# Patient Record
Sex: MALE | Race: WHITE | NOT HISPANIC OR LATINO | Employment: OTHER | URBAN - METROPOLITAN AREA
[De-identification: names, ages, dates, MRNs, and addresses within clinical notes are randomized per-mention and may not be internally consistent; named-entity substitution may affect disease eponyms.]

---

## 2017-03-10 ENCOUNTER — TELEPHONE (OUTPATIENT)
Dept: CARDIOLOGY | Facility: MEDICAL CENTER | Age: 82
End: 2017-03-10

## 2017-03-10 DIAGNOSIS — I10 ESSENTIAL HYPERTENSION: ICD-10-CM

## 2017-03-10 DIAGNOSIS — I48.91 ATRIAL FIBRILLATION, UNSPECIFIED TYPE (HCC): ICD-10-CM

## 2017-03-10 RX ORDER — METOPROLOL SUCCINATE 25 MG/1
25 TABLET, EXTENDED RELEASE ORAL DAILY
Qty: 30 TAB | Refills: 2 | Status: SHIPPED | OUTPATIENT
Start: 2017-03-10 | End: 2017-06-19 | Stop reason: SDUPTHER

## 2017-03-10 RX ORDER — METOPROLOL SUCCINATE 25 MG/1
25 TABLET, EXTENDED RELEASE ORAL DAILY
Qty: 30 TAB | Refills: 6 | OUTPATIENT
Start: 2017-03-10

## 2017-04-28 ENCOUNTER — NON-PROVIDER VISIT (OUTPATIENT)
Dept: CARDIOLOGY | Facility: MEDICAL CENTER | Age: 82
End: 2017-04-28
Payer: COMMERCIAL

## 2017-04-28 ENCOUNTER — OFFICE VISIT (OUTPATIENT)
Dept: CARDIOLOGY | Facility: MEDICAL CENTER | Age: 82
End: 2017-04-28
Payer: COMMERCIAL

## 2017-04-28 VITALS
HEIGHT: 72 IN | OXYGEN SATURATION: 94 % | WEIGHT: 164 LBS | HEART RATE: 63 BPM | SYSTOLIC BLOOD PRESSURE: 122 MMHG | BODY MASS INDEX: 22.21 KG/M2 | DIASTOLIC BLOOD PRESSURE: 62 MMHG

## 2017-04-28 DIAGNOSIS — Z95.0 CARDIAC PACEMAKER IN SITU: ICD-10-CM

## 2017-04-28 DIAGNOSIS — I49.5 SICK SINUS SYNDROME (HCC): ICD-10-CM

## 2017-04-28 DIAGNOSIS — I48.91 ATRIAL FIBRILLATION, UNSPECIFIED TYPE (HCC): ICD-10-CM

## 2017-04-28 LAB — EKG IMPRESSION: NORMAL

## 2017-04-28 PROCEDURE — 93279 PRGRMG DEV EVAL PM/LDLS PM: CPT | Performed by: INTERNAL MEDICINE

## 2017-04-28 PROCEDURE — G8420 CALC BMI NORM PARAMETERS: HCPCS | Performed by: INTERNAL MEDICINE

## 2017-04-28 PROCEDURE — 93000 ELECTROCARDIOGRAM COMPLETE: CPT | Mod: 59 | Performed by: INTERNAL MEDICINE

## 2017-04-28 PROCEDURE — G8432 DEP SCR NOT DOC, RNG: HCPCS | Performed by: INTERNAL MEDICINE

## 2017-04-28 PROCEDURE — 1101F PT FALLS ASSESS-DOCD LE1/YR: CPT | Mod: 8P | Performed by: INTERNAL MEDICINE

## 2017-04-28 PROCEDURE — 4004F PT TOBACCO SCREEN RCVD TLK: CPT | Performed by: INTERNAL MEDICINE

## 2017-04-28 PROCEDURE — 99212 OFFICE O/P EST SF 10 MIN: CPT | Performed by: INTERNAL MEDICINE

## 2017-04-28 PROCEDURE — 4040F PNEUMOC VAC/ADMIN/RCVD: CPT | Mod: 8P | Performed by: INTERNAL MEDICINE

## 2017-04-28 RX ORDER — BETAMETHASONE DIPROPIONATE 0.5 MG/G
1 LOTION TOPICAL
Refills: 0 | COMMUNITY
Start: 2017-03-30 | End: 2019-08-16

## 2017-04-28 ASSESSMENT — ENCOUNTER SYMPTOMS: PALPITATIONS: 0

## 2017-04-28 NOTE — MR AVS SNAPSHOT
"        Monster Miramontes   2017 4:00 PM   Office Visit   MRN: [id number]    Department:  [facility]   Dept Phone:  [phone]    Description:  Male : 1928   Provider:  Junior Shelley M.D.           Reason for Visit     Follow-Up           Allergies as of 2017     Allergen Noted Reactions    Other Drug 2012   Rash    Unknown medication (patch) used to lower blood pressure      You were diagnosed with     Atrial fibrillation, unspecified type (CMS-HCC)   [[id number]]         Vital Signs     Blood Pressure Pulse Height Weight Body Mass Index Oxygen Saturation    122/62 mmHg 63 1.829 m (6' 0.01\") 74.39 kg (164 lb) 22.24 kg/m2 94%    Smoking Status                   Former Smoker           Basic Information     Date Of Birth Sex Race Ethnicity Preferred Language    1928 Male White Non- English      Problem List              ICD-10-CM Priority Class Noted - Resolved    Aortic stenosis I35.0   2012 - Present    CHF (congestive heart failure) (CMS-HCC) I50.9   2012 - Present    A-fib (CMS-HCC) I48.91   2012 - Present    DIABETES MELLITUS    2012 - Present    HTN (hypertension) I10   2012 - Present    TIA (transient ischemic attack) G45.9   2012 - Present    Atrial fibrillation (CMS-HCC) I48.91   2012 - Present    Cardiac pacemaker in situ Z95.0   10/30/2012 - Present    S/P AVR (aortic valve replacement) Z95.2   10/30/2012 - Present      Health Maintenance        Date Due Completion Dates    DIABETES MONOFILAMENT / LE EXAM 1928 ---    RETINAL SCREENING 1946 ---    FASTING LIPID PROFILE 1946 ---    URINE ACR / MICROALBUMIN 1946 ---    IMM DTaP/Tdap/Td Vaccine (1 - Tdap) 1947 ---    COLONOSCOPY 1978 ---    IMM ZOSTER VACCINE 1988 ---    IMM PNEUMOCOCCAL 65+ (ADULT) LOW/MEDIUM RISK SERIES (1 of 2 - PCV13) 1993 ---    A1C SCREENING 3/24/2013 2012    SERUM CREATININE 2013, 2012, " 9/27/2012, 9/25/2012, 9/24/2012, 7/3/2008, 6/27/2008, 3/10/2008, 3/9/2008            Results       Current Immunizations     Influenza TIV (IM) 9/28/2012  5:38 AM      Below and/or attached are the medications your provider expects you to take. Review all of your home medications and newly ordered medications with your provider and/or pharmacist. Follow medication instructions as directed by your provider and/or pharmacist. Please keep your medication list with you and share with your provider. Update the information when medications are discontinued, doses are changed, or new medications (including over-the-counter products) are added; and carry medication information at all times in the event of emergency situations     Allergies:  OTHER DRUG - Rash               Medications  Valid as of: April 28, 2017 -  5:06 PM    Generic Name Brand Name Tablet Size Instructions for use    Betamethasone Dipropionate (Lotion) betamethasone dipropionate 0.05 %         Docusate Sodium (Cap) COLACE 100 MG Take 1 Cap by mouth 2 times a day.        Finasteride (Tab) PROSCAR 5 MG Take 5 mg by mouth every day.        Furosemide (Tab) LASIX 40 MG Take 1 Tab by mouth as needed (daily as needed for leg swelling).        Glimepiride (Tab) AMARYL 2 MG Take 2 mg by mouth every morning.        Levothyroxine Sodium (Tab) SYNTHROID 100 MCG Take 100 mcg by mouth every day.        Lisinopril (Tab) PRINIVIL 5 MG Take 1 Tab by mouth every day.        Metoprolol Succinate (TABLET SR 24 HR) TOPROL XL 25 MG Take 1 Tab by mouth every day.        Spironolactone (Tab) ALDACTONE 25 MG Take 25 mg by mouth every day.          Tamsulosin HCl (Cap) FLOMAX 0.4 MG Take 0.4 mg by mouth ONE-HALF HOUR AFTER BREAKFAST.        Warfarin Sodium (Tab) COUMADIN 5 MG Take 5 mg by mouth every day. Pt states he takes warfarin 5 mg every Tuesday, Thursday, Saturday and Sunday.         .                 Medicines prescribed today were sent to:     [facility] [place] [address]  [address] [address]    Phone: [phone] Fax: [phone]    Open 24 Hours?: No      Medication refill instructions:       If your prescription bottle indicates you have medication refills left, it is not necessary to call your provider’s office. Please contact your pharmacy and they will refill your medication.    If your prescription bottle indicates you do not have any refills left, you may request refills at any time through one of the following ways: The online opentabs system (except Urgent Care), by calling your provider’s office, or by asking your pharmacy to contact your provider’s office with a refill request. Medication refills are processed only during regular business hours and may not be available until the next business day. Your provider may request additional information or to have a follow-up visit with you prior to refilling your medication.   *Please Note: Medication refills are assigned a new Rx number when refilled electronically. Your pharmacy may indicate that no refills were authorized even though a new prescription for the same medication is available at the pharmacy. Please request the medicine by name with the pharmacy before contacting your provider for a refill.           opentabs Access Code: [email or web address]  Expires: 5/28/2017  3:08 PM    opentabs  A secure, online tool to manage your health information     [employer] opentabs® is a secure, online tool that connects you to your personalized health information from the privacy of your home -- day or night - making it very easy for you to manage your healthcare. Once the activation process is completed, you can even access your medical information using the opentabs judith, which is available for free in the Apple Judith store or Google Play store.     opentabs provides the following levels of access (as shown below):   My Chart Features   [facility] Primary Care Doctor  [facility]  Specialists [facility]  Urgent  Care Non-[facility]  Primary Care  Doctor   Email your healthcare team securely and privately 24/7 X X X    Manage appointments: schedule your next appointment; view details of past/upcoming appointments X      Request prescription refills. X      View recent personal medical records, including lab and immunizations X X X X   View health record, including health history, allergies, medications X X X X   Read reports about your outpatient visits, procedures, consult and ER notes X X X X   See your discharge summary, which is a recap of your hospital and/or ER visit that includes your diagnosis, lab results, and care plan. X X       How to register for [employer]:  1. Go to  [email or web address].  2. Click on the Sign Up Now box, which takes you to the New Member Sign Up page. You will need to provide the following information:  a. Enter your [employer] Access Code exactly as it appears at the top of this page. (You will not need to use this code after you’ve completed the sign-up process. If you do not sign up before the expiration date, you must request a new code.)   b. Enter your date of birth.   c. Enter your home email address.   d. Click Submit, and follow the next screen’s instructions.  3. Create a [employer] ID. This will be your [employer] login ID and cannot be changed, so think of one that is secure and easy to remember.  4. Create a [employer] password. You can change your password at any time.  5. Enter your Password Reset Question and Answer. This can be used at a later time if you forget your password.   6. Enter your e-mail address. This allows you to receive e-mail notifications when new information is available in [employer].  7. Click Sign Up. You can now view your health information.    For assistance activating your [employer] account, call [phone]        Quit Tobacco Information     Do you want to quit using tobacco?    Quitting tobacco decreases risks of cancer, heart  and lung disease, increases life expectancy, improves sense of taste and smell, and increases spending money, among other benefits.    If you are thinking about quitting, we can help.  • [facility] Quit Tobacco Program: [phone]  o Program occurs weekly for four weeks and includes pharmacist consultation on products to support quitting smoking or chewing tobacco. A provider referral is needed for pharmacist consultation.  • Tobacco Users Help Hotline: 7-800QUIT-NOW (729-3532) or https://[place].quitlogix.org/  o Free, confidential telephone and online coaching for [place] residents. Sessions are designed on a schedule that is convenient for you. Eligible clients receive free nicotine replacement therapy.  • Nationally: www.smokefree.gov  o Information and professional assistance to support both immediate and long-term needs as you become, and remain, a non-smoker. Smokefree.gov allows you to choose the help that best fits your needs.

## 2017-04-29 NOTE — PROGRESS NOTES
Subjective:   Monster Miramontes is a 89 y.o. male who presents today for follow up of ppm    He has last 10 lb over the last year.    Small memory issues per son.  Did not remember the place he went to lunch today and has been there many times.    Otherwise doing pretty well    Past Medical History   Diagnosis Date   • Unspecified hemorrhagic conditions (CMS-[facility])      takes warfarin   • Cancer (CMS-[facility]) 2007     bladder   • Diabetes      oral medication   • Hypertension    • Congestive heart failure (CMS-HCC)    • Arrhythmia    • Pacemaker    • Atrial fibrillation (CMS-[facility])    • Breath shortness    • Snoring    • Stroke (CMS-[facility]) 2010   • CATARACT      removed   • Heart murmur    • Unspecified disorder of thyroid      hypothyroid     Past Surgical History   Procedure Laterality Date   • Bladder biopsy with cystoscopy  7/3/08     Performed by ANDRESSA HANSON at SURGERY [facility] ORS   • Trans urethral resection bladder  7/3/08     Performed by ANDRESSA HANSON at SURGERY [facility] ORS   • Pacemaker insertion  2008   • Recovery  9/21/2012     Performed by Cath-Recovery Surgery at SURGERY SAME DAY [facility] ORS   • Aortic valve replacement  9/25/2012     Performed by Cong Marinelli M.D. at SURGERY [facility] ORS     History reviewed. No pertinent family history.  History   Smoking status   • Former Smoker -- 2.00 packs/day for 28 years   • Types: Cigarettes   • Quit date: 09/07/1972   Smokeless tobacco   • Current User   • Types: Chew     Allergies   Allergen Reactions   • Other Drug Rash     Unknown medication (patch) used to lower blood pressure     Outpatient Encounter Prescriptions as of 4/28/2017   Medication Sig Dispense Refill   • betamethasone dipropionate 0.05 % lotion   0   • metoprolol SR (TOPROL XL) 25 MG TABLET SR 24 HR Take 1 Tab by mouth every day. 30 Tab 2   • lisinopril (PRINIVIL) 5 MG Tab Take 1 Tab by mouth every day. 30 Tab 5   • finasteride (PROSCAR) 5 MG TABS Take 5 mg by mouth  "every day.     • docusate sodium (COLACE) 100 MG CAPS Take 1 Cap by mouth 2 times a day. 60 Cap 1   • spironolactone (ALDACTONE) 25 MG TABS Take 25 mg by mouth every day.       • glimepiride (AMARYL) 2 MG TABS Take 2 mg by mouth every morning.     • levothyroxine (SYNTHROID) 100 MCG TABS Take 100 mcg by mouth every day.     • tamsulosin (FLOMAX) 0.4 MG capsule Take 0.4 mg by mouth ONE-HALF HOUR AFTER BREAKFAST.     • warfarin (COUMADIN) 5 MG TABS Take 5 mg by mouth every day. Pt states he takes warfarin 5 mg every Tuesday, Thursday, Saturday and Sunday.      • furosemide (LASIX) 40 MG TABS Take 1 Tab by mouth as needed (daily as needed for leg swelling). (Patient taking differently: Take 80 mg by mouth.) 30 Tab 11     No facility-administered encounter medications on file as of 4/28/2017.     Review of Systems   Cardiovascular: Negative for palpitations.        Objective:   /62 mmHg  Pulse 63  Ht 1.829 m (6' 0.01\")  Wt 74.39 kg (164 lb)  BMI 22.24 kg/m2  SpO2 94%    Physical Exam   Cardiovascular:   Well healed ppm pocket       Assessment:     1. Atrial fibrillation, unspecified type (CMS-HCC)  EKG       Medical Decision Making:  Today's Assessment / Status / Plan:     Consider tsh if has not been done with weight loss and memory issues per son  A fib- permanent- on oac  Ppm stable  "

## 2017-06-19 DIAGNOSIS — I48.91 ATRIAL FIBRILLATION, UNSPECIFIED TYPE (HCC): ICD-10-CM

## 2017-06-20 RX ORDER — METOPROLOL SUCCINATE 25 MG/1
25 TABLET, EXTENDED RELEASE ORAL DAILY
Qty: 90 TAB | Refills: 3 | Status: ON HOLD | OUTPATIENT
Start: 2017-06-20 | End: 2017-08-06

## 2017-07-27 ENCOUNTER — APPOINTMENT (OUTPATIENT)
Dept: RADIOLOGY | Facility: MEDICAL CENTER | Age: 82
DRG: 245 | End: 2017-07-27
Attending: EMERGENCY MEDICINE
Payer: COMMERCIAL

## 2017-07-27 ENCOUNTER — HOSPITAL ENCOUNTER (OUTPATIENT)
Dept: RADIOLOGY | Facility: MEDICAL CENTER | Age: 82
End: 2017-07-27

## 2017-07-27 ENCOUNTER — APPOINTMENT (OUTPATIENT)
Dept: RADIOLOGY | Facility: MEDICAL CENTER | Age: 82
DRG: 245 | End: 2017-07-27
Attending: HOSPITALIST
Payer: COMMERCIAL

## 2017-07-27 ENCOUNTER — RESOLUTE PROFESSIONAL BILLING HOSPITAL PROF FEE (OUTPATIENT)
Dept: HOSPITALIST | Facility: MEDICAL CENTER | Age: 82
End: 2017-07-27
Payer: COMMERCIAL

## 2017-07-27 ENCOUNTER — HOSPITAL ENCOUNTER (INPATIENT)
Facility: MEDICAL CENTER | Age: 82
LOS: 1 days | DRG: 245 | End: 2017-07-29
Attending: EMERGENCY MEDICINE | Admitting: HOSPITALIST
Payer: COMMERCIAL

## 2017-07-27 DIAGNOSIS — I48.0 PAROXYSMAL ATRIAL FIBRILLATION (HCC): ICD-10-CM

## 2017-07-27 DIAGNOSIS — R55 NEAR SYNCOPE: ICD-10-CM

## 2017-07-27 DIAGNOSIS — I50.22 CHRONIC SYSTOLIC CONGESTIVE HEART FAILURE (HCC): ICD-10-CM

## 2017-07-27 DIAGNOSIS — R00.1 BRADYCARDIA: ICD-10-CM

## 2017-07-27 DIAGNOSIS — I95.9 HYPOTENSION, UNSPECIFIED HYPOTENSION TYPE: ICD-10-CM

## 2017-07-27 PROBLEM — E86.0 DEHYDRATION: Status: ACTIVE | Noted: 2017-07-27

## 2017-07-27 PROBLEM — D68.318 ACQUIRED CIRCULATING ANTICOAGULANTS (HCC): Status: ACTIVE | Noted: 2017-07-27

## 2017-07-27 LAB
ALBUMIN SERPL BCP-MCNC: 3.7 G/DL (ref 3.2–4.9)
ALBUMIN/GLOB SERPL: 1.2 G/DL
ALP SERPL-CCNC: 98 U/L (ref 30–99)
ALT SERPL-CCNC: 10 U/L (ref 2–50)
ANION GAP SERPL CALC-SCNC: 12 MMOL/L (ref 0–11.9)
APTT PPP: 37.3 SEC (ref 24.7–36)
AST SERPL-CCNC: 20 U/L (ref 12–45)
BASOPHILS # BLD AUTO: 0.4 % (ref 0–1.8)
BASOPHILS # BLD: 0.03 K/UL (ref 0–0.12)
BILIRUB SERPL-MCNC: 1.2 MG/DL (ref 0.1–1.5)
BNP SERPL-MCNC: 334 PG/ML (ref 0–100)
BUN SERPL-MCNC: 34 MG/DL (ref 8–22)
CALCIUM SERPL-MCNC: 9.2 MG/DL (ref 8.5–10.5)
CHLORIDE SERPL-SCNC: 94 MMOL/L (ref 96–112)
CO2 SERPL-SCNC: 23 MMOL/L (ref 20–33)
CREAT SERPL-MCNC: 1.24 MG/DL (ref 0.5–1.4)
EKG IMPRESSION: NORMAL
EOSINOPHIL # BLD AUTO: 0.03 K/UL (ref 0–0.51)
EOSINOPHIL NFR BLD: 0.4 % (ref 0–6.9)
ERYTHROCYTE [DISTWIDTH] IN BLOOD BY AUTOMATED COUNT: 43.7 FL (ref 35.9–50)
GFR SERPL CREATININE-BSD FRML MDRD: 55 ML/MIN/1.73 M 2
GLOBULIN SER CALC-MCNC: 3.2 G/DL (ref 1.9–3.5)
GLUCOSE SERPL-MCNC: 152 MG/DL (ref 65–99)
HCT VFR BLD AUTO: 39.2 % (ref 42–52)
HGB BLD-MCNC: 13.8 G/DL (ref 14–18)
IMM GRANULOCYTES # BLD AUTO: 0.04 K/UL (ref 0–0.11)
IMM GRANULOCYTES NFR BLD AUTO: 0.5 % (ref 0–0.9)
INR PPP: 2.42 (ref 0.87–1.13)
LIPASE SERPL-CCNC: 10 U/L (ref 11–82)
LYMPHOCYTES # BLD AUTO: 0.54 K/UL (ref 1–4.8)
LYMPHOCYTES NFR BLD: 7.4 % (ref 22–41)
MCH RBC QN AUTO: 33.7 PG (ref 27–33)
MCHC RBC AUTO-ENTMCNC: 35.2 G/DL (ref 33.7–35.3)
MCV RBC AUTO: 95.8 FL (ref 81.4–97.8)
MONOCYTES # BLD AUTO: 0.77 K/UL (ref 0–0.85)
MONOCYTES NFR BLD AUTO: 10.5 % (ref 0–13.4)
NEUTROPHILS # BLD AUTO: 5.92 K/UL (ref 1.82–7.42)
NEUTROPHILS NFR BLD: 80.8 % (ref 44–72)
NRBC # BLD AUTO: 0 K/UL
NRBC BLD AUTO-RTO: 0 /100 WBC
PLATELET # BLD AUTO: 225 K/UL (ref 164–446)
PMV BLD AUTO: 9.1 FL (ref 9–12.9)
POTASSIUM SERPL-SCNC: 4.3 MMOL/L (ref 3.6–5.5)
PROT SERPL-MCNC: 6.9 G/DL (ref 6–8.2)
PROTHROMBIN TIME: 27.1 SEC (ref 12–14.6)
RBC # BLD AUTO: 4.09 M/UL (ref 4.7–6.1)
SODIUM SERPL-SCNC: 129 MMOL/L (ref 135–145)
TROPONIN I SERPL-MCNC: 0.03 NG/ML (ref 0–0.04)
TROPONIN I SERPL-MCNC: 0.04 NG/ML (ref 0–0.04)
TSH SERPL DL<=0.005 MIU/L-ACNC: 0.52 UIU/ML (ref 0.3–3.7)
WBC # BLD AUTO: 7.3 K/UL (ref 4.8–10.8)

## 2017-07-27 PROCEDURE — 84443 ASSAY THYROID STIM HORMONE: CPT

## 2017-07-27 PROCEDURE — 304561 HCHG STAT O2

## 2017-07-27 PROCEDURE — 80053 COMPREHEN METABOLIC PANEL: CPT

## 2017-07-27 PROCEDURE — 36415 COLL VENOUS BLD VENIPUNCTURE: CPT

## 2017-07-27 PROCEDURE — 93005 ELECTROCARDIOGRAM TRACING: CPT

## 2017-07-27 PROCEDURE — 99220 PR INITIAL OBSERVATION CARE,LEVL III: CPT | Performed by: HOSPITALIST

## 2017-07-27 PROCEDURE — 70450 CT HEAD/BRAIN W/O DYE: CPT

## 2017-07-27 PROCEDURE — G0378 HOSPITAL OBSERVATION PER HR: HCPCS

## 2017-07-27 PROCEDURE — 71010 DX-CHEST-PORTABLE (1 VIEW): CPT

## 2017-07-27 PROCEDURE — 83880 ASSAY OF NATRIURETIC PEPTIDE: CPT

## 2017-07-27 PROCEDURE — 85610 PROTHROMBIN TIME: CPT

## 2017-07-27 PROCEDURE — 99285 EMERGENCY DEPT VISIT HI MDM: CPT

## 2017-07-27 PROCEDURE — 700105 HCHG RX REV CODE 258: Performed by: HOSPITALIST

## 2017-07-27 PROCEDURE — 84484 ASSAY OF TROPONIN QUANT: CPT | Mod: 91

## 2017-07-27 PROCEDURE — 85730 THROMBOPLASTIN TIME PARTIAL: CPT

## 2017-07-27 PROCEDURE — 83690 ASSAY OF LIPASE: CPT

## 2017-07-27 PROCEDURE — 93005 ELECTROCARDIOGRAM TRACING: CPT | Performed by: EMERGENCY MEDICINE

## 2017-07-27 PROCEDURE — 304562 HCHG STAT O2 MASK/CANNULA

## 2017-07-27 PROCEDURE — 85025 COMPLETE CBC W/AUTO DIFF WBC: CPT

## 2017-07-27 RX ORDER — TAMSULOSIN HYDROCHLORIDE 0.4 MG/1
0.4 CAPSULE ORAL
Status: DISCONTINUED | OUTPATIENT
Start: 2017-07-28 | End: 2017-07-29

## 2017-07-27 RX ORDER — GLIMEPIRIDE 2 MG/1
2 TABLET ORAL EVERY MORNING
Status: DISCONTINUED | OUTPATIENT
Start: 2017-07-28 | End: 2017-07-29 | Stop reason: HOSPADM

## 2017-07-27 RX ORDER — FINASTERIDE 5 MG/1
5 TABLET, FILM COATED ORAL DAILY
Status: DISCONTINUED | OUTPATIENT
Start: 2017-07-28 | End: 2017-07-29 | Stop reason: HOSPADM

## 2017-07-27 RX ORDER — AMOXICILLIN 250 MG
2 CAPSULE ORAL 2 TIMES DAILY
Status: DISCONTINUED | OUTPATIENT
Start: 2017-07-27 | End: 2017-07-29 | Stop reason: HOSPADM

## 2017-07-27 RX ORDER — WARFARIN SODIUM 5 MG/1
5 TABLET ORAL
Status: DISCONTINUED | OUTPATIENT
Start: 2017-07-29 | End: 2017-07-29 | Stop reason: HOSPADM

## 2017-07-27 RX ORDER — BISACODYL 10 MG
10 SUPPOSITORY, RECTAL RECTAL
Status: DISCONTINUED | OUTPATIENT
Start: 2017-07-27 | End: 2017-07-29 | Stop reason: HOSPADM

## 2017-07-27 RX ORDER — SODIUM CHLORIDE 9 MG/ML
INJECTION, SOLUTION INTRAVENOUS CONTINUOUS
Status: DISCONTINUED | OUTPATIENT
Start: 2017-07-27 | End: 2017-07-29 | Stop reason: HOSPADM

## 2017-07-27 RX ORDER — POLYETHYLENE GLYCOL 3350 17 G/17G
1 POWDER, FOR SOLUTION ORAL
Status: DISCONTINUED | OUTPATIENT
Start: 2017-07-27 | End: 2017-07-29 | Stop reason: HOSPADM

## 2017-07-27 RX ORDER — METOPROLOL SUCCINATE 25 MG/1
25 TABLET, EXTENDED RELEASE ORAL DAILY
Status: DISCONTINUED | OUTPATIENT
Start: 2017-07-28 | End: 2017-07-28

## 2017-07-27 RX ORDER — LISINOPRIL 5 MG/1
5 TABLET ORAL DAILY
Status: DISCONTINUED | OUTPATIENT
Start: 2017-07-28 | End: 2017-07-28

## 2017-07-27 RX ORDER — WARFARIN SODIUM 7.5 MG/1
7.5 TABLET ORAL
Status: DISCONTINUED | OUTPATIENT
Start: 2017-07-28 | End: 2017-07-29 | Stop reason: HOSPADM

## 2017-07-27 RX ORDER — LEVOTHYROXINE SODIUM 0.1 MG/1
100 TABLET ORAL DAILY
Status: DISCONTINUED | OUTPATIENT
Start: 2017-07-28 | End: 2017-07-29 | Stop reason: HOSPADM

## 2017-07-27 RX ORDER — ONDANSETRON 2 MG/ML
4 INJECTION INTRAMUSCULAR; INTRAVENOUS EVERY 4 HOURS PRN
Status: DISCONTINUED | OUTPATIENT
Start: 2017-07-27 | End: 2017-07-29 | Stop reason: HOSPADM

## 2017-07-27 RX ORDER — ONDANSETRON 4 MG/1
4 TABLET, ORALLY DISINTEGRATING ORAL EVERY 4 HOURS PRN
Status: DISCONTINUED | OUTPATIENT
Start: 2017-07-27 | End: 2017-07-29 | Stop reason: HOSPADM

## 2017-07-27 RX ORDER — SPIRONOLACTONE 25 MG/1
25 TABLET ORAL DAILY
Status: DISCONTINUED | OUTPATIENT
Start: 2017-07-28 | End: 2017-07-28

## 2017-07-27 RX ORDER — DOCUSATE SODIUM 100 MG/1
100 CAPSULE, LIQUID FILLED ORAL 2 TIMES DAILY
Status: DISCONTINUED | OUTPATIENT
Start: 2017-07-27 | End: 2017-07-27

## 2017-07-27 RX ORDER — FUROSEMIDE 40 MG/1
80 TABLET ORAL DAILY
Status: ON HOLD | COMMUNITY
End: 2017-08-06

## 2017-07-27 RX ADMIN — SODIUM CHLORIDE: 9 INJECTION, SOLUTION INTRAVENOUS at 22:25

## 2017-07-27 ASSESSMENT — ENCOUNTER SYMPTOMS
LOSS OF CONSCIOUSNESS: 1
CHILLS: 0
VOMITING: 0
NAUSEA: 0
WHEEZING: 0
SHORTNESS OF BREATH: 0
HEADACHES: 0
DIARRHEA: 0
COUGH: 0
CONSTIPATION: 0
FEVER: 0

## 2017-07-27 ASSESSMENT — COGNITIVE AND FUNCTIONAL STATUS - GENERAL
MOBILITY SCORE: 21
EATING MEALS: A LITTLE
SUGGESTED CMS G CODE MODIFIER DAILY ACTIVITY: CJ
DAILY ACTIVITIY SCORE: 22
STANDING UP FROM CHAIR USING ARMS: A LITTLE
CLIMB 3 TO 5 STEPS WITH RAILING: A LITTLE
SUGGESTED CMS G CODE MODIFIER MOBILITY: CJ
WALKING IN HOSPITAL ROOM: A LITTLE
TOILETING: A LITTLE

## 2017-07-27 ASSESSMENT — COPD QUESTIONNAIRES
DURING THE PAST 4 WEEKS HOW MUCH DID YOU FEEL SHORT OF BREATH: MOST  OR ALL OF THE TIME
COPD SCREENING SCORE: 7
DO YOU EVER COUGH UP ANY MUCUS OR PHLEGM?: NO/ONLY WITH OCCASIONAL COLDS OR INFECTIONS
HAVE YOU SMOKED AT LEAST 100 CIGARETTES IN YOUR ENTIRE LIFE: YES

## 2017-07-27 ASSESSMENT — PAIN SCALES - GENERAL
PAINLEVEL_OUTOF10: 0
PAINLEVEL_OUTOF10: 0

## 2017-07-27 ASSESSMENT — PATIENT HEALTH QUESTIONNAIRE - PHQ9
1. LITTLE INTEREST OR PLEASURE IN DOING THINGS: NOT AT ALL
SUM OF ALL RESPONSES TO PHQ9 QUESTIONS 1 AND 2: 0
2. FEELING DOWN, DEPRESSED, IRRITABLE, OR HOPELESS: NOT AT ALL
SUM OF ALL RESPONSES TO PHQ QUESTIONS 1-9: 0

## 2017-07-27 ASSESSMENT — LIFESTYLE VARIABLES
EVER_SMOKED: NEVER
DO YOU DRINK ALCOHOL: NO
EVER_SMOKED: YES

## 2017-07-27 ASSESSMENT — CHA2DS2 SCORE
CHF OR LEFT VENTRICULAR DYSFUNCTION: YES
VASCULAR DISEASE: NO
PRIOR STROKE OR TIA OR THROMBOEMBOLISM: YES
SEX: MALE
CHA2DS2 VASC SCORE: 7
AGE 75 OR GREATER: YES
HYPERTENSION: YES
DIABETES: YES
AGE 65 TO 74: NO

## 2017-07-27 NOTE — IP AVS SNAPSHOT
" <p align=\"LEFT\"><IMG SRC=\"//[email or web address]$/[email or web address]\" alt=\"Image\" WIDTH=\"50%\" HEIGHT=\"200\" BORDER=\"\"></p>                   Name:Monster Miramontes  Medical Record Number:[id number]  CSN: [id number]    YOB: 1928   Age: 89 y.o.  Sex: male  HT:1.88 m (6' 2\") WT: 74.3 kg (163 lb 12.8 oz)          Admit Date: 7/27/2017     Discharge Date:   Today's Date: 7/29/2017  Attending Doctor:  Monster East M.D.                  Allergies:  Clonidine          Your appointments     Aug 04, 2017  3:15 PM   PACER CHECK ONLY with PACER CHECK-[facility] B 2   [facility] (--)    [address]  [place] [address]   [phone]            Aug 09, 2017  8:40 AM   FOLLOW UP with Junior Shelley M.D.   [facility] (--)    [address]  [place] [address]   [phone]              Follow-up Information     1. Follow up with Jose Hayes M.D..    Specialty:  Family Medicine    Why:  As needed    Contact information    [address] [place] [address] [phone]           Medication List      Take these Medications        Instructions    betamethasone dipropionate 0.05 % lotion    Apply 1 Application to affected area(s) every bedtime.   Dose:  1 Application       finasteride 5 MG Tabs   Commonly known as:  PROSCAR    Take 5 mg by mouth every day.   Dose:  5 mg       furosemide 40 MG Tabs   Commonly known as:  LASIX    Take 80 mg by mouth every day.   Dose:  80 mg       glimepiride 2 MG Tabs   Commonly known as:  AMARYL    Take 2 mg by mouth every morning.   Dose:  2 mg       levothyroxine 100 MCG Tabs   Commonly known as:  SYNTHROID    Take 100 mcg by mouth every day.   Dose:  100 mcg       lisinopril 5 MG Tabs   Commonly known as:  PRINIVIL    Doctor's comments:  This is a refill in response to request sent in on 5/7/16   Take 1 Tab by mouth every day.   Dose:  5 mg       metoprolol SR 25 MG Tb24   Commonly known as:  " TOPROL XL    Take 1 Tab by mouth every day.   Dose:  25 mg       spironolactone 25 MG Tabs   Commonly known as:  ALDACTONE    Take 25 mg by mouth every day.   Dose:  25 mg       tamsulosin 0.4 MG capsule   Commonly known as:  FLOMAX    Take 0.4 mg by mouth ONE-HALF HOUR AFTER BREAKFAST.   Dose:  0.4 mg       warfarin 5 MG Tabs   Commonly known as:  COUMADIN    Take 5-7.5 mg by mouth every day. 7.5mg on Monday/Wednesday/Friday, 5mg on all other days   Dose:  5-7.5 mg

## 2017-07-27 NOTE — CONSULTS
Cardiology Consult Note:    Nataly Larkin  Date & Time note created:    7/27/2017   4:33 PM     Referring MD:  Dr. Romaine Brandon    Patient ID:   Name:             Monster Miramontes   YOB: 1928  Age:                 89 y.o.  male   MRN:               [id number]                                                             Reason for Consult:      Syncope    History of Present Illness:    Patient is a pleasant 89-year-old male with known history of severe aortic stenosis status post aVR in 2012, a nonischemic cardiomyopathy LVEF of 25-30%, nonocclusive CAD angiogram from 9/12 showed left main 30-40% narrowing, LAD luminal irregularities no more than 30%, LCx luminary irregularities about 30-40% narrowing, eccentric proximal RCA lesion about 60-70% another long 60% lesion, persistent atrial fibrillation on Coumadin for anticoagulation, diabetes, hypertension, sinus syndrome status post an Steven pacemaker transferred from [facility] for bradycardia and syncope.    Based on the records patient had a syncopal episode while having lunch. Patient was found to have blood pressure of 80/40 with heart rate of 40s initially and according to EMS pacemaker was fighting intermittently. While he was in the ER his blood pressure was 130/80 due to his heart rates being in 40 bpm received atropine 0.5 mg which increased his heart rate to 60 BPM. Patient is very hard of hearing doesn't remember what exactly happened. As of now denied any complaints of dizziness lightheadedness. No complaints of chest pain pressure. Complaining of dyspnea with minimal activities. Difficult to get much information from patient due to hard of hearing.    Review of Systems:      Constitutional: Denies fevers, Denies weight changes  Eyes: Denies changes in vision, no eye pain  Ears/Nose/Throat/Mouth: +ve hearing loss  Cardiovascular: -ve chest pain, -ve palpitations   Respiratory: +ve shortness of breath , Denies  cough  Gastrointestinal/Hepatic: Denies abdominal pain, nausea, vomiting, diarrhea, constipation or GI bleeding   Genitourinary: Denies dysuria or frequency  Musculoskeletal/Rheum: +ve joint pain and swelling, -ve edema  Skin: Denies rash  Neurological: Denies headache, confusion, memory loss or focal weakness/parasthesias  Psychiatric: denies mood disorder   Endocrine: +ve thyroid problems  Heme/Oncology/Lymph Nodes: Denies enlarged lymph nodes, denies brusing or known bleeding disorder  All other systems were reviewed and are negative (AMA/CMS criteria)                Past Medical History:   Past Medical History   Diagnosis Date   • Unspecified hemorrhagic conditions      takes warfarin   • Cancer (CMS-[facility]) 2007     bladder   • Diabetes      oral medication   • Hypertension    • Congestive heart failure (CMS-[facility])    • Arrhythmia    • Pacemaker    • Atrial fibrillation (CMS-[facility])    • Breath shortness    • Snoring    • Stroke (CMS-[facility]) 2010   • CATARACT      removed   • Heart murmur    • Unspecified disorder of thyroid      hypothyroid     There are no hospital problems to display for this patient.      Past Surgical History:  Past Surgical History   Procedure Laterality Date   • Bladder biopsy with cystoscopy  7/3/08     Performed by ANDRESSA HANSON at SURGERY [facility]   • Trans urethral resection bladder  7/3/08     Performed by ANDRESSA HANSON at SURGERY [facility]   • Pacemaker insertion  2008   • Recovery  9/21/2012     Performed by Cath-Recovery Surgery at SURGERY SAME DAY [facility]   • Aortic valve replacement  9/25/2012     Performed by Cong Marinelli M.D. at [facility]       Hospital Medications:  No current facility-administered medications for this encounter.    Current outpatient prescriptions:   •  metoprolol SR (TOPROL XL) 25 MG TABLET SR 24 HR, Take 1 Tab by mouth every day., Disp: 90 Tab, Rfl: 3  •  betamethasone dipropionate 0.05 % lotion, , Disp: , Rfl: 0  •   lisinopril (PRINIVIL) 5 MG Tab, Take 1 Tab by mouth every day., Disp: 30 Tab, Rfl: 5  •  finasteride (PROSCAR) 5 MG TABS, Take 5 mg by mouth every day., Disp: , Rfl:   •  furosemide (LASIX) 40 MG TABS, Take 1 Tab by mouth as needed (daily as needed for leg swelling). (Patient taking differently: Take 80 mg by mouth.), Disp: 30 Tab, Rfl: 11  •  docusate sodium (COLACE) 100 MG CAPS, Take 1 Cap by mouth 2 times a day., Disp: 60 Cap, Rfl: 1  •  spironolactone (ALDACTONE) 25 MG TABS, Take 25 mg by mouth every day.  , Disp: , Rfl:   •  glimepiride (AMARYL) 2 MG TABS, Take 2 mg by mouth every morning., Disp: , Rfl:   •  levothyroxine (SYNTHROID) 100 MCG TABS, Take 100 mcg by mouth every day., Disp: , Rfl:   •  tamsulosin (FLOMAX) 0.4 MG capsule, Take 0.4 mg by mouth ONE-HALF HOUR AFTER BREAKFAST., Disp: , Rfl:   •  warfarin (COUMADIN) 5 MG TABS, Take 5 mg by mouth every day. Pt states he takes warfarin 5 mg every Tuesday, Thursday, Saturday and Sunday. , Disp: , Rfl:     Current Outpatient Medications:    (Not in a hospital admission)    Medication Allergy:  Allergies   Allergen Reactions   • Clonidine Rash     ,   • Other Drug Rash     Unknown medication (patch) used to lower blood pressure       Family History:  History reviewed. No pertinent family history.    Social History:  Social History     Social History   • Marital Status:      Spouse Name: N/A   • Number of Children: N/A   • Years of Education: N/A     Occupational History   • Not on file.     Social History Main Topics   • Smoking status: Former Smoker -- 2.00 packs/day for 28 years     Types: Cigarettes     Quit date: 09/07/1972   • Smokeless tobacco: Current User     Types: Chew   • Alcohol Use: Yes      Comment: 3 drinks per day   • Drug Use: No   • Sexual Activity: Not on file     Other Topics Concern   • Not on file     Social History Narrative         Physical Exam:  Vitals/ General Appearance:   Weight/BMI: Body mass index is 23.1 kg/(m^2).  Blood  "pressure 143/63, pulse 68, temperature 36.7 °C (98 °F), resp. rate 16, height 1.88 m (6' 2\"), weight 81.647 kg (180 lb), SpO2 98 %.  Filed Vitals:    17 1453 17 1454 17 1456   BP:   143/63   Pulse:   68   Temp:   36.7 °C (98 °F)   Resp:   16   Height: 1.88 m (6' 2\")     Weight: 81.647 kg (180 lb) 81.647 kg (180 lb)    SpO2:   98%     Oxygen Therapy:  Pulse Oximetry: 98 %, O2 (LPM): 2    Constitutional: Elderly male resting comfortably in bed in no acute distress  HENMT:  Normocephalic, Atraumatic, Oropharynx moist mucous membranes, No oral exudates, Nose normal.  No thyromegaly.  Eyes:  EOMI, Conjunctiva normal, No discharge.  Neck:  Normal range of motion, No cervical tenderness,  no JVD.  Cardiovascular:  Irregularly irregular rhythm normal rate soft systolic murmur best heard in the tricuspid area.  Dorsalis pedis pulses 1+   No edema.  Lungs:  Normal breath sounds, breath sounds clear to auscultation bilaterally,  no crackles, no wheezing.   Abdomen: Bowel sounds normal, Soft, No tenderness, No guarding, No rebound, No masses, No hepatosplenomegaly.  Skin: Warm, Dry, No erythema, No rash, no induration.  Neurologic: Alert, moving all 4 extremities,   Psychiatric: Affect normal, Judgment normal, Mood normal.      MDM (Data Review):     Records reviewed and summarized in current documentation    Lab Data Review:  Recent Results (from the past 24 hour(s))   EKG (NOW)    Collection Time: 17  2:51 PM   Result Value Ref Range    Report       [facility] Emergency Dept.    Test Date:  2017  Pt Name:    GEORGI SPENCER              Department: ER  MRN:        [id number]                      Room:       BL 20  Gender:     M                            Technician: [id number]  :        [id number]                   Requested By:ER TRIAGE PROTOCOL  Order #:    [id number]                    Reading MD: DAVIS GARDNER MD    Measurements  Intervals                                " Axis  Rate:       55                           P:  ME:                                      QRS:        -66  QRSD:       158                          T:          103  QT:         488  QTc:        467    Interpretive Statements  ATRIAL FIBRILLATION, V-RATE  48- 67  NONSPECIFIC IVCD WITH LAD  Compared to ECG 04/28/2017 16:05:12  Intraventricular conduction delay now present  Ventricular-paced complex(es) or rhythm no longer present    Electronically Signed On 7- 15:52:29 PDT by DAVIS GARDNER MD     CBC with Differential    Collection Time: 07/27/17  3:13 PM   Result Value Ref Range    WBC 7.3 4.8 - 10.8 K/uL    RBC 4.09 (L) 4.70 - 6.10 M/uL    Hemoglobin 13.8 (L) 14.0 - 18.0 g/dL    Hematocrit 39.2 (L) 42.0 - 52.0 %    MCV 95.8 81.4 - 97.8 fL    MCH 33.7 (H) 27.0 - 33.0 pg    MCHC 35.2 33.7 - 35.3 g/dL    RDW 43.7 35.9 - 50.0 fL    Platelet Count 225 164 - 446 K/uL    MPV 9.1 9.0 - 12.9 fL    Neutrophils-Polys 80.80 (H) 44.00 - 72.00 %    Lymphocytes 7.40 (L) 22.00 - 41.00 %    Monocytes 10.50 0.00 - 13.40 %    Eosinophils 0.40 0.00 - 6.90 %    Basophils 0.40 0.00 - 1.80 %    Immature Granulocytes 0.50 0.00 - 0.90 %    Nucleated RBC 0.00 /100 WBC    Neutrophils (Absolute) 5.92 1.82 - 7.42 K/uL    Lymphs (Absolute) 0.54 (L) 1.00 - 4.80 K/uL    Monos (Absolute) 0.77 0.00 - 0.85 K/uL    Eos (Absolute) 0.03 0.00 - 0.51 K/uL    Baso (Absolute) 0.03 0.00 - 0.12 K/uL    Immature Granulocytes (abs) 0.04 0.00 - 0.11 K/uL    NRBC (Absolute) 0.00 K/uL   Complete Metabolic Panel (CMP)    Collection Time: 07/27/17  3:13 PM   Result Value Ref Range    Sodium 129 (L) 135 - 145 mmol/L    Potassium 4.3 3.6 - 5.5 mmol/L    Chloride 94 (L) 96 - 112 mmol/L    Co2 23 20 - 33 mmol/L    Anion Gap 12.0 (H) 0.0 - 11.9    Glucose 152 (H) 65 - 99 mg/dL    Bun 34 (H) 8 - 22 mg/dL    Creatinine 1.24 0.50 - 1.40 mg/dL    Calcium 9.2 8.5 - 10.5 mg/dL    AST(SGOT) 20 12 - 45 U/L    ALT(SGPT) 10 2 - 50 U/L    Alkaline Phosphatase 98 30 -  99 U/L    Total Bilirubin 1.2 0.1 - 1.5 mg/dL    Albumin 3.7 3.2 - 4.9 g/dL    Total Protein 6.9 6.0 - 8.2 g/dL    Globulin 3.2 1.9 - 3.5 g/dL    A-G Ratio 1.2 g/dL   Prothrombin Time (PT/INR)    Collection Time: 17  3:13 PM   Result Value Ref Range    PT 27.1 (H) 12.0 - 14.6 sec    INR 2.42 (H) 0.87 - 1.13   APTT    Collection Time: 17  3:13 PM   Result Value Ref Range    APTT 37.3 (H) 24.7 - 36.0 sec   Lipase    Collection Time: 17  3:13 PM   Result Value Ref Range    Lipase 10 (L) 11 - 82 U/L   Troponin STAT    Collection Time: 17  3:13 PM   Result Value Ref Range    Troponin I 0.03 0.00 - 0.04 ng/mL   ESTIMATED GFR    Collection Time: 17  3:13 PM   Result Value Ref Range    GFR If African American >60 >60 mL/min/1.73 m 2    GFR If Non African American 55 (A) >60 mL/min/1.73 m 2     EK17  EKG personally reviewed by me  Atrial fibrillation left axis IVCD versus atypical left bundle branch block and diffuse nonspecific T-wave changes.    Chest Xray: 17  There is no evidence of focal consolidation or evidence of pulmonary edema.  There is no evidence of pleural effusion.  Mild cardiomegaly.  Sternotomy wires. Prosthetic valve.  Stable position left transvenous electrodes    Aortic valve replacement #[id number]    SCOTT: 12  Intra-operative transesophageal echocardiogram performed by anesthesiology.  Pre-op SCOTT shows significant aortic stenosis, mild to moderate eccentric mitral regurgitation, left atrial enlargement, tiny patent foramen ovale with left to right shunting, and depressed LV systolic function.  Post-op SCOTT shows dyskinetic apex, severely depressed LV systolic function, normally functioning bioprosthetic AVR, mild mitral regurgitation, and tiny PFO with left to right shunting    TTE: 12  Left ventricle is mildly dilated. Severely reduced left ventricular systolic function. Left ventricular ejection fraction is 25% to 30%.  Enlarged right ventricular  size.  Moderately dilated right atrium.  Severely dilated left atrium. LA Volume index 63 ml/m2.  Thickened mitral valve leaflets. Moderate mitral regurgitation.  Severe aortic stenosis. Trace aortic insufficiency. Peak 63 mmHg, mean 41 mmHg, DI= .16 DMITRIY .7cm2.   Moderate tricuspid regurgitation. Right ventricular systolic pressure is estimated to be 50-55 mmHg consistent with moderate pulmonary hypertension.    Angiogram: 9/21/12  1.  Hemodynamics:  Systolic blood pressure at the beginning of the procedure  was about 92.  2.  Coronary angiography:  a.  Ostial:  30-40% narrowing.  b.  LAD:  Mild luminal irregularities, none greater than 30%.  c.  Left circumflex nondominant, moderate in size with mild luminal  irregularities, none greater than 30-40%.  d.  RCA is moderate to large in size, dominant, and has diffuse mild to moderate luminal irregularities with one eccentric proximal to mid discrete lesion of 60-70%, another longer 60% lesion.  Of note, all the coronary vessels have a great deal of calcification.    MDM (Assessment and Plan):     There are no hospital problems to display for this patient.      Patient is a pleasant 89-year-old male with known history of severe aortic stenosis status post aVR in 2012, a nonischemic cardiomyopathy LVEF of 25-30%, nonocclusive CAD angiogram from 9/12 showed left main 30-40% narrowing, LAD luminal irregularities no more than 30%, LCx luminary irregularities about 30-40% narrowing, eccentric proximal RCA lesion about 60-70% another long 60% lesion, persistent atrial fibrillation on Coumadin for anticoagulation, diabetes, hypertension, sinus syndrome status post an Steven pacemaker transferred from [facility] for bradycardia and syncope.    Syncope:  Unfortunately unable to get much history from patient.  Pacemaker malfunctioning ?  Will interrogate send Steven pacemaker today.   On exam patient appears dehydrated though orthostatics are negative  Hold Lasix start IV fluids  at 50 cc an hour.  Carotid artery doppler     Status post aVR:  Echo to assess gradients across the aortic valve.    Cardiomyopathy LVEF of 25-30% based on echocardiogram done in 2012:  Echo to assess his underlying LV function.  If pacemaker function is normal restart Toprol-XL 25 mg daily.  Continue spironolactone 25 mg daily.  Continue lisinopril 5 mg daily.    Atrial fibrillation:  Rate controlled on Toprol-XL.  Continue Coumadin with target INR of 2-3.    Thank you for allowing me to participate in taking care of patient.    Nataly Goldberg MD.

## 2017-07-27 NOTE — ED NOTES
Pt. Presents to ED with Syncope... Pt. Coming from [employer]... Pt. Arrived by med flight. PT. aao x3 and in no acute distress at this time. Pt. Received 0.5mg of Atropine prior to arrival for HR in 30s. Pt. Hard of hearing... Respirations even and unlabored.

## 2017-07-27 NOTE — IP AVS SNAPSHOT
[employer] Access Code: [email or web address]  Expires: 8/28/2017 11:39 AM    [employer]  A secure, online tool to manage your health information     [employer]’s [employer]® is a secure, online tool that connects you to your personalized health information from the privacy of your home -- day or night - making it very easy for you to manage your healthcare. Once the activation process is completed, you can even access your medical information using the [employer] judith, which is available for free in the Apple Judith store or Google Play store.     [employer] provides the following levels of access (as shown below):   My Chart Features   [facility] Primary Care Doctor [facility]  Specialists [facility]  Urgent  Care Non-[facility]  Primary Care  Doctor   Email your healthcare team securely and privately 24/7 X X X X   Manage appointments: schedule your next appointment; view details of past/upcoming appointments X      Request prescription refills. X      View recent personal medical records, including lab and immunizations X X X X   View health record, including health history, allergies, medications X X X X   Read reports about your outpatient visits, procedures, consult and ER notes X X X X   See your discharge summary, which is a recap of your hospital and/or ER visit that includes your diagnosis, lab results, and care plan. X X       How to register for [employer]:  1. Go to  [email or web address].  2. Click on the Sign Up Now box, which takes you to the New Member Sign Up page. You will need to provide the following information:  a. Enter your [employer] Access Code exactly as it appears at the top of this page. (You will not need to use this code after you’ve completed the sign-up process. If you do not sign up before the expiration date, you must request a new code.)   b. Enter your date of birth.   c. Enter your home email address.   d. Click Submit, and follow the next screen’s instructions.  3. Create a [employer] ID. This will be your [employer]  login ID and cannot be changed, so think of one that is secure and easy to remember.  4. Create a [employer] password. You can change your password at any time.  5. Enter your Password Reset Question and Answer. This can be used at a later time if you forget your password.   6. Enter your e-mail address. This allows you to receive e-mail notifications when new information is available in [employer].  7. Click Sign Up. You can now view your health information.    For assistance activating your [employer] account, call [phone]

## 2017-07-27 NOTE — IP AVS SNAPSHOT
7/29/2017    Monster Miramontes  [address] [facility] [address]    Dear Monster:    [facility] wants to ensure your discharge home is safe and you or your loved ones have had all of your questions answered regarding your care after you leave the hospital.    Below is a list of resources and contact information should you have any questions regarding your hospital stay, follow-up instructions, or active medical symptoms.    Questions or Concerns Regarding… Contact   Medical Questions Related to Your Discharge  (7 days a week, 8am-5pm) Contact a Nurse Care Coordinator   [phone]   Medical Questions Not Related to Your Discharge  (24 hours a day / 7 days a week)  Contact the Nurse Health Line   [phone]    Medications or Discharge Instructions Refer to your discharge packet   or contact your [facility] Primary Care Provider   [phone]   Follow-up Appointment(s) Schedule your appointment via [employer]   or contact Scheduling [phone]   Billing Review your statement via [employer]  or contact Billing [phone]   Medical Records Review your records via [employer]   or contact Medical Records [phone]     You may receive a telephone call within two days of discharge. This call is to make certain you understand your discharge instructions and have the opportunity to have any questions answered. You can also easily access your medical information, test results and upcoming appointments via the [employer] free online health management tool. You can learn more and sign up at [employer]/Movigo. For assistance setting up your [employer] account, please call [phone].    Once again, we want to ensure your discharge home is safe and that you have a clear understanding of any next steps in your care. If you have any questions or concerns, please do not hesitate to contact us, we are here for you. Thank you for choosing [facility] for your healthcare needs.    Sincerely,    Your [facility] Healthcare Team

## 2017-07-27 NOTE — IP AVS SNAPSHOT
" Home Care Instructions                                                                                                                  Name:Monster Bryant [facility]  Medical Record Number:[id number]  CSN: [id number]    YOB: 1928   Age: 89 y.o.  Sex: male  HT:1.88 m (6' 2\") WT: 74.3 kg (163 lb 12.8 oz)          Admit Date: 7/27/2017     Discharge Date:   Today's Date: 7/29/2017  Attending Doctor:  Monster East M.D.                  Allergies:  Clonidine            Discharge Instructions       Discharge Instructions    Discharged to home by car with relative. Discharged via wheelchair, hospital escort: Yes.  Special equipment needed: Not Applicable    Be sure to schedule a follow-up appointment with your primary care doctor or any specialists as instructed.     Discharge Plan:   Diet Plan: Discussed  Activity Level: Discussed  Confirmed Follow up Appointment: Appointment Scheduled  Confirmed Symptoms Management: Discussed  Medication Reconciliation Updated: Yes  Pneumococcal Vaccine Given - only chart on this line when given: Given (See MAR)  Influenza Vaccine Indication: Indicated: Not available from distributor/    I understand that a diet low in cholesterol, fat, and sodium is recommended for good health. Unless I have been given specific instructions below for another diet, I accept this instruction as my diet prescription.   Other diet: Cardiac, Diabetic    Special Instructions: None    · Is patient discharged on Warfarin / Coumadin?   Yes    You are receiving the drug warfarin. Please understand the importance of monitoring warfarin with scheduled PT/INR blood draws.  Follow-up with a call to your personal Doctor's office in 3 days to schedule a PT/INR. .    IMPORTANT: HOW TO USE THIS INFORMATION:  This is a summary and does NOT have all possible information about this product. This information does not assure that this product is safe, effective, or appropriate for you. This information " "is not individual medical advice and does not substitute for the advice of your health care professional. Always ask your health care professional for complete information about this product and your specific health needs.      WARFARIN - ORAL (WARF-uh-rin)      COMMON BRAND NAME(S): Coumadin      WARNING:  Warfarin can cause very serious (possibly fatal) bleeding. This is more likely to occur when you first start taking this medication or if you take too much warfarin. To decrease your risk for bleeding, your doctor or other health care provider will monitor you closely and check your lab results (INR test) to make sure you are not taking too much warfarin. Keep all medical and laboratory appointments. Tell your doctor right away if you notice any signs of serious bleeding. See also Side Effects section.      USES:  This medication is used to treat blood clots (such as in deep vein thrombosis-DVT or pulmonary embolus-PE) and/or to prevent new clots from forming in your body. Preventing harmful blood clots helps to reduce the risk of a stroke or heart attack. Conditions that increase your risk of developing blood clots include a certain type of irregular heart rhythm (atrial fibrillation), heart valve replacement, recent heart attack, and certain surgeries (such as hip/knee replacement). Warfarin is commonly called a \"blood thinner,\" but the more correct term is \"anticoagulant.\" It helps to keep blood flowing smoothly in your body by decreasing the amount of certain substances (clotting proteins) in your blood.      HOW TO USE:  Read the Medication Guide provided by your pharmacist before you start taking warfarin and each time you get a refill. If you have any questions, ask your doctor or pharmacist. Take this medication by mouth with or without food as directed by your doctor or other health care professional, usually once a day. It is very important to take it exactly as directed. Do not increase the dose, take " it more frequently, or stop using it unless directed by your doctor. Dosage is based on your medical condition, laboratory tests (such as INR), and response to treatment. Your doctor or other health care provider will monitor you closely while you are taking this medication to determine the right dose for you. Use this medication regularly to get the most benefit from it. To help you remember, take it at the same time each day. It is important to eat a balanced, consistent diet while taking warfarin. Some foods can affect how warfarin works in your body and may affect your treatment and dose. Avoid sudden large increases or decreases in your intake of foods high in vitamin K (such as broccoli, cauliflower, cabbage, brussels sprouts, kale, spinach, and other green leafy vegetables, liver, green tea, certain vitamin supplements). If you are trying to lose weight, check with your doctor before you try to go on a diet. Cranberry products may also affect how your warfarin works. Limit the amount of cranberry juice (16 ounces/480 milliliters a day) or other cranberry products you may drink or eat.      SIDE EFFECTS:  Nausea, loss of appetite, or stomach/abdominal pain may occur. If any of these effects persist or worsen, tell your doctor or pharmacist promptly. Remember that your doctor has prescribed this medication because he or she has judged that the benefit to you is greater than the risk of side effects. Many people using this medication do not have serious side effects. This medication can cause serious bleeding if it affects your blood clotting proteins too much (shown by unusually high INR lab results). Even if your doctor stops your medication, this risk of bleeding can continue for up to a week. Tell your doctor right away if you have any signs of serious bleeding, including: unusual pain/swelling/discomfort, unusual/easy bruising, prolonged bleeding from cuts or gums, persistent/frequent nosebleeds, unusually  heavy/prolonged menstrual flow, pink/dark urine, coughing up blood, vomit that is bloody or looks like coffee grounds, severe headache, dizziness/fainting, unusual or persistent tiredness/weakness, bloody/black/tarry stools, chest pain, shortness of breath, difficulty swallowing. Tell your doctor right away if any of these unlikely but serious side effects occur: persistent nausea/vomiting, severe stomach/abdominal pain, yellowing eyes/skin. This drug rarely has caused very serious (possibly fatal) problems if its effects lead to small blood clots (usually at the beginning of treatment). This can lead to severe skin/tissue damage that may require surgery or amputation if left untreated. Patients with certain blood conditions (protein C or S deficiency) may be at greater risk. Get medical help right away if any of these rare but serious side effects occur: painful/red/purplish patches on the skin (such as on the toe, breast, abdomen), change in the amount of urine, vision changes, confusion, slurred speech, weakness on one side of the body. A very serious allergic reaction to this drug is rare. However, get medical help right away if you notice any symptoms of a serious allergic reaction, including: rash, itching/swelling (especially of the face/tongue/throat), severe dizziness, trouble breathing. This is not a complete list of possible side effects. If you notice other effects not listed above, contact your doctor or pharmacist. In the [place] - Call your doctor for medical advice about side effects. You may report side effects to FDA at 5-081-IBU-0697. In [place] - Call your doctor for medical advice about side effects. You may report side effects to [facility] [place] at [phone].      PRECAUTIONS:  Before taking warfarin, tell your doctor or pharmacist if you are allergic to it; or if you have any other allergies. This product may contain inactive ingredients, which can cause allergic reactions or other problems. Talk  to your pharmacist for more details. Before using this medication, tell your doctor or pharmacist your medical history, especially of: blood disorders (such as anemia, hemophilia), bleeding problems (such as bleeding of the stomach/intestines, bleeding in the brain), blood vessel disorders (such as aneurysms), recent major injury/surgery, liver disease, alcohol use, mental/mood disorders (including memory problems), frequent falls/injuries. It is important that all your doctors and dentists know that you take warfarin. Before having surgery or any medical/dental procedures, tell your doctor or dentist that you are taking this medication and about all the products you use (including prescription drugs, nonprescription drugs, and herbal products). Avoid getting injections into the muscles. If you must have an injection into a muscle (for example, a flu shot), it should be given in the arm. This way, it will be easier to check for bleeding and/or apply pressure bandages. This medication may cause stomach bleeding. Daily use of alcohol while using this medicine will increase your risk for stomach bleeding and may also affect how this medication works. Limit or avoid alcoholic beverages. If you have not been eating well, if you have an illness or infection that causes fever, vomiting, or diarrhea for more than 2 days, or if you start using any antibiotic medications, contact your doctor or pharmacist immediately because these conditions can affect how warfarin works. This medication can cause heavy bleeding. To lower the chance of getting cut, bruised, or injured, use great caution with sharp objects like safety razors and nail cutters. Use an electric razor when shaving and a soft toothbrush when brushing your teeth. Avoid activities such as contact sports. If you fall or injure yourself, especially if you hit your head, call your doctor immediately. Your doctor may need to check you. The Food & Drug Administration has  "stated that generic warfarin products are interchangeable. However, consult your doctor or pharmacist before switching warfarin products. Be careful not to take more than one medication that contains warfarin unless specifically directed by the doctor or health care provider who is monitoring your warfarin treatment. Older adults may be at greater risk for bleeding while using this drug. This medication is not recommended for use during pregnancy because of serious (possibly fatal) harm to an unborn baby. Discuss the use of reliable forms of birth control with your doctor. If you become pregnant or think you may be pregnant, tell your doctor immediately. If you are planning pregnancy, discuss a plan for managing your condition with your doctor before you become pregnant. Your doctor may switch the type of medication you use during pregnancy. Very small amounts of this medication may pass into breast milk but is unlikely to harm a nursing infant. Consult your doctor before breast-feeding.      DRUG INTERACTIONS:  Drug interactions may change how your medications work or increase your risk for serious side effects. This document does not contain all possible drug interactions. Keep a list of all the products you use (including prescription/nonprescription drugs and herbal products) and share it with your doctor and pharmacist. Do not start, stop, or change the dosage of any medicines without your doctor's approval. Warfarin interacts with many prescription, nonprescription, vitamin, and herbal products. This includes medications that are applied to the skin or inside the vagina or rectum. The interactions with warfarin usually result in an increase or decrease in the \"blood-thinning\" (anticoagulant) effect. Your doctor or other health care professional should closely monitor you to prevent serious bleeding or clotting problems. While taking warfarin, it is very important to tell your doctor or pharmacist of any " changes in medications, vitamins, or herbal products that you are taking. Some products that may interact with this drug include: capecitabine, imatinib, mifepristone. Aspirin, aspirin-like drugs (salicylates), and nonsteroidal anti-inflammatory drugs (NSAIDs such as ibuprofen, naproxen, celecoxib) may have effects similar to warfarin. These drugs may increase the risk of bleeding problems if taken during treatment with warfarin. Carefully check all prescription/nonprescription product labels (including drugs applied to the skin such as pain-relieving creams) since the products may contain NSAIDs or salicylates. Talk to your doctor about using a different medication (such as acetaminophen) to treat pain/fever. Low-dose aspirin and related drugs (such as clopidogrel, ticlopidine) should be continued if prescribed by your doctor for specific medical reasons such as heart attack or stroke prevention. Consult your doctor or pharmacist for more details. Many herbal products interact with warfarin. Tell your doctor before taking any herbal products, especially bromelains, coenzyme Q10, cranberry, danshen, dong quai, fenugreek, garlic, ginkgo biloba, ginseng, and Sunitha's wort, among others. This medication may interfere with a certain laboratory test to measure theophylline levels, possibly causing false test results. Make sure laboratory personnel and all your doctors know you use this drug.      OVERDOSE:  If overdose is suspected, contact a poison control center or emergency room immediately. [place] residents can call the [place] National Poison Hotline at 1-508.696.1936. [place] residents can call a provincial poison control center. Symptoms of overdose may include: bloody/black/tarry stools, pink/dark urine, unusual/prolonged bleeding.      NOTES:  Do not share this medication with others. Laboratory and/or medical tests (such as INR, complete blood count) must be performed periodically to monitor your progress or check for  side effects. Consult your doctor for more details.      MISSED DOSE:  For the best possible benefit, do not miss any doses. If you do miss a dose and remember on the same day, take it as soon as you remember. If you remember on the next day, skip the missed dose and resume your usual dosing schedule. Do not double the dose to catch up because this could increase your risk for bleeding. Keep a record of missed doses to give to your doctor or pharmacist. Contact your doctor or pharmacist if you miss 2 or more doses in a row.      STORAGE:  Store at room temperature away from light and moisture. Do not store in the bathroom. Keep all medications away from children and pets. Do not flush medications down the toilet or pour them into a drain unless instructed to do so. Properly discard this product when it is  or no longer needed. Consult your pharmacist or local waste disposal company for more details about how to safely discard your product.      MEDICAL ALERT:  Your condition and medication can cause complications in a medical emergency. For information about enrolling in MedicAlert, call 1-152.202.7304 ([place]) or 1-309.539.8963 ([place]).      Information last revised 2010 Copyright(c) 2010 [employer]             · Is patient Post Blood Transfusion?  No    Depression / Suicide Risk    As you are discharged from this [facility] facility, it is important to learn how to keep safe from harming yourself.    Recognize the warning signs:  · Abrupt changes in personality, positive or negative- including increase in energy   · Giving away possessions  · Change in eating patterns- significant weight changes-  positive or negative  · Change in sleeping patterns- unable to sleep or sleeping all the time   · Unwillingness or inability to communicate  · Depression  · Unusual sadness, discouragement and loneliness  · Talk of wanting to die  · Neglect of personal appearance   · Rebelliousness- reckless  behavior  · Withdrawal from people/activities they love  · Confusion- inability to concentrate     If you or a loved one observes any of these behaviors or has concerns about self-harm, here's what you can do:  · Talk about it- your feelings and reasons for harming yourself  · Remove any means that you might use to hurt yourself (examples: pills, rope, extension cords, firearm)  · Get professional help from the community (Mental Health, Substance Abuse, psychological counseling)  · Do not be alone:Call your Safe Contact- someone whom you trust who will be there for you.  · Call your local CRISIS HOTLINE 976-7361 or 392-749-3783  · Call your local Children's Mobile Crisis Response Team [place] (675) 498-5530 or www.Statusly  · Call the toll free National Suicide Prevention Hotlines   · National Suicide Prevention Lifeline 871-421-FOEU (8977)  · [employer] Line Network 800-SUICIDE (517-1506)        Syncope  Syncope is a medical term for fainting or passing out. This means you lose consciousness and drop to the ground. People are generally unconscious for less than 5 minutes. You may have some muscle twitches for up to 15 seconds before waking up and returning to normal. Syncope occurs more often in older adults, but it can happen to anyone. While most causes of syncope are not dangerous, syncope can be a sign of a serious medical problem. It is important to seek medical care.   CAUSES   Syncope is caused by a sudden drop in blood flow to the brain. The specific cause is often not determined. Factors that can bring on syncope include:  · Taking medicines that lower blood pressure.  · Sudden changes in posture, such as standing up quickly.  · Taking more medicine than prescribed.  · Standing in one place for too long.  · Seizure disorders.  · Dehydration and excessive exposure to heat.  · Low blood sugar (hypoglycemia).  · Straining to have a bowel movement.  · Heart disease, irregular heartbeat, or  other circulatory problems.  · Fear, emotional distress, seeing blood, or severe pain.  SYMPTOMS   Right before fainting, you may:  · Feel dizzy or light-headed.  · Feel nauseous.  · See all white or all black in your field of vision.  · Have cold, clammy skin.  DIAGNOSIS   Your health care provider will ask about your symptoms, perform a physical exam, and perform an electrocardiogram (ECG) to record the electrical activity of your heart. Your health care provider may also perform other heart or blood tests to determine the cause of your syncope which may include:  · Transthoracic echocardiogram (TTE). During echocardiography, sound waves are used to evaluate how blood flows through your heart.  · Transesophageal echocardiogram (SCOTT).  · Cardiac monitoring. This allows your health care provider to monitor your heart rate and rhythm in real time.  · Holter monitor. This is a portable device that records your heartbeat and can help diagnose heart arrhythmias. It allows your health care provider to track your heart activity for several days, if needed.  · Stress tests by exercise or by giving medicine that makes the heart beat faster.  TREATMENT   In most cases, no treatment is needed. Depending on the cause of your syncope, your health care provider may recommend changing or stopping some of your medicines.  HOME CARE INSTRUCTIONS  · Have someone stay with you until you feel stable.  · Do not drive, use machinery, or play sports until your health care provider says it is okay.  · Keep all follow-up appointments as directed by your health care provider.  · Lie down right away if you start feeling like you might faint. Breathe deeply and steadily. Wait until all the symptoms have passed.  · Drink enough fluids to keep your urine clear or pale yellow.  · If you are taking blood pressure or heart medicine, get up slowly and take several minutes to sit and then stand. This can reduce dizziness.  SEEK IMMEDIATE MEDICAL  CARE IF:   · You have a severe headache.  · You have unusual pain in the chest, abdomen, or back.  · You are bleeding from your mouth or rectum, or you have black or tarry stool.  · You have an irregular or very fast heartbeat.  · You have pain with breathing.  · You have repeated fainting or seizure-like jerking during an episode.  · You faint when sitting or lying down.  · You have confusion.  · You have trouble walking.  · You have severe weakness.  · You have vision problems.  If you fainted, call your local emergency services (911 in [place] Do not drive yourself to the hospital.      This information is not intended to replace advice given to you by your health care provider. Make sure you discuss any questions you have with your health care provider.     Document Released: 12/18/2006 Document Revised: 05/03/2016 Document Reviewed: 02/15/2013  [employer] Interactive Patient Education ©2016 [employer]        Your appointments     Aug 04, 2017  3:15 PM   PACER CHECK ONLY with PACER CHECK-[facility] 2   [facility] (--)    [address] [facility] [address]  [facility] [address]   [phone]            Aug 09, 2017  8:40 AM   FOLLOW UP with Junior Shelely M.D.   [facility] (--)    [address] [facility] [address]  [facility] [address]   [phone]              Follow-up Information     1. Follow up with Jose Hayes M.D..    Specialty:  Family Medicine    Why:  As needed    Contact information    [address] [place] [id number] [phone]           Discharge Medication Instructions:    Below are the medications your physician expects you to take upon discharge:    Review all your home medications and newly ordered medications with your doctor and/or pharmacist. Follow medication instructions as directed by your doctor and/or pharmacist.    Please keep your medication list with you and share with your physician.               Medication List         CONTINUE taking these medications        Instructions    Morning Afternoon Evening Bedtime    betamethasone dipropionate 0.05 % lotion        Apply 1 Application to affected area(s) every bedtime.   Dose:  1 Application                        finasteride 5 MG Tabs   Last time this was given:  5 mg on 7/29/2017 10:03 AM   Commonly known as:  PROSCAR        Take 5 mg by mouth every day.   Dose:  5 mg                        furosemide 40 MG Tabs   Commonly known as:  LASIX        Take 80 mg by mouth every day.   Dose:  80 mg                        glimepiride 2 MG Tabs   Last time this was given:  2 mg on 7/29/2017 10:04 AM   Commonly known as:  AMARYL        Take 2 mg by mouth every morning.   Dose:  2 mg                        levothyroxine 100 MCG Tabs   Last time this was given:  100 mcg on 7/29/2017 10:03 AM   Commonly known as:  SYNTHROID        Take 100 mcg by mouth every day.   Dose:  100 mcg                        lisinopril 5 MG Tabs   Commonly known as:  PRINIVIL        Doctor's comments:  This is a refill in response to request sent in on 5/7/16   Take 1 Tab by mouth every day.   Dose:  5 mg                        metoprolol SR 25 MG Tb24   Last time this was given:  25 mg on 7/29/2017 10:03 AM   Commonly known as:  TOPROL XL        Take 1 Tab by mouth every day.   Dose:  25 mg                        spironolactone 25 MG Tabs   Commonly known as:  ALDACTONE        Take 25 mg by mouth every day.   Dose:  25 mg                        tamsulosin 0.4 MG capsule   Last time this was given:  0.8 mg on 7/29/2017 10:03 AM   Commonly known as:  FLOMAX        Take 0.4 mg by mouth ONE-HALF HOUR AFTER BREAKFAST.   Dose:  0.4 mg                        warfarin 5 MG Tabs   Last time this was given:  7.5 mg on 7/28/2017  8:37 PM   Commonly known as:  COUMADIN        Take 5-7.5 mg by mouth every day. 7.5mg on Monday/Wednesday/Friday, 5mg on all other days   Dose:  5-7.5 mg                                Orders for after  discharge     REFERRAL TO HOME HEALTH    Complete by:  As directed    Home health will create and establish a plan of care             Instructions           Diet / Nutrition:    Follow any diet instructions given to you by your doctor or the dietician, including how much salt (sodium) you are allowed each day.    If you are overweight, talk to your doctor about a weight reduction plan.    Activity:    Remain physically active following your doctor's instructions about exercise and activity.    Rest often.     Any time you become even a little tired or short of breath, SIT DOWN and rest.    Worsening Symptoms:    Report any of the following signs and symptoms to the doctor's office immediately:    *Pain of jaw, arm, or neck  *Chest pain not relieved by medication                               *Dizziness or loss of consciousness  *Difficulty breathing even when at rest   *More tired than usual                                       *Bleeding drainage or swelling of surgical site  *Swelling of feet, ankles, legs or stomach                 *Fever (>100ºF)  *Pink or blood tinged sputum  *Weight gain (3lbs/day or 5lbs /week)           *Shock from internal defibrillator (if applicable)  *Palpitations or irregular heartbeats                *Cool and/or numb extremities    Stroke Awareness    Common Risk Factors for Stroke include:    Age  Atrial Fibrillation  Carotid Artery Stenosis  Diabetes Mellitus  Excessive alcohol consumption  High blood pressure  Overweight   Physical inactivity  Smoking    Warning signs and symptoms of a stroke include:    *Sudden numbness or weakness of the face, arm or leg (especially on one side of the body).  *Sudden confusion, trouble speaking or understanding.  *Sudden trouble seeing in one or both eyes.  *Sudden trouble walking, dizziness, loss of balance or coordination.Sudden severe headache with no known cause.    It is very important to get treatment quickly when a stroke occurs. If you  experience any of the above warning signs, call 911 immediately.                   Disclaimer         Quit Smoking / Tobacco Use:    I understand the use of any tobacco products increases my chance of suffering from future heart disease or stroke and could cause other illnesses which may shorten my life. Quitting the use of tobacco products is the single most important thing I can do to improve my health. For further information on smoking / tobacco cessation call a Toll Free Quit Line at 1-300.662.1915 (*[employer] [place]) or 1-796.630.6963 (American [employer] Association) or you can access the web based program at www.lungusa.org.    [place] Tobacco Users Help Line:  (798) 131-8203       Toll Free: 1-622.509.9764  Quit Tobacco Program [facility] (108)[phone]    Crisis Hotline:    [place] Crisis Hotline:  3-271-HUBMDJJ or 1-965.585.2295    [place] Crisis Hotline:    1-844.480.4516 or 504-089-2597    Discharge Survey:   Thank you for choosing [facility]. We hope we did everything we could to make your hospital stay a pleasant one. You may be receiving a phone survey and we would appreciate your time and participation in answering the questions. Your input is very valuable to us in our efforts to improve our service to our patients and their families.        My signature on this form indicates that:    1. I have reviewed and understand the above information.  2. My questions regarding this information have been answered to my satisfaction.  3. I have formulated a plan with my discharge nurse to obtain my prescribed medications for home.                  Disclaimer         __________________________________                     __________       ________                       Patient Signature                                                 Date                    Time

## 2017-07-27 NOTE — ED PROVIDER NOTES
ED Provider Note    Scribed for Romaine Brandon M.D. by Katie Springer. 7/27/2017, 3:37 PM.    Primary care provider: Jose Hayes M.D.  Means of arrival: med flight  History obtained from: patient  History limited by: hard of hearing    CHIEF COMPLAINT  Chief Complaint   Patient presents with   • Syncope       HPI  Monster Miramontes is a 89 y.o. Male with a history of hypertension, CHF, and afib who presents to the Emergency Department for evaluation following a syncopal accident that occurred earlier today. Patient was driving to lunch, parked his car, got out, began to walk, and experienced the syncopal incident. He does have a pace maker in place, however, is unsure what brand it is with his Cardiologist is Myke. Per nursing note patient received 0.5mg Atropin prior to arival for a HR in the 30s. Patient is a very poor historian, but otherwise at this point has no complaints.  No complaints of shortness of breath, chest pain, fevers.    History limited secondary to patient's hard of hearing.    REVIEW OF SYSTEMS  Review of Systems   Constitutional: Negative for fever.   Respiratory: Negative for shortness of breath.    Cardiovascular: Negative for chest pain.   Neurological: Positive for loss of consciousness.   All other systems reviewed and are negative.  Limited secondary to patient being hard of hearing    PAST MEDICAL HISTORY   has a past medical history of Unspecified hemorrhagic conditions; Cancer (CMS-[facility]) (2007); Diabetes; Hypertension; Congestive heart failure (CMS-HCC); Arrhythmia; Pacemaker; Atrial fibrillation (CMS-HCC); Breath shortness; Snoring; Stroke (CMS-HCC) (2010); CATARACT; Heart murmur; and Unspecified disorder of thyroid.    SURGICAL HISTORY   has past surgical history that includes bladder biopsy with cystoscopy (7/3/08); trans urethral resection bladder (7/3/08); pacemaker insertion (2008); recovery (9/21/2012); and aortic valve replacement (9/25/2012).    SOCIAL  "HISTORY  Social History   Substance Use Topics   • Smoking status: Former Smoker -- 2.00 packs/day for 28 years     Types: Cigarettes     Quit date: 09/07/1972   • Smokeless tobacco: Current User     Types: Chew   • Alcohol Use: Yes      Comment: 3 drinks per day      History   Drug Use No       FAMILY HISTORY  History reviewed. No pertinent family history.    CURRENT MEDICATIONS  Home Medications     Reviewed by Nithin Andrew R.N. (Registered Nurse) on 07/27/17 at 1500  Med List Status: Not Addressed    Medication Last Dose Status    betamethasone dipropionate 0.05 % lotion 4/28/2017 Active    docusate sodium (COLACE) 100 MG CAPS 4/28/2017 Active    finasteride (PROSCAR) 5 MG TABS 4/28/2017 Active    furosemide (LASIX) 40 MG TABS  Active    glimepiride (AMARYL) 2 MG TABS 4/28/2017 Active    levothyroxine (SYNTHROID) 100 MCG TABS 4/28/2017 Active    lisinopril (PRINIVIL) 5 MG Tab 4/28/2017 Active    metoprolol SR (TOPROL XL) 25 MG TABLET SR 24 HR  Active    spironolactone (ALDACTONE) 25 MG TABS 4/28/2017 Active    tamsulosin (FLOMAX) 0.4 MG capsule 4/28/2017 Active    warfarin (COUMADIN) 5 MG TABS 4/28/2017 Active                ALLERGIES  Allergies   Allergen Reactions   • Clonidine Rash     ,   • Other Drug Rash     Unknown medication (patch) used to lower blood pressure       PHYSICAL EXAM  VITAL SIGNS: /63 mmHg  Pulse 68  Temp(Src) 36.7 °C (98 °F)  Resp 16  Ht 1.88 m (6' 2\")  Wt 81.647 kg (180 lb)  BMI 23.10 kg/m2  SpO2 98%    Constitutional: Elderly in appearance, No acute distress, Non-toxic appearance.   HENT: Normocephalic, Atraumatic, Bilateral external ears normal, oropharynx moist, No oral exudates, Nose normal.   Eyes: Pupils are equal round and react to light, extraocular motions are intact, conjunctiva is normal, there are no signs of exudate.   Neck: Supple, no meningeal signs.  Lymphatic: No lymphadenopathy noted.   Cardiovascular: diminished heart tones. bradycardic rate and rhythm " without murmurs gallops or rubs.   Thorax & Lungs: diminished breath sounds bilaterally. No respiratory distress. Breathing comfortably. Lungs are clear to auscultation bilaterally, there are no wheezes no rales. Chest wall is nontender.  Abdomen: Soft, nontender, nondistended. Bowel sounds are present.   Skin: Warm, Dry, No erythema,   Back: No tenderness, No CVA tenderness.  Musculoskeletal: Good range of motion in all major joints. No tenderness to palpation or major deformities noted. Intact distal pulses, no clubbing, no cyanosis, no edema,   Neurologic: Alert & oriented x 3, Moving all extremities. No gross abnormalities.    Psychiatric: Affect normal, Judgment normal, Mood normal.     LABS  Results for orders placed or performed during the hospital encounter of 07/27/17   CBC with Differential   Result Value Ref Range    WBC 7.3 4.8 - 10.8 K/uL    RBC 4.09 (L) 4.70 - 6.10 M/uL    Hemoglobin 13.8 (L) 14.0 - 18.0 g/dL    Hematocrit 39.2 (L) 42.0 - 52.0 %    MCV 95.8 81.4 - 97.8 fL    MCH 33.7 (H) 27.0 - 33.0 pg    MCHC 35.2 33.7 - 35.3 g/dL    RDW 43.7 35.9 - 50.0 fL    Platelet Count 225 164 - 446 K/uL    MPV 9.1 9.0 - 12.9 fL    Neutrophils-Polys 80.80 (H) 44.00 - 72.00 %    Lymphocytes 7.40 (L) 22.00 - 41.00 %    Monocytes 10.50 0.00 - 13.40 %    Eosinophils 0.40 0.00 - 6.90 %    Basophils 0.40 0.00 - 1.80 %    Immature Granulocytes 0.50 0.00 - 0.90 %    Nucleated RBC 0.00 /100 WBC    Neutrophils (Absolute) 5.92 1.82 - 7.42 K/uL    Lymphs (Absolute) 0.54 (L) 1.00 - 4.80 K/uL    Monos (Absolute) 0.77 0.00 - 0.85 K/uL    Eos (Absolute) 0.03 0.00 - 0.51 K/uL    Baso (Absolute) 0.03 0.00 - 0.12 K/uL    Immature Granulocytes (abs) 0.04 0.00 - 0.11 K/uL    NRBC (Absolute) 0.00 K/uL   Complete Metabolic Panel (CMP)   Result Value Ref Range    Sodium 129 (L) 135 - 145 mmol/L    Potassium 4.3 3.6 - 5.5 mmol/L    Chloride 94 (L) 96 - 112 mmol/L    Co2 23 20 - 33 mmol/L    Anion Gap 12.0 (H) 0.0 - 11.9    Glucose 152  (H) 65 - 99 mg/dL    Bun 34 (H) 8 - 22 mg/dL    Creatinine 1.24 0.50 - 1.40 mg/dL    Calcium 9.2 8.5 - 10.5 mg/dL    AST(SGOT) 20 12 - 45 U/L    ALT(SGPT) 10 2 - 50 U/L    Alkaline Phosphatase 98 30 - 99 U/L    Total Bilirubin 1.2 0.1 - 1.5 mg/dL    Albumin 3.7 3.2 - 4.9 g/dL    Total Protein 6.9 6.0 - 8.2 g/dL    Globulin 3.2 1.9 - 3.5 g/dL    A-G Ratio 1.2 g/dL   Prothrombin Time (PT/INR)   Result Value Ref Range    PT 27.1 (H) 12.0 - 14.6 sec    INR 2.42 (H) 0.87 - 1.13   APTT   Result Value Ref Range    APTT 37.3 (H) 24.7 - 36.0 sec   Lipase   Result Value Ref Range    Lipase 10 (L) 11 - 82 U/L   Troponin STAT   Result Value Ref Range    Troponin I 0.03 0.00 - 0.04 ng/mL   ESTIMATED GFR   Result Value Ref Range    GFR If African American >60 >60 mL/min/1.73 m 2    GFR If Non African American 55 (A) >60 mL/min/1.73 m 2   EKG (NOW)   Result Value Ref Range    Report       [facility] Emergency Dept.    Test Date:  2017  Pt Name:    GEORGI SPENCER              Department: ER  MRN:        [id number]                      Room:       [facility]  Gender:     M                            Technician: [id number]  :        [id number]                   Requested By:ER TRIAGE PROTOCOL  Order #:    [id number]                    Reading MD: DAVIS GARDNER MD    Measurements  Intervals                                Axis  Rate:       55                           P:  SD:                                      QRS:        -66  QRSD:       158                          T:          103  QT:         488  QTc:        467    Interpretive Statements  ATRIAL FIBRILLATION, V-RATE  48- 67  NONSPECIFIC IVCD WITH LAD  Compared to ECG 2017 16:05:12  Intraventricular conduction delay now present  Ventricular-paced complex(es) or rhythm no longer present    Electronically Signed On 2017 15:52:29 PDT by DAVIS GARDNER MD         All labs reviewed by me.    EKG  As above    RADIOLOGY  DX-CHEST-PORTABLE (1 VIEW)    Final Result      No consolidation.      ECHOCARDIOGRAM COMP W/O CONT    (Results Pending)     The radiologist's interpretation of all radiological studies have been reviewed by me.    COURSE & MEDICAL DECISION MAKING  Pertinent Labs & Imaging studies reviewed. (See chart for details)    3:37 PM - Patient seen and examined at bedside. Ordered chest xray, TSH, CBC, CMP, BNP, prothrombin time, APTT, lipase, troponin, estimated GFR, and EKG to evaluate his symptoms. The differential diagnoses include but are not limited to: bradycardia, pacemaker malfunction . I informed the patient that his syncopal episode suggests a malfunction with his pace maker. I explained that we would have to interrogate it and he will most likely be admitted to hospital.    3:49 PM Paged Cardiology.    4:06 PM Spoke with Dr. Larkin, Cardiologist, about the patient's condition. Agrees to follow.    4:23 PM Spoke with Dr. Sanderson, Hospitalist, about the patient's condition. Agrees to admit.      Decision Making:  Patient was as for evaluation. Clinically, the patient is in atrial fibrillation appears to have a normal blood pressure at this time. Based on the story that the patient has a pacemaker and his rates were in the 20s 30s range is concerning. I did speak to cardiology, who will evaluate the patient and have the patient's pacemaker interrogated. Will admit the patient for further observation. I spoken the hospitalist for this.    DISPOSITION:  Patient will be admitted to Dr. Sanderson, Hospitalist in guarded condition.       FINAL IMPRESSION  1. Bradycardia    2. Near syncope    3. Hypotension, unspecified hypotension type          I, Katie Springer (More), am scribing for, and in the presence of, Romaine Brandon M.D..    Electronically signed by: Katie Springer (More), 7/27/2017    IRomaine M.D. personally performed the services described in this documentation, as scribed by Katie Springer in my presence, and it is both  accurate and complete.    The note accurately reflects work and decisions made by me.  Romaine Brandon  7/27/2017  10:06 PM

## 2017-07-28 LAB
ALBUMIN SERPL BCP-MCNC: 3.3 G/DL (ref 3.2–4.9)
BASOPHILS # BLD AUTO: 0 % (ref 0–1.8)
BASOPHILS # BLD: 0 K/UL (ref 0–0.12)
BUN SERPL-MCNC: 39 MG/DL (ref 8–22)
CALCIUM SERPL-MCNC: 9.1 MG/DL (ref 8.5–10.5)
CHLORIDE SERPL-SCNC: 95 MMOL/L (ref 96–112)
CO2 SERPL-SCNC: 26 MMOL/L (ref 20–33)
CORTIS SERPL-MCNC: 11.8 UG/DL (ref 0–23)
CREAT SERPL-MCNC: 1.33 MG/DL (ref 0.5–1.4)
EKG IMPRESSION: NORMAL
EOSINOPHIL # BLD AUTO: 0.05 K/UL (ref 0–0.51)
EOSINOPHIL NFR BLD: 0.9 % (ref 0–6.9)
ERYTHROCYTE [DISTWIDTH] IN BLOOD BY AUTOMATED COUNT: 45 FL (ref 35.9–50)
GFR SERPL CREATININE-BSD FRML MDRD: 51 ML/MIN/1.73 M 2
GLUCOSE SERPL-MCNC: 127 MG/DL (ref 65–99)
HCT VFR BLD AUTO: 38.4 % (ref 42–52)
HGB BLD-MCNC: 13.2 G/DL (ref 14–18)
INR PPP: 2.47 (ref 0.87–1.13)
LG PLATELETS BLD QL SMEAR: NORMAL
LV EJECT FRACT  99904: 30
LV EJECT FRACT MOD 2C 99903: 25.62
LV EJECT FRACT MOD 4C 99902: 45.5
LV EJECT FRACT MOD BP 99901: 34.49
LYMPHOCYTES # BLD AUTO: 0.46 K/UL (ref 1–4.8)
LYMPHOCYTES NFR BLD: 8.8 % (ref 22–41)
MANUAL DIFF BLD: NORMAL
MCH RBC QN AUTO: 33.6 PG (ref 27–33)
MCHC RBC AUTO-ENTMCNC: 34.4 G/DL (ref 33.7–35.3)
MCV RBC AUTO: 97.7 FL (ref 81.4–97.8)
MONOCYTES # BLD AUTO: 0.55 K/UL (ref 0–0.85)
MONOCYTES NFR BLD AUTO: 10.5 % (ref 0–13.4)
MORPHOLOGY BLD-IMP: NORMAL
NEUTROPHILS # BLD AUTO: 4.15 K/UL (ref 1.82–7.42)
NEUTROPHILS NFR BLD: 79.8 % (ref 44–72)
NRBC # BLD AUTO: 0 K/UL
NRBC BLD AUTO-RTO: 0 /100 WBC
PHOSPHATE SERPL-MCNC: 3.6 MG/DL (ref 2.5–4.5)
PLATELET # BLD AUTO: 210 K/UL (ref 164–446)
PLATELET BLD QL SMEAR: NORMAL
PMV BLD AUTO: 9.3 FL (ref 9–12.9)
POTASSIUM SERPL-SCNC: 4.2 MMOL/L (ref 3.6–5.5)
PROTHROMBIN TIME: 27.5 SEC (ref 12–14.6)
RBC # BLD AUTO: 3.93 M/UL (ref 4.7–6.1)
RBC BLD AUTO: PRESENT
SODIUM SERPL-SCNC: 130 MMOL/L (ref 135–145)
T4 FREE SERPL-MCNC: 1.19 NG/DL (ref 0.53–1.43)
TROPONIN I SERPL-MCNC: 0.05 NG/ML (ref 0–0.04)
TSH SERPL DL<=0.005 MIU/L-ACNC: 0.63 UIU/ML (ref 0.3–3.7)
WBC # BLD AUTO: 5.2 K/UL (ref 4.8–10.8)

## 2017-07-28 PROCEDURE — 93306 TTE W/DOPPLER COMPLETE: CPT

## 2017-07-28 PROCEDURE — 93880 EXTRACRANIAL BILAT STUDY: CPT | Mod: 26 | Performed by: SURGERY

## 2017-07-28 PROCEDURE — 304853 HCHG PPM TEST CABLE

## 2017-07-28 PROCEDURE — A9270 NON-COVERED ITEM OR SERVICE: HCPCS | Performed by: HOSPITALIST

## 2017-07-28 PROCEDURE — G8979 MOBILITY GOAL STATUS: HCPCS | Mod: CI

## 2017-07-28 PROCEDURE — 700101 HCHG RX REV CODE 250

## 2017-07-28 PROCEDURE — 0JWT3PZ REVISION OF CARDIAC RHYTHM RELATED DEVICE IN TRUNK SUBCUTANEOUS TISSUE AND FASCIA, PERCUTANEOUS APPROACH: ICD-10-PCS | Performed by: INTERNAL MEDICINE

## 2017-07-28 PROCEDURE — 700102 HCHG RX REV CODE 250 W/ 637 OVERRIDE(OP)

## 2017-07-28 PROCEDURE — 90471 IMMUNIZATION ADMIN: CPT

## 2017-07-28 PROCEDURE — 85027 COMPLETE CBC AUTOMATED: CPT

## 2017-07-28 PROCEDURE — C1769 GUIDE WIRE: HCPCS

## 2017-07-28 PROCEDURE — 0JPT0PZ REMOVAL OF CARDIAC RHYTHM RELATED DEVICE FROM TRUNK SUBCUTANEOUS TISSUE AND FASCIA, OPEN APPROACH: ICD-10-PCS | Performed by: INTERNAL MEDICINE

## 2017-07-28 PROCEDURE — C2628 CATHETER, OCCLUSION: HCPCS

## 2017-07-28 PROCEDURE — C1892 INTRO/SHEATH,FIXED,PEEL-AWAY: HCPCS

## 2017-07-28 PROCEDURE — 51798 US URINE CAPACITY MEASURE: CPT

## 2017-07-28 PROCEDURE — 80069 RENAL FUNCTION PANEL: CPT

## 2017-07-28 PROCEDURE — 97165 OT EVAL LOW COMPLEX 30 MIN: CPT

## 2017-07-28 PROCEDURE — 93880 EXTRACRANIAL BILAT STUDY: CPT

## 2017-07-28 PROCEDURE — G8988 SELF CARE GOAL STATUS: HCPCS | Mod: CI

## 2017-07-28 PROCEDURE — C1887 CATHETER, GUIDING: HCPCS

## 2017-07-28 PROCEDURE — 99152 MOD SED SAME PHYS/QHP 5/>YRS: CPT

## 2017-07-28 PROCEDURE — 304952 HCHG R 2 PADS

## 2017-07-28 PROCEDURE — 360979 HCHG DIAGNOSTIC CATH

## 2017-07-28 PROCEDURE — 90670 PCV13 VACCINE IM: CPT | Performed by: HOSPITALIST

## 2017-07-28 PROCEDURE — 700111 HCHG RX REV CODE 636 W/ 250 OVERRIDE (IP)

## 2017-07-28 PROCEDURE — C2621 PMKR, OTHER THAN SING/DUAL: HCPCS

## 2017-07-28 PROCEDURE — 82533 TOTAL CORTISOL: CPT

## 2017-07-28 PROCEDURE — 0JH608Z INSERTION OF DEFIBRILLATOR GENERATOR INTO CHEST SUBCUTANEOUS TISSUE AND FASCIA, OPEN APPROACH: ICD-10-PCS | Performed by: INTERNAL MEDICINE

## 2017-07-28 PROCEDURE — 85610 PROTHROMBIN TIME: CPT

## 2017-07-28 PROCEDURE — 33233 REMOVAL OF PM GENERATOR: CPT

## 2017-07-28 PROCEDURE — 770020 HCHG ROOM/CARE - TELE (206)

## 2017-07-28 PROCEDURE — G8980 MOBILITY D/C STATUS: HCPCS | Mod: CI

## 2017-07-28 PROCEDURE — 36415 COLL VENOUS BLD VENIPUNCTURE: CPT

## 2017-07-28 PROCEDURE — G8987 SELF CARE CURRENT STATUS: HCPCS | Mod: CI

## 2017-07-28 PROCEDURE — C1900 LEAD, CORONARY VENOUS: HCPCS

## 2017-07-28 PROCEDURE — 93010 ELECTROCARDIOGRAM REPORT: CPT | Performed by: INTERNAL MEDICINE

## 2017-07-28 PROCEDURE — 97161 PT EVAL LOW COMPLEX 20 MIN: CPT

## 2017-07-28 PROCEDURE — 85007 BL SMEAR W/DIFF WBC COUNT: CPT

## 2017-07-28 PROCEDURE — 93306 TTE W/DOPPLER COMPLETE: CPT | Mod: 26 | Performed by: INTERNAL MEDICINE

## 2017-07-28 PROCEDURE — 93005 ELECTROCARDIOGRAM TRACING: CPT | Performed by: INTERNAL MEDICINE

## 2017-07-28 PROCEDURE — 700111 HCHG RX REV CODE 636 W/ 250 OVERRIDE (IP): Performed by: HOSPITALIST

## 2017-07-28 PROCEDURE — C1894 INTRO/SHEATH, NON-LASER: HCPCS

## 2017-07-28 PROCEDURE — 84443 ASSAY THYROID STIM HORMONE: CPT

## 2017-07-28 PROCEDURE — 33225 L VENTRIC PACING LEAD ADD-ON: CPT

## 2017-07-28 PROCEDURE — 99153 MOD SED SAME PHYS/QHP EA: CPT

## 2017-07-28 PROCEDURE — 33207 INSERT HEART PM VENTRICULAR: CPT

## 2017-07-28 PROCEDURE — A9270 NON-COVERED ITEM OR SERVICE: HCPCS

## 2017-07-28 PROCEDURE — 700105 HCHG RX REV CODE 258: Performed by: HOSPITALIST

## 2017-07-28 PROCEDURE — 700102 HCHG RX REV CODE 250 W/ 637 OVERRIDE(OP): Performed by: HOSPITALIST

## 2017-07-28 PROCEDURE — G8978 MOBILITY CURRENT STATUS: HCPCS | Mod: CI

## 2017-07-28 PROCEDURE — 700117 HCHG RX CONTRAST REV CODE 255: Performed by: INTERNAL MEDICINE

## 2017-07-28 PROCEDURE — C1898 LEAD, PMKR, OTHER THAN TRANS: HCPCS

## 2017-07-28 PROCEDURE — 84439 ASSAY OF FREE THYROXINE: CPT

## 2017-07-28 PROCEDURE — 99233 SBSQ HOSP IP/OBS HIGH 50: CPT | Performed by: HOSPITALIST

## 2017-07-28 PROCEDURE — 84484 ASSAY OF TROPONIN QUANT: CPT

## 2017-07-28 PROCEDURE — 305387 HCHG SUTURES

## 2017-07-28 RX ORDER — SPIRONOLACTONE 25 MG/1
12.5 TABLET ORAL DAILY
Status: DISCONTINUED | OUTPATIENT
Start: 2017-07-29 | End: 2017-07-28

## 2017-07-28 RX ORDER — MIDAZOLAM HYDROCHLORIDE 1 MG/ML
INJECTION INTRAMUSCULAR; INTRAVENOUS
Status: COMPLETED
Start: 2017-07-28 | End: 2017-07-28

## 2017-07-28 RX ORDER — METOPROLOL SUCCINATE 25 MG/1
25 TABLET, EXTENDED RELEASE ORAL DAILY
Status: DISCONTINUED | OUTPATIENT
Start: 2017-07-29 | End: 2017-07-29 | Stop reason: HOSPADM

## 2017-07-28 RX ORDER — SPIRONOLACTONE 25 MG/1
12.5 TABLET ORAL DAILY
Status: DISCONTINUED | OUTPATIENT
Start: 2017-07-29 | End: 2017-07-29 | Stop reason: HOSPADM

## 2017-07-28 RX ORDER — LIDOCAINE HYDROCHLORIDE 20 MG/ML
INJECTION, SOLUTION INFILTRATION; PERINEURAL
Status: COMPLETED
Start: 2017-07-28 | End: 2017-07-28

## 2017-07-28 RX ORDER — BETAMETHASONE DIPROPIONATE 0.5 MG/G
CREAM TOPICAL NIGHTLY PRN
Status: DISCONTINUED | OUTPATIENT
Start: 2017-07-28 | End: 2017-07-29 | Stop reason: HOSPADM

## 2017-07-28 RX ORDER — LISINOPRIL 5 MG/1
2.5 TABLET ORAL DAILY
Status: DISCONTINUED | OUTPATIENT
Start: 2017-07-29 | End: 2017-07-29 | Stop reason: HOSPADM

## 2017-07-28 RX ORDER — BUPIVACAINE HYDROCHLORIDE 2.5 MG/ML
INJECTION, SOLUTION EPIDURAL; INFILTRATION; INTRACAUDAL
Status: COMPLETED
Start: 2017-07-28 | End: 2017-07-28

## 2017-07-28 RX ORDER — LISINOPRIL 5 MG/1
2.5 TABLET ORAL DAILY
Status: DISCONTINUED | OUTPATIENT
Start: 2017-07-29 | End: 2017-07-28

## 2017-07-28 RX ADMIN — MIDAZOLAM 2 MG: 1 INJECTION INTRAMUSCULAR; INTRAVENOUS at 14:46

## 2017-07-28 RX ADMIN — FENTANYL CITRATE 100 MCG: 50 INJECTION, SOLUTION INTRAMUSCULAR; INTRAVENOUS at 13:54

## 2017-07-28 RX ADMIN — VANCOMYCIN HYDROCHLORIDE 2000 MG: 1 INJECTION, POWDER, LYOPHILIZED, FOR SOLUTION INTRAVENOUS at 12:40

## 2017-07-28 RX ADMIN — GLIMEPIRIDE 2 MG: 2 TABLET ORAL at 09:34

## 2017-07-28 RX ADMIN — SODIUM CHLORIDE: 9 INJECTION, SOLUTION INTRAVENOUS at 09:40

## 2017-07-28 RX ADMIN — BUPIVACAINE HYDROCHLORIDE: 2.5 INJECTION, SOLUTION EPIDURAL; INFILTRATION; INTRACAUDAL; PERINEURAL at 13:32

## 2017-07-28 RX ADMIN — IOHEXOL 55 ML: 350 INJECTION, SOLUTION INTRAVENOUS at 14:46

## 2017-07-28 RX ADMIN — LEVOTHYROXINE SODIUM 100 MCG: 100 TABLET ORAL at 09:34

## 2017-07-28 RX ADMIN — FINASTERIDE 5 MG: 5 TABLET, FILM COATED ORAL at 09:34

## 2017-07-28 RX ADMIN — TAMSULOSIN HYDROCHLORIDE 0.4 MG: 0.4 CAPSULE ORAL at 09:34

## 2017-07-28 RX ADMIN — PNEUMOCOCCAL 13-VALENT CONJUGATE VACCINE 0.5 ML: 2.2; 2.2; 2.2; 2.2; 2.2; 4.4; 2.2; 2.2; 2.2; 2.2; 2.2; 2.2; 2.2 INJECTION, SUSPENSION INTRAMUSCULAR at 06:35

## 2017-07-28 RX ADMIN — LIDOCAINE HYDROCHLORIDE: 20 INJECTION, SOLUTION INFILTRATION; PERINEURAL at 13:32

## 2017-07-28 RX ADMIN — WARFARIN SODIUM 7.5 MG: 7.5 TABLET ORAL at 20:37

## 2017-07-28 RX ADMIN — MIDAZOLAM 2 MG: 1 INJECTION INTRAMUSCULAR; INTRAVENOUS at 13:34

## 2017-07-28 ASSESSMENT — COGNITIVE AND FUNCTIONAL STATUS - GENERAL
STANDING UP FROM CHAIR USING ARMS: A LITTLE
SUGGESTED CMS G CODE MODIFIER DAILY ACTIVITY: CJ
TOILETING: A LITTLE
DRESSING REGULAR LOWER BODY CLOTHING: A LITTLE
MOBILITY SCORE: 20
HELP NEEDED FOR BATHING: A LITTLE
PERSONAL GROOMING: A LITTLE
WALKING IN HOSPITAL ROOM: A LITTLE
DAILY ACTIVITIY SCORE: 20
SUGGESTED CMS G CODE MODIFIER MOBILITY: CJ
CLIMB 3 TO 5 STEPS WITH RAILING: A LOT

## 2017-07-28 ASSESSMENT — ENCOUNTER SYMPTOMS
SHORTNESS OF BREATH: 0
HEADACHES: 0
FEVER: 0
ABDOMINAL PAIN: 0
PALPITATIONS: 0
BLURRED VISION: 0
DIZZINESS: 0
BLOOD IN STOOL: 0

## 2017-07-28 ASSESSMENT — GAIT ASSESSMENTS
ASSISTIVE DEVICE: FRONT WHEEL WALKER
GAIT LEVEL OF ASSIST: SUPERVISED
DISTANCE (FEET): 250

## 2017-07-28 ASSESSMENT — PAIN SCALES - GENERAL
PAINLEVEL_OUTOF10: 0

## 2017-07-28 NOTE — ED NOTES
Med rec complete per pt, pt's son, and a call to [facility] in [facility] reviewed  No ABX in last month

## 2017-07-28 NOTE — HEART FAILURE PROGRAM
"Cardiovascular Nurse Navigator () Progress Note:     This patient has a history of HF. However, per H&P, the HF is chronic and not in exacerbation at this time, pt is dehydrated and receiving fluids. Therefore, a seven day HF f/u appt is not currently indicated.    Should pt develop an iatrogenic exacerbation, he will require a seven day f/u appt which can be achieved by placing a \"schedule heart failure follow up appointment\" order per protocol or by calling the hospital schedulers at [phone].     Thank you and please call with questions or concerns.  "

## 2017-07-28 NOTE — THERAPY
"88 y/o male adm after syncope, hypotension and bradycardia, dehydration.  PMH chronic chf, bladder CA, htn, afib, avr. Pt  presents with shortened step length and altered balance during amb with no AD. Pt amenable to FWW and presented with safer gait pattern, with increased step length and frantz. Intact motor and sensory component BLE. balance improved during amb with FWW. No further acute PT services required at this time. Nursing aware and ambenable re: pt supervision for OOB mobility / amb with fww while in acute setting.    Physical Therapy Evaluation completed.   Bed Mobility:  Supine to Sit: Supervised  Transfers: Sit to Stand: Supervised  Gait: Level Of Assist: Supervised with Front-Wheel Walker       Plan of Care: Patient with no further skilled PT needs in the acute care setting at this time  Discharge Recommendations: Equipment: Front-Wheel Walker. Post-acute therapy Discharge to home with outpatient or home health for additional skilled therapy services.    See \"Rehab Therapy-Acute\" Patient Summary Report for complete documentation.     "

## 2017-07-28 NOTE — PROGRESS NOTES
[facility] Hospitalist Progress Note    Date of Service: 2017    Chief Complaint  89 y.o. male admitted 2017 with Syncope and evidence of a malfunctioning pacemaker  With RV lead malfunction now status post RV lead revision with pacemaker generator change      Interval Problem Update  Having pauses over 3 seconds this morning. Discussed with cardiology immediately and thankfully they assessed a malfunctioning pacemaker the patient went emergently for a successful revision of this today      Consultants/Specialty  Cardiology    Disposition  Guarded but improving he should remain in the hospital for the time being      ROS   Physical Exam  Laboratory/Imaging   Hemodynamics  Temp (24hrs), Av.1 °C (98.8 °F), Min:36.7 °C (98 °F), Max:37.9 °C (100.3 °F)   Temperature: 36.9 °C (98.4 °F)  Pulse  Av  Min: 57  Max: 89 Heart Rate (Monitored): (!) 56  Blood Pressure : 110/62 mmHg, NIBP: 142/60 mmHg      Respiratory      Respiration: 16, Pulse Oximetry: 97 %     Work Of Breathing / Effort: Mild  RUL Breath Sounds: Diminished, RML Breath Sounds: Diminished, RLL Breath Sounds: Diminished, WILY Breath Sounds: Diminished, LLL Breath Sounds: Diminished    Fluids    Intake/Output Summary (Last 24 hours) at 17 0905  Last data filed at 17 0600   Gross per 24 hour   Intake    985 ml   Output    250 ml   Net    735 ml       Nutrition  Orders Placed This Encounter   Procedures   • DIET ORDER     Standing Status: Standing      Number of Occurrences: 1      Standing Expiration Date:      Order Specific Question:  Diet:     Answer:  Regular [1]     Order Specific Question:  Miscellaneous modifications:     Answer:  No Decaf, No Caffeine(for test) [11]      Comments:  Protocol 1313 Patient to have no caffeine for 12 hours prior to exam (decaf, coffee, cola, tea, chocolate)     Physical Exam    Recent Labs      17   1513  17   0251   WBC  7.3  5.2   RBC  4.09*  3.93*   HEMOGLOBIN  13.8*  13.2*   HEMATOCRIT   39.2*  38.4*   MCV  95.8  97.7   MCH  33.7*  33.6*   MCHC  35.2  34.4   RDW  43.7  45.0   PLATELETCT  225  210   MPV  9.1  9.3     Recent Labs      07/27/17   1513  07/28/17   0251   SODIUM  129*  130*   POTASSIUM  4.3  4.2   CHLORIDE  94*  95*   CO2  23  26   GLUCOSE  152*  127*   BUN  34*  39*   CREATININE  1.24  1.33   CALCIUM  9.2  9.1     Recent Labs      07/27/17   1513  07/28/17   0251   APTT  37.3*   --    INR  2.42*  2.47*     Recent Labs      07/27/17   1513   BNPBTYPENAT  334*              Assessment/Plan     Chronic congestive heart failure (CMS-HCC) (present on admission)  Assessment & Plan  - Previous EF 30%  - Repeat echo pending  - Continue metoprolol and spironolactone and lisinopril    A-fib (CMS-HCC) (present on admission)  Assessment & Plan  - Chronic  - Continue metoprolol  - Continue Coumadin    HTN (hypertension) (present on admission)  Assessment & Plan  - continue lisinopril and spironolactone and metoprolol    S/P AVR (aortic valve replacement) (present on admission)  Assessment & Plan  - May now have improved EF, will recheck    Syncope and collapse (present on admission)  Assessment & Plan  - Cardiologist Dr. Larkin consulted  Secondary to pacemaker malfunction. Pacemaker changed out today    Dehydration (present on admission)  Assessment & Plan  - Repleting with gentle IV fluids given cardiomyopathy    Acquired circulating anticoagulants (CMS-HCC) (present on admission)  Assessment & Plan  - Continue Coumadin    Mucous retention cyst (present on admission)  Assessment & Plan  - Seen on CT      Core Measures

## 2017-07-28 NOTE — FACE TO FACE
Face to Face Supporting Documentation - Home Health    The encounter with this patient was in whole or in part the primary reason for home health admission.    Date of encounter:   Patient:                    MRN:                       YOB: 2017  Monster Miramontes  [id number]  2/25/1928     Home health to see patient for:  Skilled Nursing care for assessment, interventions & education    Skilled need for:  Exacerbation of Chronic Disease State hypotension, chf    Skilled nursing interventions to include:  Line/Drain/Airway education and care    Homebound status evidenced by:  Need the aid of supportive devices such as crutches, canes, wheelchairs or walkers. Leaving home requires a considerable and taxing effort. There is a normal inability to leave the home.    Community Physician to provide follow up care: Jose Hayes M.D.     Optional Interventions? No      I certify the face to face encounter for this home health care referral meets the CMS requirements and the encounter/clinical assessment with the patient was, in whole, or in part, for the medical condition(s) listed above, which is the primary reason for home health care. Based on my clinical findings: the service(s) are medically necessary, support the need for home health care, and the homebound criteria are met.  I certify that this patient has had a face to face encounter by myself.  [place] M.D. - NPI: [id number]

## 2017-07-28 NOTE — PROGRESS NOTES
Cardiology Progress Note               Author: Nataly Larkin Date & Time created: 2017  12:31 PM     Interval History:   no acute overnight events underlying rhythm A. fib with pauses of 3.2 seconds.    Telemetry A. fib with heart rate dropping to 40 as pauses of 3.2 second.    Review of Systems   Constitutional: Negative for fever.   Eyes: Negative for blurred vision.   Respiratory: Negative for shortness of breath.    Cardiovascular: Negative for chest pain and palpitations.   Gastrointestinal: Negative for abdominal pain and blood in stool.   Genitourinary: Negative for hematuria.   Musculoskeletal: Positive for joint pain.   Skin: Positive for itching. Negative for rash.   Neurological: Negative for dizziness and headaches.       Physical Exam   Constitutional: He is oriented to person, place, and time. He appears well-developed. No distress.   HENT:   Mouth/Throat: Mucous membranes are normal.   Eyes: Conjunctivae and EOM are normal.   Neck: No JVD (7 cm) present. No tracheal deviation present. No thyroid mass present.   Cardiovascular: Intact distal pulses.  An irregularly irregular rhythm present. Bradycardia present.    Murmur heard.   Systolic murmur is present with a grade of 2/6   Pulses:       Carotid pulses are 2+ on the right side, and 2+ on the left side.       Radial pulses are 2+ on the right side, and 2+ on the left side.   Systolic murmur best in the tricuspid area   Pulmonary/Chest: Effort normal and breath sounds normal. No respiratory distress. He exhibits no tenderness.   Abdominal: Soft. There is no tenderness.   Musculoskeletal: Normal range of motion. He exhibits no edema.   Neurological: He is alert and oriented to person, place, and time. He has normal strength. He displays no tremor.   Skin: Skin is warm and dry. He is not diaphoretic.   Psychiatric: He has a normal mood and affect. His behavior is normal.   Vitals reviewed.      Hemodynamics:  Temp (24hrs), Av °C (98.6  °F), Min:36.4 °C (97.6 °F), Max:37.9 °C (100.3 °F)  Temperature: 36.4 °C (97.6 °F)  Pulse  Av.8  Min: 47  Max: 89Heart Rate (Monitored): (!) 56  Blood Pressure : (!) 90/40 mmHg ([place] and [place] aware), NIBP: 142/60 mmHg     Respiratory:    Respiration: 20, Pulse Oximetry: 94 %     Work Of Breathing / Effort: Mild  RUL Breath Sounds: Diminished, RML Breath Sounds: Diminished, RLL Breath Sounds: Diminished, WILY Breath Sounds: Diminished, LLL Breath Sounds: Diminished  Fluids:  Date 17 07 - 17 0659   Shift 5288-7109 6705-4111 6115-4517 24 Hour Total   I  N  T  A  K  E   Shift Total       O  U  T  P  U  T   Urine 250   250    Shift Total 250   250   Weight (kg) 74.3 74.3 74.3 74.3       Weight: 74.3 kg (163 lb 12.8 oz)  GI/Nutrition:  Orders Placed This Encounter   Procedures   • DIET NPO     Standing Status: Standing      Number of Occurrences: 1      Standing Expiration Date:      Order Specific Question:  Restrict to:     Answer:  Sips with Medications [3]     Lab Results:  Recent Labs      17   025   WBC  7.3  5.2   RBC  4.09*  3.93*   HEMOGLOBIN  13.8*  13.2*   HEMATOCRIT  39.2*  38.4*   MCV  95.8  97.7   MCH  33.7*  33.6*   MCHC  35.2  34.4   RDW  43.7  45.0   PLATELETCT  225  210   MPV  9.1  9.3     Recent Labs      17   025   SODIUM  129*  130*   POTASSIUM  4.3  4.2   CHLORIDE  94*  95*   CO2  23  26   GLUCOSE  152*  127*   BUN  34*  39*   CREATININE  1.24  1.33   CALCIUM  9.2  9.1     Recent Labs      17   025   APTT  37.3*   --    INR  2.42*  2.47*     Recent Labs      17   1513   BNPBTYPENAT  334*     Recent Labs      17   0251   TROPONINI  0.03  0.04  0.05*   BNPBTYPENAT  334*   --    --            TTE: 17  TDS - Body Habitus  Prior Echo - 12;  Moderately reduced left ventricular systolic function.  Left ventricular ejection fraction is visually estimated to be  30%. Global hypokinesis with regional variation. Grade III-IV diastolic dysfunction (restrictive pattern).  Mild mitral regurgitation.  Known bioprosthetic aortic valve with increased gradient. Vmax is 3.1 m/s. Transvalvular gradients are - Peak: 39 mmHg, Mean: 27 mmHg.    Moderate tricuspid regurgitation.  Estimated right ventricular systolic pressure is 25 mmHg    EK17  EKG personally reviewed by me  Atrial fibrillation left axis IVCD versus atypical left bundle branch block and diffuse nonspecific T-wave changes.    Chest Xray: 17  There is no evidence of focal consolidation or evidence of pulmonary edema.  There is no evidence of pleural effusion.  Mild cardiomegaly.  Sternotomy wires. Prosthetic valve.  Stable position left transvenous electrodes    Aortic valve replacement #23    SCOTT: 12  Intra-operative transesophageal echocardiogram performed by anesthesiology.  Pre-op SCOTT shows significant aortic stenosis, mild to moderate eccentric mitral regurgitation, left atrial enlargement, tiny patent foramen ovale with left to right shunting, and depressed LV systolic function.  Post-op SCOTT shows dyskinetic apex, severely depressed LV systolic function, normally functioning bioprosthetic AVR, mild mitral regurgitation, and tiny PFO with left to right shunting    TTE: 12  Left ventricle is mildly dilated. Severely reduced left ventricular systolic function. Left ventricular ejection fraction is 25% to 30%.  Enlarged right ventricular size.  Moderately dilated right atrium.  Severely dilated left atrium. LA Volume index 63 ml/m2.  Thickened mitral valve leaflets. Moderate mitral regurgitation.  Severe aortic stenosis. Trace aortic insufficiency. Peak 63 mmHg, mean 41 mmHg, DI= .16 DMITRIY .7cm2.   Moderate tricuspid regurgitation. Right ventricular systolic pressure is estimated to be 50-55 mmHg consistent with moderate pulmonary hypertension.    Angiogram: 12  1.  Hemodynamics:  Systolic blood  pressure at the beginning of the procedure  was about 92.  2.  Coronary angiography:  a.  Ostial:  30-40% narrowing.  b.  LAD:  Mild luminal irregularities, none greater than 30%.  c.  Left circumflex nondominant, moderate in size with mild luminal  irregularities, none greater than 30-40%.  d.  RCA is moderate to large in size, dominant, and has diffuse mild to moderate luminal irregularities with one eccentric proximal to mid discrete lesion of 60-70%, another longer 60% lesion.  Of note, all the coronary vessels have a great deal of calcification.      EK17  EKG personally reviewed by me  Atrial fibrillation left axis IVCD versus atypical left bundle branch block and diffuse nonspecific T-wave changes.    Chest Xray: 17  There is no evidence of focal consolidation or evidence of pulmonary edema.  There is no evidence of pleural effusion.  Mild cardiomegaly.  Sternotomy wires. Prosthetic valve.  Stable position left transvenous electrodes    Aortic valve replacement #23    SCOTT: 12  Intra-operative transesophageal echocardiogram performed by anesthesiology.  Pre-op SCOTT shows significant aortic stenosis, mild to moderate eccentric mitral regurgitation, left atrial enlargement, tiny patent foramen ovale with left to right shunting, and depressed LV systolic function.  Post-op SCOTT shows dyskinetic apex, severely depressed LV systolic function, normally functioning bioprosthetic AVR, mild mitral regurgitation, and tiny PFO with left to right shunting    TTE: 12  Left ventricle is mildly dilated. Severely reduced left ventricular systolic function. Left ventricular ejection fraction is 25% to 30%.  Enlarged right ventricular size.  Moderately dilated right atrium.  Severely dilated left atrium. LA Volume index 63 ml/m2.  Thickened mitral valve leaflets. Moderate mitral regurgitation.  Severe aortic stenosis. Trace aortic insufficiency. Peak 63 mmHg, mean 41 mmHg, DI= .16 DMITRIY .7cm2.   Moderate  tricuspid regurgitation. Right ventricular systolic pressure is estimated to be 50-55 mmHg consistent with moderate pulmonary hypertension.    Angiogram: 9/21/12  1.  Hemodynamics:  Systolic blood pressure at the beginning of the procedure  was about 92.  2.  Coronary angiography:  a.  Ostial:  30-40% narrowing.  b.  LAD:  Mild luminal irregularities, none greater than 30%.  c.  Left circumflex nondominant, moderate in size with mild luminal  irregularities, none greater than 30-40%.  d.  RCA is moderate to large in size, dominant, and has diffuse mild to moderate luminal irregularities with one eccentric proximal to mid discrete lesion of 60-70%, another longer 60% lesion.  Of note, all the coronary vessels have a great deal of calcification.      Medical Decision Making, by Problem:  Active Hospital Problems    Diagnosis   • Syncope and collapse [R55]   • Dehydration [E86.0]   • Acquired circulating anticoagulants (CMS-HCC) [D68.318]   • Mucous retention cyst [K11.6]   • S/P AVR (aortic valve replacement) [Z95.2]   • Chronic congestive heart failure (CMS-HCC) [I50.9]   • HTN (hypertension) [I10]   • A-fib (CMS-HCC) [I48.91]       Plan:    Patient is a pleasant 89-year-old male with known history of severe aortic stenosis status post aVR in 2012, a nonischemic cardiomyopathy LVEF of 25-30%, nonocclusive CAD angiogram from 9/12 showed left main 30-40% narrowing, LAD luminal irregularities no more than 30%, LCx luminary irregularities about 30-40% narrowing, eccentric proximal RCA lesion about 60-70% another long 60% lesion, persistent atrial fibrillation on Coumadin for anticoagulation, diabetes, hypertension, sinus syndrome status post an Steven pacemaker transferred from [facility] for bradycardia and syncope.    Syncope:  Device interrogation showed fracture lead.   Echocardiogram also showed LVEF of 30% patient will be going for PPM + ICD vs BIV ICD.  Due to pauses beta blockers were held overnight.    Due to  hypotension on antihypertensive agents were held.    Cardiomyopathy nonischemic LVEF 30%:  After pacemaker based on blood pressure beta blockers ace inhibitors will be restarted.    Atrial fibrillation:  Currently rate controlled patient on Coumadin for anticoagulation.    NPO now.    Thank you for allowing me to participate in taking care of patient.    Nataly Larkin. MD    [employer]

## 2017-07-28 NOTE — ASSESSMENT & PLAN NOTE
- Cardiologist Dr. Larkin consulted  Secondary to pacemaker malfunction. Pacemaker changed out today

## 2017-07-28 NOTE — PROGRESS NOTES
2 RN skin assessment performed by NIKO Castellanos and NIKO Gaytan. Ears pink, blanching bilat. BULE bruising, fragile, intact. Midline chest scar. Scattered closed red spots on BLE and back. Sacrum red, blanching. 2 healing, closed skin tears on bilat buttocks. Heels pink, blanching. No other skin breakdown present on admission.

## 2017-07-28 NOTE — THERAPY
"Occupational Therapy Evaluation completed.   Functional Status: Pt seen today for OT eval. Pt is SBA for functional mobility. Attempted to use FWW, however pt just carries it up in the air or leaves it behind. Pt able to demo grooming in stance, LB dressing, and toileting with SBA. Pt very impulsive. Per RN, pt lives at assisted living and has a cane that he sometimes uses.   Plan of Care: Will not actively follow while in house as pt appears to be near baseline, however will remain available to assist with any d/c planning needs at request of CM/MD.  Discharge Recommendations:  Equipment: No Equipment Needed. Post-acute therapy Discharge to home with outpatient or home health for additional skilled therapy services.    See \"Rehab Therapy-Acute\" Patient Summary Report for complete documentation.    "

## 2017-07-28 NOTE — OR SURGEON
Immediate Post-Operative Note    PreOp Diagnosis: RV lead fracture    PostOp Diagnosis: RV lead fracture    Procedure(s) :  RV lead revision, pacemaker generator change, CS lead placement    Surgeon(s):  Ewa Davis M.D.    Type of Anesthesia: Moderate Sedation    Specimen: None    Estimated Blood Loss: 50 cc's    Fluoro Time: 26.2 min    Findings:   1)Successful RV lead revision  2)Successful upgrade to CRT-P    Complications: None    Full report to follow    Ewa Davis M.D.  7/28/2017 2:53 PM

## 2017-07-28 NOTE — PROGRESS NOTES
Inpatient Anticoagulation Service Note    Date: 7/27/2017  Reason for Anticoagulation: Atrial Fibrillation   [id number] VASc Score: 7  Hemoglobin Value: 13.8  Hematocrit Value: 39.2  Lab Platelet Value: 225  Target INR: 2.0 to 3.0    INR from last 7 days     Date/Time INR Value    07/27/17 1513 (!)2.42        Dose from last 7 days     Date/Time Dose (mg)    07/27/17 1900 5 (TOOK AT HOME)        Average Dose (mg):  (Home dose: Warfarin 7.5 mg MWF, 5 mg AOD)  Significant Interactions: Thyroid Medications  Bridge Therapy: No     Comments: Patient therapeutic on admit. No new DDI. Obs status. Continue home dose.     Education Material Provided?: No (Chronic warfarin patient)  Pharmacist suggested discharge dosing: back on home dose of Warfarin 7.5 mg on Mon-Wed-Fri and 5 mg all other days of week     David Michaud, PharmD, BCPS

## 2017-07-28 NOTE — CARE PLAN
Problem: Safety  Goal: Will remain free from injury  Outcome: PROGRESSING AS EXPECTED  Katja Gaitan Fall Risk Assessment:     Last Known Fall: During the current hospitalization  Mobility: Dizziness/generalized weakness  Medications: Cardiovascular or central nervous system meds  Mental Status/LOC/Awareness: Awake, alert, and oriented to date, place, and person  Toileting Needs: No needs  Volume/Electrolyte Status: Use of IV fluids/tube feeds  Communication/Sensory: Visual (Glasses)/hearing deficit  Behavior: Behavioral noncompliance with instruction  Katja Gaitan Fall Risk Total: 14  Fall Risk Level: MODERATE RISK    Universal Fall Precautions:  call light/belongings in reach, bed in low position and locked, wheelchairs and assistive devices out of sight, siderails up x 2, use non-slip footwear, adequate lighting, clutter free and spill free environment, educate on level of risk, educate to call for assistance    Fall Risk Level Interventions:    TRIAL (TELE 8, NEURO, MED MATIAS 5) Moderate Fall Risk Interventions  Place yellow fall risk ID band on patient: completed  Provide patient/family education based on risk assessment : completed  Educate patient/family to call staff for assistance when getting out of bed: completed  Place fall precaution signage outside patient door: completed  Utilize bed/chair fall alarm: completed     Patient Specific Interventions:     Medication: review medications with patient and family, assess for medications that can be discontinued or dosage decreased, limit combination of prn medications, provide patients who received diuretics/laxatives frequent toileting and assess need for orthostatic hypotension evaluation  Mental Status/LOC/Awareness: reinforce falls education and encourage family to stay with patient  Toileting: provide frquent toileting, monitor intake and output/use of appropriate interventions, instruct male patients prone to dizziness to void while sitting and instruct  patient/family on the use of grab bars  Volume/Electrolyte Status: ensure patient remains hydrated, monitor blood sugars and maintain appropriate blood sugar levels if diabetic, monitor abnormal lab values, ensure IV fluids are appropriate and teach patients to dangle before rising if hypotensive  Communication/Sensory: update plan of care on whiteboard and ensure patient has glasses/contacts and hearing aids/dentures  Behavioral: encourage patient to voice feelings, engage patient in daily activities and administer medication as ordered  Mobility: schedule physical activity throughout the day, provide comfort measures during transport, dangle prior to standing, utilize bed/chair fall alarm, ensure bed is locked and in lowest position, provide appropriate assistive device and instruct patient to exit bed on their strongest side    Problem: Infection  Goal: Will remain free from infection  Outcome: PROGRESSING AS EXPECTED  Remains free from signs and symptoms of infection. Standard precautions implemented. Hand hygiene before and after contact with pt. Educated about importance of hand hygiene.

## 2017-07-28 NOTE — PROGRESS NOTES
Pt arrived via hospital escort. Assisted to bed 2X assist. Bed in lowest position, locked, and alarm activated. Call light within reach. Plan of care discussed with pt. Verbalizes understanding. A-fib 50's on monitor.

## 2017-07-28 NOTE — PROGRESS NOTES
Pt had multiple pauses/arrests ranging from 2.3 sec to 3.4 sec.  Pt asymptomatic, Dr. East and Dr. Larkin notified.  Pacemaker to be interrogated, further orders pending results

## 2017-07-28 NOTE — CARE PLAN
Problem: Safety  Goal: Will remain free from injury  Intervention: Educate patient and significant other/support system about adaptive mobility strategies and safe transfers    Safety precautions reviewed with pt, pt verbalized understanding and denies questions.  Pt demonstrated ability to use call light for assistance.      Problem: Knowledge Deficit  Goal: Knowledge of disease process/condition, treatment plan, diagnostic tests, and medications will improve  Intervention: Explain information regarding disease process/condition, treatment plan, diagnostic tests, and medications and document in education  Medications and indications reviewed with pt, pt verbalized understanding and all questions answered.

## 2017-07-28 NOTE — H&P
Hospital Medicine History and Physical      Date of Service  7/27/2017    Chief Complaint  Chief Complaint   Patient presents with   • Syncope       History of Presenting Illness  Fe is a 89 y.o. male w/h/o bladder cancer, hypertension, A. fib, hypothyroidism who presents with syncope and hypotension. Patient was driving to lunch and then after getting out of his car and beginning to walk experienced a syncopal event. Per patient he did not have any prodrome. He was feeling totally fine before and after the incident. He was reported to have bradycardia at the outside ER with heart rate in the 30s and was thus transferred over to [facility].    Primary Care Physician  Jose Hayes M.D.    Outpatient cards  In [facility]    Code Status  Full code per patient    Review of Systems  Review of Systems   Constitutional: Negative for fever and chills.   Respiratory: Negative for cough, shortness of breath and wheezing.    Cardiovascular: Negative for chest pain.   Gastrointestinal: Negative for nausea, vomiting, diarrhea and constipation.   Neurological: Negative for headaches.     Please see HPI, all other systems were reviewed and are negative (AMA/CMS criteria)     Past Medical History  Past Medical History   Diagnosis Date   • Unspecified hemorrhagic conditions      takes warfarin   • Cancer (CMS-HCC) 2007     bladder   • Diabetes      oral medication   • Hypertension    • Congestive heart failure (CMS-HCC)    • Arrhythmia    • Pacemaker    • Atrial fibrillation (CMS-HCC)    • Breath shortness    • Snoring    • Stroke (CMS-HCC) 2010   • CATARACT      removed   • Heart murmur    • Unspecified disorder of thyroid      hypothyroid       Surgical History  Past Surgical History   Procedure Laterality Date   • Bladder biopsy with cystoscopy  7/3/08     Performed by ANDRESSA HANSON at SURGERY [facility] ORS   • Trans urethral resection bladder  7/3/08     Performed by ANDRESSA HANSON at [facility]  ORS   • Pacemaker insertion     • Recovery  2012     Performed by Cath-Recovery Surgery at SURGERY SAME DAY [facility] ORS   • Aortic valve replacement  2012     Performed by Cong Marinelli M.D. at SURGERY [facility] ORS       Medications  No current facility-administered medications on file prior to encounter.     Current Outpatient Prescriptions on File Prior to Encounter   Medication Sig Dispense Refill   • metoprolol SR (TOPROL XL) 25 MG TABLET SR 24 HR Take 1 Tab by mouth every day. 90 Tab 3   • betamethasone dipropionate 0.05 % lotion Apply 1 Application to affected area(s) every bedtime.  0   • lisinopril (PRINIVIL) 5 MG Tab Take 1 Tab by mouth every day. 30 Tab 5   • finasteride (PROSCAR) 5 MG TABS Take 5 mg by mouth every day.     • spironolactone (ALDACTONE) 25 MG TABS Take 25 mg by mouth every day.       • glimepiride (AMARYL) 2 MG TABS Take 2 mg by mouth every morning.     • levothyroxine (SYNTHROID) 100 MCG TABS Take 100 mcg by mouth every day.     • tamsulosin (FLOMAX) 0.4 MG capsule Take 0.4 mg by mouth ONE-HALF HOUR AFTER BREAKFAST.     • warfarin (COUMADIN) 5 MG TABS Take 5-7.5 mg by mouth every day. 7.5mg on Monday/Wednesday/Friday, 5mg on all other days       Family History  History reviewed. No pertinent family history.  Patient does not know what his parents had    Social History  Social History   Substance Use Topics   • Smoking status: Former Smoker -- 2.00 packs/day for 28 years     Types: Cigarettes     Quit date: 1972   • Smokeless tobacco: Current User     Types: Chew   • Alcohol Use: Yes      Comment: 3 drinks per day       Allergies  Allergies   Allergen Reactions   • Clonidine Rash              Physical Exam  Laboratory   Hemodynamics  Temp (24hrs), Av.8 °C (98.3 °F), Min:36.7 °C (98 °F), Max:36.9 °C (98.5 °F)   Temperature: 36.9 °C (98.5 °F)  Pulse  Av.2  Min: 57  Max: 89 Heart Rate (Monitored): (!) 56  Blood Pressure : 114/69 mmHg, NIBP: 142/60 mmHg       Respiratory      Respiration: 16, Pulse Oximetry: 92 %             Physical Exam   Constitutional: He appears well-developed.   Thin/frail   HENT:   Head: Normocephalic.   Very hard of hearing   Eyes: Conjunctivae are normal.   Cardiovascular: Normal rate.  An irregularly irregular rhythm present.   Pulmonary/Chest: No respiratory distress. He has no wheezes. He has no rales.   Pacemaker present on left chest   Abdominal: Soft. He exhibits no distension.   Musculoskeletal: He exhibits no edema.   Skin: Skin is warm. No erythema.   Psychiatric: He has a normal mood and affect.       Recent Labs      07/27/17   1513   WBC  7.3   RBC  4.09*   HEMOGLOBIN  13.8*   HEMATOCRIT  39.2*   MCV  95.8   MCH  33.7*   MCHC  35.2   RDW  43.7   PLATELETCT  225   MPV  9.1     Recent Labs      07/27/17   1513   SODIUM  129*   POTASSIUM  4.3   CHLORIDE  94*   CO2  23   GLUCOSE  152*   BUN  34*   CREATININE  1.24   CALCIUM  9.2     Recent Labs      07/27/17   1513   ALTSGPT  10   ASTSGOT  20   ALKPHOSPHAT  98   TBILIRUBIN  1.2   LIPASE  10*   GLUCOSE  152*     Recent Labs      07/27/17   1513   APTT  37.3*   INR  2.42*     Recent Labs      07/27/17   1513   BNPBTYPENAT  334*         Lab Results   Component Value Date    TROPONINI 0.04 07/27/2017       Imaging  CT-HEAD W/O   Final Result      1.  Cerebral atrophy.      2.  White matter lucencies most consistent with small vessel ischemic change versus demyelination or gliosis.      3.  Otherwise, Head CT without contrast with no acute findings. No evidence of acute cerebral  hemorrhage or mass lesion.   4. Large lobulated mass within the left maxillary sinus consistent with mucous retention cyst/inflammatory change. The visualized adjacent walls do not appear eroded.      DX-CHEST-PORTABLE (1 VIEW)   Final Result      No consolidation.      ECHOCARDIOGRAM COMP W/O CONT    (Results Pending)   Carotid Duplex ([facility] Only)    (Results Pending)     EKG  per my  independant read:  QTc: 467, HR: 55, A. fib with some beats paced, no ST/T changes otherwise     Assessment/Plan     I anticipate this patient is appropriate for observation status at this time.    Chronic congestive heart failure (CMS-HCC) (present on admission)  Assessment & Plan  - Previous EF 30%  - Repeat echo pending  - Continue metoprolol and spironolactone and lisinopril    A-fib (CMS-HCC) (present on admission)  Assessment & Plan  - Chronic  - Continue metoprolol  - Continue Coumadin    HTN (hypertension) (present on admission)  Assessment & Plan  - continue lisinopril and spironolactone and metoprolol    S/P AVR (aortic valve replacement) (present on admission)  Assessment & Plan  - May now have improved EF, will recheck    Syncope and collapse (present on admission)  Assessment & Plan  - Cardiologist Dr. Larkin consulted  - Possibly due to dehydration  - Will interrogate pacemaker  - Monitor on telemetry  - Echo and carotid ultrasound pending  - Fell and hit head on Coumadin. CT head unremarkable except for mucus retention cyst    Dehydration (present on admission)  Assessment & Plan  - Repleting with gentle IV fluids given cardiomyopathy    Acquired circulating anticoagulants (CMS-HCC) (present on admission)  Assessment & Plan  - Continue Coumadin    Mucous retention cyst (present on admission)  Assessment & Plan  - Seen on CT        Prophylaxis:  warfarin

## 2017-07-29 ENCOUNTER — APPOINTMENT (OUTPATIENT)
Dept: RADIOLOGY | Facility: MEDICAL CENTER | Age: 82
DRG: 245 | End: 2017-07-29
Attending: INTERNAL MEDICINE
Payer: COMMERCIAL

## 2017-07-29 VITALS
DIASTOLIC BLOOD PRESSURE: 61 MMHG | HEIGHT: 74 IN | TEMPERATURE: 97.8 F | BODY MASS INDEX: 21.02 KG/M2 | RESPIRATION RATE: 18 BRPM | SYSTOLIC BLOOD PRESSURE: 106 MMHG | OXYGEN SATURATION: 92 % | HEART RATE: 69 BPM | WEIGHT: 163.8 LBS

## 2017-07-29 LAB
EKG IMPRESSION: NORMAL
INR PPP: 2.96 (ref 0.87–1.13)
PROTHROMBIN TIME: 31.7 SEC (ref 12–14.6)

## 2017-07-29 PROCEDURE — 93010 ELECTROCARDIOGRAM REPORT: CPT | Performed by: INTERNAL MEDICINE

## 2017-07-29 PROCEDURE — 700102 HCHG RX REV CODE 250 W/ 637 OVERRIDE(OP): Performed by: HOSPITALIST

## 2017-07-29 PROCEDURE — 93005 ELECTROCARDIOGRAM TRACING: CPT | Performed by: INTERNAL MEDICINE

## 2017-07-29 PROCEDURE — 85610 PROTHROMBIN TIME: CPT

## 2017-07-29 PROCEDURE — A9270 NON-COVERED ITEM OR SERVICE: HCPCS | Performed by: HOSPITALIST

## 2017-07-29 PROCEDURE — 36415 COLL VENOUS BLD VENIPUNCTURE: CPT

## 2017-07-29 PROCEDURE — 99239 HOSP IP/OBS DSCHRG MGMT >30: CPT | Performed by: HOSPITALIST

## 2017-07-29 RX ORDER — TAMSULOSIN HYDROCHLORIDE 0.4 MG/1
0.8 CAPSULE ORAL
Status: DISCONTINUED | OUTPATIENT
Start: 2017-07-29 | End: 2017-07-29 | Stop reason: HOSPADM

## 2017-07-29 RX ADMIN — GLIMEPIRIDE 2 MG: 2 TABLET ORAL at 10:04

## 2017-07-29 RX ADMIN — METOPROLOL SUCCINATE 25 MG: 25 TABLET, EXTENDED RELEASE ORAL at 10:03

## 2017-07-29 RX ADMIN — TAMSULOSIN HYDROCHLORIDE 0.8 MG: 0.4 CAPSULE ORAL at 10:03

## 2017-07-29 RX ADMIN — FINASTERIDE 5 MG: 5 TABLET, FILM COATED ORAL at 10:03

## 2017-07-29 RX ADMIN — LEVOTHYROXINE SODIUM 100 MCG: 100 TABLET ORAL at 10:03

## 2017-07-29 ASSESSMENT — ENCOUNTER SYMPTOMS
BLURRED VISION: 0
BLOOD IN STOOL: 0
ABDOMINAL PAIN: 0
SHORTNESS OF BREATH: 0
HEADACHES: 0
FEVER: 0
PALPITATIONS: 0
DIZZINESS: 0

## 2017-07-29 ASSESSMENT — PAIN SCALES - GENERAL
PAINLEVEL_OUTOF10: 0

## 2017-07-29 NOTE — DISCHARGE SUMMARY
CHIEF COMPLAINT ON ADMISSION  Chief Complaint   Patient presents with   • Syncope       CODE STATUS  Full Code    HPI & HOSPITAL COURSE  This is a 89 y.o. male here with *a symcopal episode who was found to have a malfuncitoning pacer. This was replaced and he will be discharged home and follow up wiht PCP and cardiology .      Therefore, he is discharged in good and stable condition with close outpatient follow-up.    SPECIFIC OUTPATIENT FOLLOW-UP  Cardiology as scheduled    DISCHARGE PROBLEM LIST  Active Problems:    Chronic congestive heart failure (CMS-HCC) POA: Yes    A-fib (CMS-HCC) POA: Yes    HTN (hypertension) POA: Yes    S/P AVR (aortic valve replacement) POA: Yes    Syncope and collapse POA: Yes    Dehydration POA: Yes    Acquired circulating anticoagulants (CMS-HCC) POA: Yes    Mucous retention cyst POA: Yes  Resolved Problems:    * No resolved hospital problems. *      FOLLOW UP  Future Appointments  Date Time Provider Department Center   8/4/2017 3:15 PM PACER CHECK-[facility] B 2 [facility] None   8/9/2017 8:40 AM Junior Shelley M.D. [facility] None     Jose Hayes M.D.  [address] [facility] [address]  [phone]      As needed      MEDICATIONS ON DISCHARGE   [facility]   Home Medication Instructions CHET:[id number]    Printed on:07/29/17 1043   Medication Information                      betamethasone dipropionate 0.05 % lotion  Apply 1 Application to affected area(s) every bedtime.             finasteride (PROSCAR) 5 MG TABS  Take 5 mg by mouth every day.             furosemide (LASIX) 40 MG Tab  Take 80 mg by mouth every day.             glimepiride (AMARYL) 2 MG TABS  Take 2 mg by mouth every morning.             levothyroxine (SYNTHROID) 100 MCG TABS  Take 100 mcg by mouth every day.             lisinopril (PRINIVIL) 5 MG Tab  Take 1 Tab by mouth every day.             metoprolol SR (TOPROL XL) 25 MG TABLET SR 24 HR  Take 1 Tab by mouth every day.             spironolactone (ALDACTONE) 25 MG  TABS  Take 25 mg by mouth every day.               tamsulosin (FLOMAX) 0.4 MG capsule  Take 0.4 mg by mouth ONE-HALF HOUR AFTER BREAKFAST.             warfarin (COUMADIN) 5 MG TABS  Take 5-7.5 mg by mouth every day. 7.5mg on Monday/Wednesday/Friday, 5mg on all other days                 DIET  Orders Placed This Encounter   Procedures   • Diet Order     Standing Status: Standing      Number of Occurrences: 1      Standing Expiration Date:      Order Specific Question:  Diet:     Answer:  Cardiac [6]       ACTIVITY  As tolerated.  Weight bearing as tolerated      CONSULTATIONS  cardiology- dr rossi    PROCEDURES  RV lead revision, pacemaker generator change, CS lead placement with dr connelly 7/28/215    LABORATORY  Lab Results   Component Value Date/Time    SODIUM 130* 07/28/2017 02:51 AM    POTASSIUM 4.2 07/28/2017 02:51 AM    CHLORIDE 95* 07/28/2017 02:51 AM    CO2 26 07/28/2017 02:51 AM    GLUCOSE 127* 07/28/2017 02:51 AM    BUN 39* 07/28/2017 02:51 AM    CREATININE 1.33 07/28/2017 02:51 AM        Lab Results   Component Value Date/Time    WBC 5.2 07/28/2017 02:51 AM    HEMOGLOBIN 13.2* 07/28/2017 02:51 AM    HEMATOCRIT 38.4* 07/28/2017 02:51 AM    PLATELET COUNT 210 07/28/2017 02:51 AM        Total time of the discharge process exceeds 36 minutes

## 2017-07-29 NOTE — CARE PLAN
Problem: Communication  Goal: The ability to communicate needs accurately and effectively will improve  Outcome: PROGRESSING SLOWER THAN EXPECTED  Patient is very hard of hearing even with hearing aids.    Problem: Safety  Goal: Will remain free from falls  Outcome: PROGRESSING SLOWER THAN EXPECTED  Bed alarm in use, call light in reach; patient is impulsive and does not call for assist.

## 2017-07-29 NOTE — DISCHARGE INSTRUCTIONS
Discharge Instructions    Discharged to home by car with relative. Discharged via wheelchair, hospital escort: Yes.  Special equipment needed: Not Applicable    Be sure to schedule a follow-up appointment with your primary care doctor or any specialists as instructed.     Discharge Plan:   Diet Plan: Discussed  Activity Level: Discussed  Confirmed Follow up Appointment: Appointment Scheduled  Confirmed Symptoms Management: Discussed  Medication Reconciliation Updated: Yes  Pneumococcal Vaccine Given - only chart on this line when given: Given (See MAR)  Influenza Vaccine Indication: Indicated: Not available from distributor/    I understand that a diet low in cholesterol, fat, and sodium is recommended for good health. Unless I have been given specific instructions below for another diet, I accept this instruction as my diet prescription.   Other diet: Cardiac, Diabetic    Special Instructions: None    · Is patient discharged on Warfarin / Coumadin?   Yes    You are receiving the drug warfarin. Please understand the importance of monitoring warfarin with scheduled PT/INR blood draws.  Follow-up with a call to your personal Doctor's office in 3 days to schedule a PT/INR. .    IMPORTANT: HOW TO USE THIS INFORMATION:  This is a summary and does NOT have all possible information about this product. This information does not assure that this product is safe, effective, or appropriate for you. This information is not individual medical advice and does not substitute for the advice of your health care professional. Always ask your health care professional for complete information about this product and your specific health needs.      WARFARIN - ORAL (WARF-uh-rin)      COMMON BRAND NAME(S): Coumadin      WARNING:  Warfarin can cause very serious (possibly fatal) bleeding. This is more likely to occur when you first start taking this medication or if you take too much warfarin. To decrease your risk for  "bleeding, your doctor or other health care provider will monitor you closely and check your lab results (INR test) to make sure you are not taking too much warfarin. Keep all medical and laboratory appointments. Tell your doctor right away if you notice any signs of serious bleeding. See also Side Effects section.      USES:  This medication is used to treat blood clots (such as in deep vein thrombosis-DVT or pulmonary embolus-PE) and/or to prevent new clots from forming in your body. Preventing harmful blood clots helps to reduce the risk of a stroke or heart attack. Conditions that increase your risk of developing blood clots include a certain type of irregular heart rhythm (atrial fibrillation), heart valve replacement, recent heart attack, and certain surgeries (such as hip/knee replacement). Warfarin is commonly called a \"blood thinner,\" but the more correct term is \"anticoagulant.\" It helps to keep blood flowing smoothly in your body by decreasing the amount of certain substances (clotting proteins) in your blood.      HOW TO USE:  Read the Medication Guide provided by your pharmacist before you start taking warfarin and each time you get a refill. If you have any questions, ask your doctor or pharmacist. Take this medication by mouth with or without food as directed by your doctor or other health care professional, usually once a day. It is very important to take it exactly as directed. Do not increase the dose, take it more frequently, or stop using it unless directed by your doctor. Dosage is based on your medical condition, laboratory tests (such as INR), and response to treatment. Your doctor or other health care provider will monitor you closely while you are taking this medication to determine the right dose for you. Use this medication regularly to get the most benefit from it. To help you remember, take it at the same time each day. It is important to eat a balanced, consistent diet while taking " warfarin. Some foods can affect how warfarin works in your body and may affect your treatment and dose. Avoid sudden large increases or decreases in your intake of foods high in vitamin K (such as broccoli, cauliflower, cabbage, brussels sprouts, kale, spinach, and other green leafy vegetables, liver, green tea, certain vitamin supplements). If you are trying to lose weight, check with your doctor before you try to go on a diet. Cranberry products may also affect how your warfarin works. Limit the amount of cranberry juice (16 ounces/480 milliliters a day) or other cranberry products you may drink or eat.      SIDE EFFECTS:  Nausea, loss of appetite, or stomach/abdominal pain may occur. If any of these effects persist or worsen, tell your doctor or pharmacist promptly. Remember that your doctor has prescribed this medication because he or she has judged that the benefit to you is greater than the risk of side effects. Many people using this medication do not have serious side effects. This medication can cause serious bleeding if it affects your blood clotting proteins too much (shown by unusually high INR lab results). Even if your doctor stops your medication, this risk of bleeding can continue for up to a week. Tell your doctor right away if you have any signs of serious bleeding, including: unusual pain/swelling/discomfort, unusual/easy bruising, prolonged bleeding from cuts or gums, persistent/frequent nosebleeds, unusually heavy/prolonged menstrual flow, pink/dark urine, coughing up blood, vomit that is bloody or looks like coffee grounds, severe headache, dizziness/fainting, unusual or persistent tiredness/weakness, bloody/black/tarry stools, chest pain, shortness of breath, difficulty swallowing. Tell your doctor right away if any of these unlikely but serious side effects occur: persistent nausea/vomiting, severe stomach/abdominal pain, yellowing eyes/skin. This drug rarely has caused very serious (possibly  fatal) problems if its effects lead to small blood clots (usually at the beginning of treatment). This can lead to severe skin/tissue damage that may require surgery or amputation if left untreated. Patients with certain blood conditions (protein C or S deficiency) may be at greater risk. Get medical help right away if any of these rare but serious side effects occur: painful/red/purplish patches on the skin (such as on the toe, breast, abdomen), change in the amount of urine, vision changes, confusion, slurred speech, weakness on one side of the body. A very serious allergic reaction to this drug is rare. However, get medical help right away if you notice any symptoms of a serious allergic reaction, including: rash, itching/swelling (especially of the face/tongue/throat), severe dizziness, trouble breathing. This is not a complete list of possible side effects. If you notice other effects not listed above, contact your doctor or pharmacist. In the [place] - Call your doctor for medical advice about side effects. You may report side effects to FDA at 3-632-OSI-0253. In [place] - Call your doctor for medical advice about side effects. You may report side effects to [facility] [place] at [phone].      PRECAUTIONS:  Before taking warfarin, tell your doctor or pharmacist if you are allergic to it; or if you have any other allergies. This product may contain inactive ingredients, which can cause allergic reactions or other problems. Talk to your pharmacist for more details. Before using this medication, tell your doctor or pharmacist your medical history, especially of: blood disorders (such as anemia, hemophilia), bleeding problems (such as bleeding of the stomach/intestines, bleeding in the brain), blood vessel disorders (such as aneurysms), recent major injury/surgery, liver disease, alcohol use, mental/mood disorders (including memory problems), frequent falls/injuries. It is important that all your doctors and dentists  know that you take warfarin. Before having surgery or any medical/dental procedures, tell your doctor or dentist that you are taking this medication and about all the products you use (including prescription drugs, nonprescription drugs, and herbal products). Avoid getting injections into the muscles. If you must have an injection into a muscle (for example, a flu shot), it should be given in the arm. This way, it will be easier to check for bleeding and/or apply pressure bandages. This medication may cause stomach bleeding. Daily use of alcohol while using this medicine will increase your risk for stomach bleeding and may also affect how this medication works. Limit or avoid alcoholic beverages. If you have not been eating well, if you have an illness or infection that causes fever, vomiting, or diarrhea for more than 2 days, or if you start using any antibiotic medications, contact your doctor or pharmacist immediately because these conditions can affect how warfarin works. This medication can cause heavy bleeding. To lower the chance of getting cut, bruised, or injured, use great caution with sharp objects like safety razors and nail cutters. Use an electric razor when shaving and a soft toothbrush when brushing your teeth. Avoid activities such as contact sports. If you fall or injure yourself, especially if you hit your head, call your doctor immediately. Your doctor may need to check you. The Food & Drug Administration has stated that generic warfarin products are interchangeable. However, consult your doctor or pharmacist before switching warfarin products. Be careful not to take more than one medication that contains warfarin unless specifically directed by the doctor or health care provider who is monitoring your warfarin treatment. Older adults may be at greater risk for bleeding while using this drug. This medication is not recommended for use during pregnancy because of serious (possibly fatal) harm to  "an unborn baby. Discuss the use of reliable forms of birth control with your doctor. If you become pregnant or think you may be pregnant, tell your doctor immediately. If you are planning pregnancy, discuss a plan for managing your condition with your doctor before you become pregnant. Your doctor may switch the type of medication you use during pregnancy. Very small amounts of this medication may pass into breast milk but is unlikely to harm a nursing infant. Consult your doctor before breast-feeding.      DRUG INTERACTIONS:  Drug interactions may change how your medications work or increase your risk for serious side effects. This document does not contain all possible drug interactions. Keep a list of all the products you use (including prescription/nonprescription drugs and herbal products) and share it with your doctor and pharmacist. Do not start, stop, or change the dosage of any medicines without your doctor's approval. Warfarin interacts with many prescription, nonprescription, vitamin, and herbal products. This includes medications that are applied to the skin or inside the vagina or rectum. The interactions with warfarin usually result in an increase or decrease in the \"blood-thinning\" (anticoagulant) effect. Your doctor or other health care professional should closely monitor you to prevent serious bleeding or clotting problems. While taking warfarin, it is very important to tell your doctor or pharmacist of any changes in medications, vitamins, or herbal products that you are taking. Some products that may interact with this drug include: capecitabine, imatinib, mifepristone. Aspirin, aspirin-like drugs (salicylates), and nonsteroidal anti-inflammatory drugs (NSAIDs such as ibuprofen, naproxen, celecoxib) may have effects similar to warfarin. These drugs may increase the risk of bleeding problems if taken during treatment with warfarin. Carefully check all prescription/nonprescription product labels " (including drugs applied to the skin such as pain-relieving creams) since the products may contain NSAIDs or salicylates. Talk to your doctor about using a different medication (such as acetaminophen) to treat pain/fever. Low-dose aspirin and related drugs (such as clopidogrel, ticlopidine) should be continued if prescribed by your doctor for specific medical reasons such as heart attack or stroke prevention. Consult your doctor or pharmacist for more details. Many herbal products interact with warfarin. Tell your doctor before taking any herbal products, especially bromelains, coenzyme Q10, cranberry, danshen, dong quai, fenugreek, garlic, ginkgo biloba, ginseng, and Sunitha's wort, among others. This medication may interfere with a certain laboratory test to measure theophylline levels, possibly causing false test results. Make sure laboratory personnel and all your doctors know you use this drug.      OVERDOSE:  If overdose is suspected, contact a poison control center or emergency room immediately.  residents can call the [place] [employer] [facility] Poison Hotline at 1-647.431.1613. [place] residents can call a provincial poison control center. Symptoms of overdose may include: bloody/black/tarry stools, pink/dark urine, unusual/prolonged bleeding.      NOTES:  Do not share this medication with others. Laboratory and/or medical tests (such as INR, complete blood count) must be performed periodically to monitor your progress or check for side effects. Consult your doctor for more details.      MISSED DOSE:  For the best possible benefit, do not miss any doses. If you do miss a dose and remember on the same day, take it as soon as you remember. If you remember on the next day, skip the missed dose and resume your usual dosing schedule. Do not double the dose to catch up because this could increase your risk for bleeding. Keep a record of missed doses to give to your doctor or pharmacist. Contact your doctor or pharmacist if you  miss 2 or more doses in a row.      STORAGE:  Store at room temperature away from light and moisture. Do not store in the bathroom. Keep all medications away from children and pets. Do not flush medications down the toilet or pour them into a drain unless instructed to do so. Properly discard this product when it is  or no longer needed. Consult your pharmacist or local waste disposal company for more details about how to safely discard your product.      MEDICAL ALERT:  Your condition and medication can cause complications in a medical emergency. For information about enrolling in MedicAlert, call 1-490.249.7748 ([place]) or 1-129.820.4926 ([place]).      Information last revised 2010 Copyright(c)  [employer]             · Is patient Post Blood Transfusion?  No    Depression / Suicide Risk    As you are discharged from this [facility] facility, it is important to learn how to keep safe from harming yourself.    Recognize the warning signs:  · Abrupt changes in personality, positive or negative- including increase in energy   · Giving away possessions  · Change in eating patterns- significant weight changes-  positive or negative  · Change in sleeping patterns- unable to sleep or sleeping all the time   · Unwillingness or inability to communicate  · Depression  · Unusual sadness, discouragement and loneliness  · Talk of wanting to die  · Neglect of personal appearance   · Rebelliousness- reckless behavior  · Withdrawal from people/activities they love  · Confusion- inability to concentrate     If you or a loved one observes any of these behaviors or has concerns about self-harm, here's what you can do:  · Talk about it- your feelings and reasons for harming yourself  · Remove any means that you might use to hurt yourself (examples: pills, rope, extension cords, firearm)  · Get professional help from the community (Mental Health, Substance Abuse, psychological counseling)  · Do not be  alone:Call your Safe Contact- someone whom you trust who will be there for you.  · Call your local CRISIS HOTLINE 866-9077 or 557-747-3329  · Call your local Children's Mobile Crisis Response Team [place] (570) 039-1385 or www.[employer]  · Call the toll free National Suicide Prevention Hotlines   · National Suicide Prevention Lifeline 129-578-ISII (0260)  · [employer] Line Network 800-SUICIDE (059-1990)        Syncope  Syncope is a medical term for fainting or passing out. This means you lose consciousness and drop to the ground. People are generally unconscious for less than 5 minutes. You may have some muscle twitches for up to 15 seconds before waking up and returning to normal. Syncope occurs more often in older adults, but it can happen to anyone. While most causes of syncope are not dangerous, syncope can be a sign of a serious medical problem. It is important to seek medical care.   CAUSES   Syncope is caused by a sudden drop in blood flow to the brain. The specific cause is often not determined. Factors that can bring on syncope include:  · Taking medicines that lower blood pressure.  · Sudden changes in posture, such as standing up quickly.  · Taking more medicine than prescribed.  · Standing in one place for too long.  · Seizure disorders.  · Dehydration and excessive exposure to heat.  · Low blood sugar (hypoglycemia).  · Straining to have a bowel movement.  · Heart disease, irregular heartbeat, or other circulatory problems.  · Fear, emotional distress, seeing blood, or severe pain.  SYMPTOMS   Right before fainting, you may:  · Feel dizzy or light-headed.  · Feel nauseous.  · See all white or all black in your field of vision.  · Have cold, clammy skin.  DIAGNOSIS   Your health care provider will ask about your symptoms, perform a physical exam, and perform an electrocardiogram (ECG) to record the electrical activity of your heart. Your health care provider may also perform other heart  or blood tests to determine the cause of your syncope which may include:  · Transthoracic echocardiogram (TTE). During echocardiography, sound waves are used to evaluate how blood flows through your heart.  · Transesophageal echocardiogram (SCOTT).  · Cardiac monitoring. This allows your health care provider to monitor your heart rate and rhythm in real time.  · Holter monitor. This is a portable device that records your heartbeat and can help diagnose heart arrhythmias. It allows your health care provider to track your heart activity for several days, if needed.  · Stress tests by exercise or by giving medicine that makes the heart beat faster.  TREATMENT   In most cases, no treatment is needed. Depending on the cause of your syncope, your health care provider may recommend changing or stopping some of your medicines.  HOME CARE INSTRUCTIONS  · Have someone stay with you until you feel stable.  · Do not drive, use machinery, or play sports until your health care provider says it is okay.  · Keep all follow-up appointments as directed by your health care provider.  · Lie down right away if you start feeling like you might faint. Breathe deeply and steadily. Wait until all the symptoms have passed.  · Drink enough fluids to keep your urine clear or pale yellow.  · If you are taking blood pressure or heart medicine, get up slowly and take several minutes to sit and then stand. This can reduce dizziness.  SEEK IMMEDIATE MEDICAL CARE IF:   · You have a severe headache.  · You have unusual pain in the chest, abdomen, or back.  · You are bleeding from your mouth or rectum, or you have black or tarry stool.  · You have an irregular or very fast heartbeat.  · You have pain with breathing.  · You have repeated fainting or seizure-like jerking during an episode.  · You faint when sitting or lying down.  · You have confusion.  · You have trouble walking.  · You have severe weakness.  · You have vision problems.  If you fainted,  call your local emergency services (911 in [place] Do not drive yourself to the hospital.      This information is not intended to replace advice given to you by your health care provider. Make sure you discuss any questions you have with your health care provider.     Document Released: 12/18/2006 Document Revised: 05/03/2016 Document Reviewed: 02/15/2013  Elsevier Interactive Patient Education ©2016 [employer]

## 2017-07-29 NOTE — CARE PLAN
Problem: Safety  Goal: Will remain free from falls  Outcome: PROGRESSING SLOWER THAN EXPECTED  Katja Gaitan Fall Risk Assessment:     Last Known Fall: During the current hospitalization  Mobility: Immobilized/requires assist of one person  Medications: No meds  Mental Status/LOC/Awareness: Memory loss/confusion and requires reorienting  Toileting Needs: Diarrhea/frequency/urgency  Volume/Electrolyte Status: No problems  Communication/Sensory: Visual (Glasses)/hearing deficit  Behavior: Impulsiveness, Behavioral noncompliance with instruction  Katja Gaitan Fall Risk Total: 21  Fall Risk Level: HIGH RISK    Universal Fall Precautions:  call light/belongings in reach, bed in low position and locked, wheelchairs and assistive devices out of sight, siderails up x 2, use non-slip footwear, adequate lighting, clutter free and spill free environment, educate on level of risk, educate to call for assistance    Fall Risk Level Interventions:    TRIAL (TELE 8, NEURO, MED MATIAS 5) Moderate Fall Risk Interventions  Place yellow fall risk ID band on patient: completed  Provide patient/family education based on risk assessment : completed  Educate patient/family to call staff for assistance when getting out of bed: completed  Place fall precaution signage outside patient door: completed  Utilize bed/chair fall alarm: completedTRIAL (TELE 8, NEURO, MED MATIAS 5) High Fall Risk Interventions  Place yellow fall risk ID band on patient: verified  Provide patient/family education based on risk assessment: completed  Educate patient/family to call staff for assistance when getting out of bed: completed  Place fall precaution signage outside patient door: verified  Place patient in room close to nursing station: verified  Utilize bed/chair fall alarm: verified  Notify charge of high risk for huddle: verified    Patient Specific Interventions:     Medication: review medications with patient and family, assess for medications that can be  discontinued or dosage decreased and limit combination of prn medications  Mental Status/LOC/Awareness: reorient patient, reinforce falls education, check on patient hourly, utilize bed/chair fall alarm and reinforce the use of call light  Toileting: provide frquent toileting, monitor intake and output/use of appropriate interventions, instruct patient/family on the need to call for assistance when toileting and do not leave patient unattended in bathroom/refer to toileting scripting  Volume/Electrolyte Status: ensure patient remains hydrated  Communication/Sensory: update plan of care on whiteboard, ensure patient has glasses/contacts and hearing aids/dentures and have patients with hearing deficit repeat information back to you to ensure proper understanding  Behavioral: engage patient in daily activities, administer medication as ordered and instruct/reinforce fall program rationale  Mobility: utilize bed/chair fall alarm, ensure bed is locked and in lowest position and provide appropriate assistive device

## 2017-07-29 NOTE — PROGRESS NOTES
Received report at bedside and assumed care of patient at 1900. Pt resting comfortably in bed at this time.  Bed alarm in place, bed in low and locked position, call light within reach, non-slip socks on patient, hourly rounding in place. No signs of distress noted or reported at this time. Will continue to monitor.

## 2017-07-29 NOTE — PROGRESS NOTES
Prior to pacer replacement: A fib 50-87, multiple arrests 2.3-3.4 sec.  After placer placement: 100% Paced 70

## 2017-07-29 NOTE — DISCHARGE PLANNING
Pt dc'd without home health arrangement. NIURKA f/u with RN who advised pt lives with his son and dtr and did not need home health. Further, pt has [place] [employer] [facility] and the only home health available in his area works exclusively with Medicare.

## 2017-07-29 NOTE — PROGRESS NOTES
Spoke to Davion Wilson to report patient having frequency and that post-void bladder scan showed 666ml. Received order to straight cath once.

## 2017-07-29 NOTE — PROGRESS NOTES
Pt discharged to home PT states they understand discharge instructions with no further question. Tele monitor and PIV removed. All belongings with pt. Discharged by transport via wheelchair.

## 2017-07-31 ENCOUNTER — HOSPITAL ENCOUNTER (OUTPATIENT)
Facility: MEDICAL CENTER | Age: 82
End: 2017-08-07
Attending: EMERGENCY MEDICINE | Admitting: HOSPITALIST
Payer: COMMERCIAL

## 2017-07-31 ENCOUNTER — HOSPITAL ENCOUNTER (OUTPATIENT)
Dept: RADIOLOGY | Facility: MEDICAL CENTER | Age: 82
End: 2017-07-31

## 2017-07-31 ENCOUNTER — TELEPHONE (OUTPATIENT)
Dept: CARDIOLOGY | Facility: MEDICAL CENTER | Age: 82
End: 2017-07-31

## 2017-07-31 ENCOUNTER — RESOLUTE PROFESSIONAL BILLING HOSPITAL PROF FEE (OUTPATIENT)
Dept: HOSPITALIST | Facility: MEDICAL CENTER | Age: 82
End: 2017-07-31
Payer: COMMERCIAL

## 2017-07-31 DIAGNOSIS — T14.8XXA ABRASION: ICD-10-CM

## 2017-07-31 DIAGNOSIS — I35.0 AORTIC VALVE STENOSIS, UNSPECIFIED ETIOLOGY: ICD-10-CM

## 2017-07-31 DIAGNOSIS — I50.9 CHRONIC CONGESTIVE HEART FAILURE, UNSPECIFIED CONGESTIVE HEART FAILURE TYPE: ICD-10-CM

## 2017-07-31 DIAGNOSIS — I48.91 ATRIAL FIBRILLATION, UNSPECIFIED TYPE (HCC): ICD-10-CM

## 2017-07-31 DIAGNOSIS — I10 ESSENTIAL HYPERTENSION: ICD-10-CM

## 2017-07-31 DIAGNOSIS — Z95.2 HISTORY OF AORTIC VALVE REPLACEMENT: ICD-10-CM

## 2017-07-31 DIAGNOSIS — R41.0 CONFUSION: ICD-10-CM

## 2017-07-31 DIAGNOSIS — R55 SYNCOPE, UNSPECIFIED SYNCOPE TYPE: ICD-10-CM

## 2017-07-31 DIAGNOSIS — S09.90XA CLOSED HEAD INJURY, INITIAL ENCOUNTER: ICD-10-CM

## 2017-07-31 PROBLEM — R79.89 ELEVATED TROPONIN: Status: ACTIVE | Noted: 2017-07-31

## 2017-07-31 PROBLEM — E03.9 HYPOTHYROIDISM: Status: ACTIVE | Noted: 2017-07-31

## 2017-07-31 LAB
APTT PPP: 46.2 SEC (ref 24.7–36)
EKG IMPRESSION: NORMAL
GLUCOSE BLD-MCNC: 117 MG/DL (ref 65–99)
INR PPP: 3.11 (ref 0.87–1.13)
PROTHROMBIN TIME: 33 SEC (ref 12–14.6)
TROPONIN I SERPL-MCNC: 0.21 NG/ML (ref 0–0.04)
TROPONIN I SERPL-MCNC: 0.23 NG/ML (ref 0–0.04)

## 2017-07-31 PROCEDURE — G0378 HOSPITAL OBSERVATION PER HR: HCPCS

## 2017-07-31 PROCEDURE — 93005 ELECTROCARDIOGRAM TRACING: CPT

## 2017-07-31 PROCEDURE — 99285 EMERGENCY DEPT VISIT HI MDM: CPT

## 2017-07-31 PROCEDURE — 700102 HCHG RX REV CODE 250 W/ 637 OVERRIDE(OP)

## 2017-07-31 PROCEDURE — 99220 PR INITIAL OBSERVATION CARE,LEVL III: CPT | Performed by: HOSPITALIST

## 2017-07-31 PROCEDURE — 85610 PROTHROMBIN TIME: CPT

## 2017-07-31 PROCEDURE — 36415 COLL VENOUS BLD VENIPUNCTURE: CPT

## 2017-07-31 PROCEDURE — 82962 GLUCOSE BLOOD TEST: CPT

## 2017-07-31 PROCEDURE — 85730 THROMBOPLASTIN TIME PARTIAL: CPT

## 2017-07-31 PROCEDURE — 700105 HCHG RX REV CODE 258: Performed by: EMERGENCY MEDICINE

## 2017-07-31 PROCEDURE — 84484 ASSAY OF TROPONIN QUANT: CPT

## 2017-07-31 PROCEDURE — A9270 NON-COVERED ITEM OR SERVICE: HCPCS

## 2017-07-31 PROCEDURE — 96360 HYDRATION IV INFUSION INIT: CPT

## 2017-07-31 RX ORDER — WARFARIN SODIUM 5 MG/1
5 TABLET ORAL
Status: COMPLETED | OUTPATIENT
Start: 2017-07-31 | End: 2017-07-31

## 2017-07-31 RX ORDER — AMOXICILLIN 250 MG
2 CAPSULE ORAL 2 TIMES DAILY
Status: DISCONTINUED | OUTPATIENT
Start: 2017-07-31 | End: 2017-08-07 | Stop reason: HOSPADM

## 2017-07-31 RX ORDER — OXYCODONE HYDROCHLORIDE 5 MG/1
5 TABLET ORAL
Status: DISCONTINUED | OUTPATIENT
Start: 2017-07-31 | End: 2017-08-07 | Stop reason: HOSPADM

## 2017-07-31 RX ORDER — ONDANSETRON 4 MG/1
4 TABLET, ORALLY DISINTEGRATING ORAL EVERY 4 HOURS PRN
Status: DISCONTINUED | OUTPATIENT
Start: 2017-07-31 | End: 2017-08-07 | Stop reason: HOSPADM

## 2017-07-31 RX ORDER — ONDANSETRON 2 MG/ML
4 INJECTION INTRAMUSCULAR; INTRAVENOUS EVERY 4 HOURS PRN
Status: DISCONTINUED | OUTPATIENT
Start: 2017-07-31 | End: 2017-08-07 | Stop reason: HOSPADM

## 2017-07-31 RX ORDER — ACETAMINOPHEN 325 MG/1
650 TABLET ORAL EVERY 6 HOURS PRN
Status: DISCONTINUED | OUTPATIENT
Start: 2017-07-31 | End: 2017-08-07 | Stop reason: HOSPADM

## 2017-07-31 RX ORDER — DEXTROSE MONOHYDRATE 25 G/50ML
25 INJECTION, SOLUTION INTRAVENOUS
Status: DISCONTINUED | OUTPATIENT
Start: 2017-07-31 | End: 2017-08-07 | Stop reason: HOSPADM

## 2017-07-31 RX ORDER — MORPHINE SULFATE 4 MG/ML
2 INJECTION, SOLUTION INTRAMUSCULAR; INTRAVENOUS
Status: DISCONTINUED | OUTPATIENT
Start: 2017-07-31 | End: 2017-08-07 | Stop reason: HOSPADM

## 2017-07-31 RX ORDER — SPIRONOLACTONE 25 MG/1
25 TABLET ORAL DAILY
Status: DISCONTINUED | OUTPATIENT
Start: 2017-08-01 | End: 2017-08-02

## 2017-07-31 RX ORDER — BETAMETHASONE DIPROPIONATE 0.5 MG/G
CREAM TOPICAL DAILY
Status: DISCONTINUED | OUTPATIENT
Start: 2017-08-01 | End: 2017-08-04

## 2017-07-31 RX ORDER — FUROSEMIDE 40 MG/1
80 TABLET ORAL DAILY
Status: DISCONTINUED | OUTPATIENT
Start: 2017-08-01 | End: 2017-08-02

## 2017-07-31 RX ORDER — FINASTERIDE 5 MG/1
5 TABLET, FILM COATED ORAL DAILY
Status: DISCONTINUED | OUTPATIENT
Start: 2017-08-01 | End: 2017-08-07 | Stop reason: HOSPADM

## 2017-07-31 RX ORDER — TAMSULOSIN HYDROCHLORIDE 0.4 MG/1
0.4 CAPSULE ORAL
Status: DISCONTINUED | OUTPATIENT
Start: 2017-08-01 | End: 2017-08-07 | Stop reason: HOSPADM

## 2017-07-31 RX ORDER — METOPROLOL SUCCINATE 25 MG/1
25 TABLET, EXTENDED RELEASE ORAL DAILY
Status: DISCONTINUED | OUTPATIENT
Start: 2017-08-01 | End: 2017-08-02

## 2017-07-31 RX ORDER — OXYCODONE HYDROCHLORIDE 5 MG/1
2.5 TABLET ORAL
Status: DISCONTINUED | OUTPATIENT
Start: 2017-07-31 | End: 2017-08-07 | Stop reason: HOSPADM

## 2017-07-31 RX ORDER — POLYETHYLENE GLYCOL 3350 17 G/17G
1 POWDER, FOR SOLUTION ORAL
Status: DISCONTINUED | OUTPATIENT
Start: 2017-07-31 | End: 2017-08-07 | Stop reason: HOSPADM

## 2017-07-31 RX ORDER — LEVOTHYROXINE SODIUM 0.1 MG/1
100 TABLET ORAL DAILY
Status: DISCONTINUED | OUTPATIENT
Start: 2017-08-01 | End: 2017-08-07 | Stop reason: HOSPADM

## 2017-07-31 RX ORDER — SODIUM CHLORIDE 9 MG/ML
500 INJECTION, SOLUTION INTRAVENOUS ONCE
Status: COMPLETED | OUTPATIENT
Start: 2017-07-31 | End: 2017-08-01

## 2017-07-31 RX ORDER — BISACODYL 10 MG
10 SUPPOSITORY, RECTAL RECTAL
Status: DISCONTINUED | OUTPATIENT
Start: 2017-07-31 | End: 2017-08-07 | Stop reason: HOSPADM

## 2017-07-31 RX ORDER — LISINOPRIL 5 MG/1
5 TABLET ORAL DAILY
Status: DISCONTINUED | OUTPATIENT
Start: 2017-08-01 | End: 2017-08-02

## 2017-07-31 RX ADMIN — WARFARIN SODIUM 5 MG: 5 TABLET ORAL at 22:52

## 2017-07-31 RX ADMIN — SODIUM CHLORIDE 500 ML: 9 INJECTION, SOLUTION INTRAVENOUS at 18:00

## 2017-07-31 ASSESSMENT — COGNITIVE AND FUNCTIONAL STATUS - GENERAL
SUGGESTED CMS G CODE MODIFIER MOBILITY: CL
SUGGESTED CMS G CODE MODIFIER DAILY ACTIVITY: CL
TURNING FROM BACK TO SIDE WHILE IN FLAT BAD: A LOT
EATING MEALS: A LOT
MOVING FROM LYING ON BACK TO SITTING ON SIDE OF FLAT BED: A LOT
DRESSING REGULAR LOWER BODY CLOTHING: A LOT
MOVING TO AND FROM BED TO CHAIR: A LOT
MOBILITY SCORE: 12
WALKING IN HOSPITAL ROOM: A LOT
DRESSING REGULAR UPPER BODY CLOTHING: A LOT
TOILETING: A LOT
HELP NEEDED FOR BATHING: A LOT
PERSONAL GROOMING: A LOT
STANDING UP FROM CHAIR USING ARMS: A LOT
DAILY ACTIVITIY SCORE: 12
CLIMB 3 TO 5 STEPS WITH RAILING: A LOT

## 2017-07-31 ASSESSMENT — ENCOUNTER SYMPTOMS
DIARRHEA: 0
SHORTNESS OF BREATH: 0
WHEEZING: 0
VOMITING: 0
CONSTIPATION: 0
NAUSEA: 0
COUGH: 0
CHILLS: 0
FEVER: 0
HEADACHES: 0

## 2017-07-31 ASSESSMENT — LIFESTYLE VARIABLES: ALCOHOL_USE: NO

## 2017-07-31 ASSESSMENT — PAIN SCALES - GENERAL: PAINLEVEL_OUTOF10: ASSUMED PAIN PRESENT

## 2017-07-31 NOTE — IP AVS SNAPSHOT
" <p align=\"LEFT\"><IMG SRC=\"//[email or web address]$/[email or web address]\" alt=\"Image\" WIDTH=\"50%\" HEIGHT=\"200\" BORDER=\"\"></p>                   Name:Monster Miramontes  Medical Record Number:[id number]  CSN: [id number]    YOB: 1928   Age: 89 y.o.  Sex: male  HT:1.905 m (6' 3\") WT: 74.3 kg (163 lb 12.8 oz)          Admit Date: 7/31/2017     Discharge Date:   Today's Date: 8/7/2017  Attending Doctor:  Hira Lutz M.D.                  Allergies:  Clonidine          Your appointments     Aug 11, 2017  3:15 PM   PACER CHECK ONLY with PACER CHECK-[facility] 2   [facility] (--)    [address]  [facility] [address]   [phone]            Aug 11, 2017  4:00 PM   HOSPITAL FOLLOW UP with JAYE Goodson   [facility] (--)    [address]  [facility] [address]   [phone]              Follow-up Information     1. Follow up with [facility] ([facility]) .    Specialty:  Skilled Nursing Facility    Contact information    [address] [facility] [address]  [phone]         Medication List      Take these Medications        Instructions    betamethasone dipropionate 0.05 % lotion    Apply 1 Application to affected area(s) every bedtime.   Dose:  1 Application       finasteride 5 MG Tabs   Commonly known as:  PROSCAR    Take 5 mg by mouth every day.   Dose:  5 mg       furosemide 40 MG Tabs   What changed:  how much to take   Commonly known as:  LASIX    Take 1 Tab by mouth every day.   Dose:  40 mg       glimepiride 2 MG Tabs   Commonly known as:  AMARYL    Take 2 mg by mouth every morning.   Dose:  2 mg       levothyroxine 100 MCG Tabs   Commonly known as:  SYNTHROID    Take 100 mcg by mouth every day.   Dose:  100 mcg       polyethylene glycol/lytes Pack   Commonly known as:  MIRALAX    Take 1 Packet by mouth every day.   Dose:  17 g       spironolactone 25 MG Tabs   Commonly known as:  " ALDACTONE    Take 12.5 mg by mouth every day.   Dose:  12.5 mg       tamsulosin 0.4 MG capsule   Commonly known as:  FLOMAX    Take 0.4 mg by mouth ONE-HALF HOUR AFTER BREAKFAST.   Dose:  0.4 mg       warfarin 5 MG Tabs   Commonly known as:  COUMADIN    Take 5-7.5 mg by mouth every day. 7.5mg on Monday/Wednesday/Friday, 5mg on all other days   Dose:  5-7.5 mg

## 2017-07-31 NOTE — OP REPORT
[facility] ELECTROPHYSIOLOGY PROCEDURE NOTE    PROCEDURE(S) PERFORMED:   1) Permanent Pacemaker Implantation  2) CS lead at time of generator implant  3) Pacemaker generator removal    DATE OF SERVICE: 7/28/2017    : Ewa Davis MD    ASSISTANT: None    ANESTHESIA: local and moderate sedation    EBL: 30 cc    SPECIMENS: None    COMPLICATION(S): None    INDICATION(S):  1)Complete heart block  2)Pacemaker lead fracture  3)Systolic congestive heart failure  4)Nonspecific intraventricular conduction delay    BASELINE RHYTHM AND RHYTHM AT END OF PROCEDURE: Atrial fibrillation with junction escape at baseline, atrial fibrillation ventricular paced at 70 at end of procedure    DESCRIPTION OF PROCEDURE:    After informed written consent, the patient was brought to the electrophysiology lab in the fasting, unsedated state. The patient was prepped and draped in the usual sterile fashion. The procedure was performed under moderate sedation with local anesthetic. A left upper extremity venogram was performed, demonstrating a occluded L subclavian vein with various collateral filling. It was unclear whether there was any flow down the main subclavian vein. A left infraclavicular incision was made with a scalpel and the old pectoral device pocket was accessed using a combination of blunt dissection and electrocautery. The modified Seldinger technique was used to gain access to the left axillary vein. However a micropuncture wire could not be passed due to the obstruction. The old generator was removed and the old ventricular lead was capped. The pocket was irrigated with antibiotic solution and closed with three layers of absorbable sutures. A right upper extremity venogram was then performed showing a patent R axillary and subclavian vein. A right sided infraclavicular incision was made and a pre-pectoral device pocket was created using a combination of electrocautery and blunt dissection. The R axillary vein was  "accessed using a modified Seldinger technique x 2.  A peel-away hemostasis sheath was placed in the vein. Under fluoroscopic guidance, the pacemaker leads were introduced into the heart. The ventricular lead was advanced to the RVOT and then lowered into position at the RV apex. The lead was tested and had satisfactory sensing and pacing parameters. High output ventricular pacing did not produce extracardiac stimulation. The lead was sutured to the underlying pectoral muscle with interrupted silk over a silastic suture sleeve. The CS was accessed using a R sided CS sheath and an 0.35\" glide-wire (an AL2 coronary catheter was used without success). A balloon tipped venogram was performed showing a suitable posterolateral branch. A subacute angle inner sheath was used to cannulate this branch in which a coronary sinus lead was introduced over a Whisper coronary wire. Lead had satisfactory capture thresholds here without diaphragm stimulation. The sheaths were slit with the supplied cutting tool. The CS lead was sutured to the underlying muscle over the suture sleeve using silk. The new device pocket was irrigated with antibiotic solution, inspected, and no bleeding was seen. The atrial port on the generator was capped. The leads were connected to the pacemaker pulse generator and the device was inserted into the pocket. The R sided wound was closed with three layers of absorbable sutures. Following recovery from sedation, the patient was transferred to a monitored bed in good condition.     REMOVED DEVICE INFORMATION:  Pulse generator is a SJM model VB4192   Serial # [id number]    CAPPED LEAD INFORMATION:  Right ventricular lead is a SJM model #1688TC/58, serial#[id number]    IMPLANTED DEVICE INFORMATION:  Pulse generator is a SJM model NM8772  Serial # [id number]    LEAD INFORMATION:  1)Right ventricular lead is a SJM model #2088TC/52 , serial #[id number] ,P wave 2.7 millivolts, threshold 0.5 Volts at 0.5 milliseconds, pacing " impedance 580 Ohms.    2)CS lead is a SJM model #1458/Q/86, serial #[id number] , threshold 1.5Volts at 0.5 milliseconds, pacing impedance 810 Ohms.    DEVICE PROGRAMMING:  DDD 70 -120 ppm    FLUOROSCOPY TIME: 26.2 minutes    IMPRESSIONS:  1. Successful biventricular pacemaker implantation    RECOMMENDATIONS:  1. Transfer to monitored bed  2. Chest x-ray  3. Device interrogation prior to hospital discharge  4. Followup in device clinic

## 2017-07-31 NOTE — IP AVS SNAPSHOT
8/7/2017    Monster Miramontes  [place] [facility] [address]    Dear Monster:    [facility] wants to ensure your discharge home is safe and you or your loved ones have had all of your questions answered regarding your care after you leave the hospital.    Below is a list of resources and contact information should you have any questions regarding your hospital stay, follow-up instructions, or active medical symptoms.    Questions or Concerns Regarding… Contact   Medical Questions Related to Your Discharge  (7 days a week, 8am-5pm) Contact a Nurse Care Coordinator   [phone]   Medical Questions Not Related to Your Discharge  (24 hours a day / 7 days a week)  Contact the Nurse Health Line   [phone]    Medications or Discharge Instructions Refer to your discharge packet   or contact your [facility] Primary Care Provider   [phone]   Follow-up Appointment(s) Schedule your appointment via [employer]   or contact Scheduling [phone]   Billing Review your statement via [employer]  or contact Billing [phone]   Medical Records Review your records via [employer]   or contact Medical Records [phone]     You may receive a telephone call within two days of discharge. This call is to make certain you understand your discharge instructions and have the opportunity to have any questions answered. You can also easily access your medical information, test results and upcoming appointments via the [employer] free online health management tool. You can learn more and sign up at [employer]. For assistance setting up your [employer] account, please call [phone].    Once again, we want to ensure your discharge home is safe and that you have a clear understanding of any next steps in your care. If you have any questions or concerns, please do not hesitate to contact us, we are here for you. Thank you for choosing [facility] for your healthcare needs.    Sincerely,    Your [facility] Healthcare Team

## 2017-07-31 NOTE — IP AVS SNAPSHOT
[employer] Access Code: [email or web address]  Expires: 8/28/2017 11:39 AM    [employer]  A secure, online tool to manage your health information     [employer]’s [employer]® is a secure, online tool that connects you to your personalized health information from the privacy of your home -- day or night - making it very easy for you to manage your healthcare. Once the activation process is completed, you can even access your medical information using the [employer] judith, which is available for free in the [employer] Judith store or Google Play store.     [employer] provides the following levels of access (as shown below):   My Chart Features   [facility] Primary Care Doctor [facility]  Specialists [facility]  Urgent  Care Non-[facility]  Primary Care  Doctor   Email your healthcare team securely and privately 24/7 X X X X   Manage appointments: schedule your next appointment; view details of past/upcoming appointments X      Request prescription refills. X      View recent personal medical records, including lab and immunizations X X X X   View health record, including health history, allergies, medications X X X X   Read reports about your outpatient visits, procedures, consult and ER notes X X X X   See your discharge summary, which is a recap of your hospital and/or ER visit that includes your diagnosis, lab results, and care plan. X X       How to register for [employer]:  1. Go to  [email or web address].  2. Click on the Sign Up Now box, which takes you to the New Member Sign Up page. You will need to provide the following information:  a. Enter your [employer] Access Code exactly as it appears at the top of this page. (You will not need to use this code after you’ve completed the sign-up process. If you do not sign up before the expiration date, you must request a new code.)   b. Enter your date of birth.   c. Enter your home email address.   d. Click Submit, and follow the next screen’s instructions.  3. Create a [employer] ID. This will be your [employer]  login ID and cannot be changed, so think of one that is secure and easy to remember.  4. Create a [employer] password. You can change your password at any time.  5. Enter your Password Reset Question and Answer. This can be used at a later time if you forget your password.   6. Enter your e-mail address. This allows you to receive e-mail notifications when new information is available in [employer].  7. Click Sign Up. You can now view your health information.    For assistance activating your [employer] account, call [phone]

## 2017-07-31 NOTE — IP AVS SNAPSHOT
" Home Care Instructions                                                                                                                  Name:[facility]  Medical Record Number:[id number]  CSN: [id number]    YOB: 1928   Age: 89 y.o.  Sex: male  HT:1.905 m (6' 3\") WT: 74.3 kg (163 lb 12.8 oz)          Admit Date: 7/31/2017     Discharge Date:   Today's Date: 8/7/2017  Attending Doctor:  Hira Lutz M.D.                  Allergies:  Clonidine            Discharge Instructions       Discharge Instructions    Discharged to other by medical transportation with escort. Discharged via wheelchair, hospital escort: Yes.  Special equipment needed: Not Applicable    Be sure to schedule a follow-up appointment with your primary care doctor or any specialists as instructed.     Discharge Plan:   Influenza Vaccine Indication: Not indicated: Previously immunized this influenza season and > 8 years of age    I understand that a diet low in cholesterol, fat, and sodium is recommended for good health. Unless I have been given specific instructions below for another diet, I accept this instruction as my diet prescription.   Other diet:     Special Instructions: None    · Is patient discharged on Warfarin / Coumadin?   No     · Is patient Post Blood Transfusion?  No    Depression / Suicide Risk    As you are discharged from this [facility] facility, it is important to learn how to keep safe from harming yourself.    Recognize the warning signs:  · Abrupt changes in personality, positive or negative- including increase in energy   · Giving away possessions  · Change in eating patterns- significant weight changes-  positive or negative  · Change in sleeping patterns- unable to sleep or sleeping all the time   · Unwillingness or inability to communicate  · Depression  · Unusual sadness, discouragement and loneliness  · Talk of wanting to die  · Neglect of personal appearance   · Rebelliousness- reckless " behavior  · Withdrawal from people/activities they love  · Confusion- inability to concentrate     If you or a loved one observes any of these behaviors or has concerns about self-harm, here's what you can do:  · Talk about it- your feelings and reasons for harming yourself  · Remove any means that you might use to hurt yourself (examples: pills, rope, extension cords, firearm)  · Get professional help from the community (Mental Health, Substance Abuse, psychological counseling)  · Do not be alone:Call your Safe Contact- someone whom you trust who will be there for you.  · Call your local CRISIS HOTLINE 707-0249 or 661-090-2289  · Call your local Children's Mobile Crisis Response Team [place] (865) 456-1152 or www.[employer]  · Call the toll free National Suicide Prevention Hotlines   · National Suicide Prevention Lifeline 476-422-DTCO (3322)  · [place] [employer] 800-OYCULIK (166-2216)    Pacemaker Follow-up   A pacemaker follow-up is done to evaluate the pacemaker and its battery (pulse generator). Regular follow-up visits must be done to assess pacemaker function. These visits are determined by how old the pacemaker is or if there are signs of a low battery. Most pacemakers can also sense if your heart has had any abnormal heart beats (arrhythmias). Different kinds of tests are used to look at pacemaker function, check for abnormal heart beats, and assess battery life. If the battery energy is low, you may need a new battery.  BEFORE THE PROCEDURE   Your caregiver may do a physical exam to:  · Examine your neck veins for swelling (engorgement).   · Feel the skin over the pacemaker for swelling or tenderness.   · Check your skin over the pacemaker for signs of redness or wearing away.   · Make certain the pacemaker has not moved from its original position.   LET YOUR CAREGIVER KNOW ABOUT:   · Chest pain.   · Dizziness.   · Fainting (syncope).   · Skipped heart beats (palpitations).   · Fatigue  "or low energy.   · Any car accidents, falls, or injuries that have happened. It is very important for your caregiver to know about any injury that has occurred to your chest or back. Injuries near the pacemaker can affect how it functions.   · Any type of \"hiccuping\" or \"jumping\" near your upper stomach (diaphragm). If this happens, the pacemaker settings may need to be changed. This does not mean your pacemaker is malfunctioning.   PROCEDURE  There are different ways your pacemaker can be checked. These include:   COMPREHENSIVE EVALUATION  To perform a comprehensive evaluation, your caregiver may use a computer called a . The  has a special wand that is placed over the pacemaker. The wand communicates with the pacemaker through a radio wave signal. The signal receives information from the pulse generator. By this method, your caregiver can assess pacemaker sensing, function, and battery life. Your caregiver can also make changes to the pacemaker settings. A comprehensive evaluation helps your caregiver fine-tune your pacemaker.  PACEMAKER MAGNET ASSESSMENT  Your caregiver may perform a magnet test of your pacemaker. This test looks at pacemaker function and battery life. A special magnet is placed over your pacemaker. The magnet causes the pacemaker to function at a fixed rate. The magnet provides some basic testing that can be done at home or in your caregiver's office.   TRANS-TELEPHONIC MONITORING  Your caregiver can also monitor your pacemaker at home. This is done over the telephone using a trans-telephonic method. A special device called a trans-telephonic transmitter sends information about your pacemaker to your caregiver's office. The information received allows your caregiver to see if the pacemaker is sensing the heartbeats properly, getting a low battery, or pacing abnormally.   AFTER THE PROCEDURE  · You will get a copy of your follow-up visit. The information will have the model " number, date, current function and remaining battery life.   · Keep your pacemaker card with you at all times. It is important to know:   · Implant date.   · Type (NBE code).   · Brand.   · .   · Programmed rate.   · Generator model.   · If the pacemaker has detected an abnormal heart beat, more testing may be needed.   Document Released: 09/26/2009 Document Revised: 03/11/2013 Document Reviewed: 09/26/2009  [employer]® Patient Information ©2013 [employer].    Warfarin Coagulopathy  Warfarin (Coumadin®) coagulopathy refers to bleeding that may occur as a complication of the medicine warfarin. Warfarin is an oral blood thinner (anticoagulant). Warfarin is used for medical conditions where thinning of the blood is needed to prevent blood clots.   CAUSES  Bleeding is the most common and most serious complication of warfarin. The amount of bleeding is related to the warfarin dose and length of treatment. In addition, bleeding complications can also occur due to:  · Intentional or accidental warfarin overdose.  · Underlying medical conditions.  · Dietary changes.  · Medicine, herbal, supplement, or alcohol interactions.  SYMPTOMS  Severe bleeding while on warfarin may occur from any tissue or organ. Symptoms of the blood being too thin may include:  · Bleeding from the nose or gums.  · Blood in bowel movements which may appear as bright red, dark, or black tarry stools.  · Blood in the urine which may appear as pink, red, or brown urine.  · Unusual bruising or bruising easily.  · A cut that does not stop bleeding within 10 minutes.  · Vomiting blood or continuous nausea for more than 1 day.  · Coughing up blood.  · Broken blood vessels in your eye (subconjunctival hemorrhage).  · Abdominal or back pain with or without flank bruising.  · Sudden, severe headache.  · Sudden weakness or numbness of the face, arm, or leg, especially on one side of the body.  · Sudden confusion.  · Trouble speaking (aphasia) or  understanding.  · Sudden trouble seeing in one or both eyes.  · Sudden trouble walking.  · Dizziness.  · Loss of balance or coordination.  · Vaginal bleeding.  · Swelling or pain at an injection site.  · Superficial fat tissue death (necrosis) which may cause skin scarring. This is more common in women and may first present as pain in the waist, thighs, or buttocks.  HOME CARE INSTRUCTIONS  · Always contact your health care provider of any concerns or signs of possible warfarin coagulopathy as soon as possible.  · Take warfarin exactly as directed by your health care provider. It is recommended that you take your warfarin dose at the same time of the day. If you have been told to stop taking warfarin, do not resume taking warfarin until directed to do so by your health care provider. Follow your health care provider's instructions if you accidentally take an extra dose or miss a dose of warfarin. It is very important to take warfarin as directed since bleeding or blood clots could result in chronic or permanent injury, pain, or disability.  · Keep all follow-up appointments with your health care provider as directed. It is very important to keep your appointments. Not keeping appointments could result in a chronic or permanent injury, pain, or disability because warfarin is a medicine that requires close monitoring.  · While taking warfarin, you will need to have regular blood tests to measure your blood clotting time. These blood tests usually include both the prothrombin time (PT) and International Normalized Ratio (INR) tests. The PT and INR results allow your health care provider to adjust your dose of warfarin. The dose can change for many reasons. It is critically important that you have your PT and INR levels drawn exactly as directed. Your warfarin dose may stay the same or change depending on what the PT and INR results are. Be sure to follow up with your health care provider regarding your PT and INR test  results and what your warfarin dosage should be.  · Many medicines can interfere with warfarin and affect the PT and INR results. You must tell your health care provider about any and all medicines you take. This includes all vitamins and supplements. Ask your health care provider before taking these. Prescription and over-the-counter medicine consistency is critical to warfarin management. It is important that potential interactions are checked before you start a new medicine. Be especially cautious with aspirin and anti-inflammatory medicines. Ask your health care provider before taking these. Medicines such as antibiotics and acid-reducing medicine can interact with warfarin and can cause an increased warfarin effect. Warfarin can also interfere with the effectiveness of medicines you are taking. Do not take or discontinue any prescribed or over-the-counter medicine except on the advice of your health care provider or pharmacist.  · Some vitamins, supplements, and herbal products interfere with the effectiveness of warfarin. Vitamin E may increase the anticoagulant effects of warfarin. Vitamin K can cause warfarin to be less effective. Do not take or discontinue any vitamin, supplement, or herbal product except on the advice of your health care provider or pharmacist.  · Eat what you normally eat and keep the vitamin K content of your diet consistent. Avoid major changes in your diet, or notify your health care provider before changing your diet. Suddenly getting a lot more vitamin K could cause your blood to clot too quickly. A sudden decrease in vitamin K intake could cause your blood to clot too slowly. These changes in vitamin K intake could lead to dangerous blood clots or to bleeding. To keep your vitamin K intake consistent, you must be aware of which foods contain moderate or high amounts of vitamin K. Some foods that are high in vitamin K include spinach, kale, broccoli, cabbage, greens, [place]  sprouts, asparagus, bok dulce, coleslaw, and parsley. If you drink green tea, drink the same amount each day. Arrange a visit with a dietitian to answer your questions.  · If you have a loss of appetite or get the stomach flu (viral gastroenteritis), talk to your health care provider as soon as possible. A decrease in your normal vitamin K intake can make you more sensitive to your usual dose of warfarin.  · Some medical conditions may increase your risk for bleeding while you are taking warfarin. A fever, diarrhea lasting more than a day, worsening heart failure, or worsening liver function are some medical conditions that could affect warfarin. Contact your health care provider if you have any of these medical conditions.  · Be careful not to cut yourself when using sharp objects or while shaving.  · Alcohol can change the body's ability to handle warfarin. It is best to avoid alcoholic drinks or consume only very small amounts while taking warfarin. Notify your health care provider if you change your alcohol intake. A sudden increase in alcohol use can increase your risk of bleeding. Chronic alcohol use can cause warfarin to be less effective.  · Limit physical activities or sports that could result in a fall or cause injury.  · Do not use warfarin if you are pregnant.  · Inform all your health care providers and your dentist that you take warfarin.  · Inform all health care providers if you are taking warfarin and aspirin or platelet inhibitor medicines such as clopidogrel, ticagrelor, or prasugrel. Use of these medicines in addition to warfarin can increase your risk of bleeding or death. Taking these medicines together should only be done under the direct care of your health care providers.  SEEK IMMEDIATE MEDICAL CARE IF:  · You cough up blood.  · You have dark or black stools or there is bright red blood coming from your rectum.  · You vomit blood or have nausea for more than 1 day.  · You have blood in the  "urine or pink-colored urine.  · You have unusual bruising or have increased bruising.  · You have bleeding from the nose or gums that does not stop quickly.  · You have a cut that does not stop bleeding within 2-3 minutes.  · You have sudden weakness or numbness of the face, arm, or leg, especially on one side of the body.  · You have sudden confusion.  · You have trouble speaking (aphasia) or understanding.  · You have sudden trouble seeing in one or both eyes.  · You have sudden trouble walking.  · You have dizziness.  · You have a loss of balance or coordination.  · You have a sudden, severe headache.  · You have a serious fall or head injury, even if you are not bleeding.  · You have swelling or pain at an injection site.  · You have unexplained tenderness or pain in the abdomen, back, waist, thighs, or buttocks.  Any of these symptoms may represent a serious problem that is an emergency. Do not wait to see if the symptoms will go away. Get medical help right away. Call your local emergency services (911 in [place] Do not drive yourself to the hospital.     This information is not intended to replace advice given to you by your health care provider. Make sure you discuss any questions you have with your health care provider.     Document Released: 11/26/2007 Document Revised: 05/03/2016 Document Reviewed: 05/28/2013  [employer] Interactive Patient Education ©2016 [employer]    Warfarin: What You Need to Know  Warfarin is an anticoagulant. Anticoagulants help prevent the formation of blood clots. They also help stop the growth of blood clots. Warfarin is sometimes referred to as a \"blood thinner.\"   Normally, when body tissues are cut or damaged, the blood clots in order to prevent blood loss. Sometimes clots form inside your blood vessels and obstruct the flow of blood through your circulatory system (thrombosis). These clots may travel through your bloodstream and become lodged in smaller blood vessels in your " "brain, which can cause a stroke, or in your lungs (pulmonary embolism).  WHO SHOULD USE WARFARIN?  Warfarin is prescribed for people at risk of developing harmful blood clots:  · People with surgically implanted mechanical heart valves, irregular heart rhythms called atrial fibrillation, and certain clotting disorders.  · People who have developed harmful blood clotting in the past, including those who have had a stroke or a pulmonary embolism, or thrombosis in their legs (deep vein thrombosis [DVT]).  · People with an existing blood clot, such as a pulmonary embolism.  WARFARIN DOSING  Warfarin tablets come in different strengths. Each tablet strength is a different color, with the amount of warfarin (in milligrams) clearly printed on the tablet. If the color of your tablet is different than usual when you receive a new prescription, report it immediately to your pharmacist or health care provider.  WARFARIN MONITORING  The goal of warfarin therapy is to lessen the clotting tendency of blood but not prevent clotting completely. Your health care provider will monitor the anticoagulation effect of warfarin closely and adjust your dose as needed. For your safety, blood tests called prothrombin time (PT) or international normalized ratio (INR) are used to measure the effects of warfarin. Both of these tests can be done with a finger stick or a blood draw.  The longer it takes the blood to clot, the higher the PT or INR. Your health care provider will inform you of your \"target\" PT or INR range. If, at any time, your PT or INR is above the target range, there is a risk of bleeding. If your PT or INR is below the target range, there is a risk of clotting.  Whether you are started on warfarin while you are in the hospital or in your health care provider's office, you will need to have your PT or INR checked within one week of starting the medicine. Initially, some people are asked to have their PT or INR checked as much " as twice a week.  Once you are on a stable maintenance dose, the PT or INR is checked less often, usually once every 2 to 4 weeks. The warfarin dose may be adjusted if the PT or INR is not within the target range. It is important to keep all laboratory and health care provider follow-up appointments. Not keeping appointments could result in a chronic or permanent injury, pain, or disability because warfarin is a medicine that requires close monitoring.  WHAT ARE THE SIDE EFFECTS OF WARFARIN?  · Too much warfarin can cause bleeding (hemorrhage) from any part of the body. This may include bleeding from the gums, blood in the urine, bloody or dark stools, a nosebleed that is not easily stopped, coughing up blood, or vomiting blood.  · Too little warfarin can increase the risk of blood clots.  · Too little or too much warfarin can also increase the risk of a stroke.  · Warfarin use may cause a skin rash or irritation, an unusual fever, continual nausea or stomach upset, or severe pain in your joints or back.  SPECIAL PRECAUTIONS WHILE TAKING WARFARIN  Warfarin should be taken exactly as directed. It is very important to take warfarin as directed since bleeding or blood clots could result in chronic or permanent injury, pain, or disability.  · Take your medicine at the same time every day. If you forget to take your dose, you can take it if it is within 6 hours of when it was due.  · Do not change the dose of warfarin on your own to make up for missed or extra doses.  · If you miss more than 2 doses in a row, you should contact your health care provider for advice.  Avoid situations that cause bleeding. You may have a tendency to bleed more easily than usual while taking warfarin. The following actions can limit bleeding:  · Using a softer toothbrush.  · Flossing with waxed floss rather than unwaxed floss.  · Shaving with an electric razor rather than a blade.  · Limiting the use of sharp objects.  · Avoiding potentially  harmful activities, such as contact sports.  Warfarin and Pregnancy or Breastfeeding  · Warfarin is not advised during the first trimester of pregnancy due to an increased risk of birth defects. In certain situations, a woman may take warfarin after her first trimester of pregnancy. A woman who becomes pregnant or plans to become pregnant while taking warfarin should notify her health care provider immediately.  · Although warfarin does not pass into breast milk, a woman who wishes to breastfeed while taking warfarin should also consult with her health care provider.  Alcohol, Smoking, and Illicit Drug Use  · Alcohol affects how warfarin works in the body. It is best to avoid alcoholic drinks or consume very small amounts while taking warfarin. In general, alcohol intake should be limited to 1 oz (30 mL) of liquor, 6 oz (180 mL) of wine, or 12 oz (360 mL) of beer each day. Notify your health care provider if you change your alcohol intake.  · Smoking affects how warfarin works. It is best to avoid smoking while taking warfarin. Notify your health care provider if you change your smoking habits.  · It is best to avoid all illicit drugs while taking warfarin since there are few studies that show how warfarin interacts with these drugs.  Other Medicines and Dietary Supplements  Many prescription and over-the-counter medicines can interfere with warfarin. Be sure all of your health care providers know you are taking warfarin. Notify your health care provider who prescribed warfarin for you or your pharmacist before starting or stopping any new medicines, including over-the-counter vitamins, dietary supplements, and pain medicines. Your warfarin dose may need to be adjusted.  Some common over-the-counter medicines that may increase the risk of bleeding while taking warfarin include:   · Acetaminophen.  · Aspirin.  · Nonsteroidal anti-inflammatory medicines (NSAIDs), such as ibuprofen or naproxen.  · Vitamin E.  Dietary  Considerations   Foods that have moderate or high amounts of vitamin K can interfere with warfarin. Avoid major changes in your diet or notify your health care provider before changing your diet. Eat a consistent amount of foods that have moderate or high amounts of vitamin K. Eating less foods containing vitamin K can increase the risk of bleeding. Eating more foods containing vitamin K can increase the risk of blood clots. Additional questions about dietary considerations can be discussed with a dietitian.  Foods that are very high in vitamin K:  · Greens, such as Swiss chard and beet, maximiliano, mustard, or turnip greens (fresh or frozen, cooked).  · Kale (fresh or frozen, cooked).  · Parsley (raw).  · Spinach (cooked).  Foods that are high in vitamin K:  · Asparagus (frozen, cooked).  · Broccoli.  · Bok dulce (cooked).  · [place] sprouts (fresh or frozen, cooked).  · Cabbage (cooked).  ·  Coleslaw.  Foods that are moderately high in vitamin K:  · Blueberries.  · Black-eyed peas.  · Endive (raw).  · Green leaf lettuce (raw).  · Green scallions (raw).  · Kale (raw).  · Okra (frozen, cooked).  · Plantains (fried).  · Rell lettuce (raw).  · Sauerkraut (canned).  · Spinach (raw).  CALL YOUR CLINIC OR HEALTH CARE PROVIDER IF YOU:  · Plan to have any surgery or procedure.  · Feel sick, especially if you have diarrhea or vomiting.  · Experience or anticipate any major changes in your diet.  · Start or stop a prescription or over-the-counter medicine.  · Become, plan to become, or think you may be pregnant.  · Are having heavier than usual menstrual periods.  · Have had a fall, accident, or any symptoms of bleeding or unusual bruising.  · Develop an unusual fever.  CALL 911 IN THE [place] OR GO TO THE EMERGENCY DEPARTMENT IF YOU:   · Think you may be having an allergic reaction to warfarin. The signs of an allergic reaction could include itching, rash, hives, swelling, chest tightness, or trouble breathing.  · See signs of  "blood in your urine. The signs could include reddish, pinkish, or tea-colored urine.  · See signs of blood in your stools. The signs could include bright red or black stools.  · Vomit or cough up blood. In these instances, the blood could have either a bright red or a \"coffee-grounds\" appearance.  · Have bleeding that will not stop after applying pressure for 30 minutes such as cuts, nosebleeds, or other injuries.  · Have severe pain in your joints or back.  · Have a new and severe headache.  · Have sudden weakness or numbness of your face, arm, or leg, especially on one side of your body.  · Have sudden confusion or trouble understanding.  · Have sudden trouble seeing in one or both eyes.  · Have sudden trouble walking, dizziness, loss of balance, or coordination.  · Have trouble speaking or understanding (aphasia).     This information is not intended to replace advice given to you by your health care provider. Make sure you discuss any questions you have with your health care provider.     Document Released: 12/18/2006 Document Revised: 01/08/2016 Document Reviewed: 06/13/2014  [employer] Interactive Patient Education ©2016 [employer]        Your appointments     Aug 11, 2017  3:15 PM   PACER CHECK ONLY with PACER CHECK-CAM B 2   [facility] B (--)    [address] [place] [address]  [facility] [address]   [phone]            Aug 11, 2017  4:00 PM   HOSPITAL FOLLOW UP with JAYE Goodson   [facility] B (--)    [address] [place] [address]  [facility] [address]   [phone]              Follow-up Information     1. Follow up with  ([facility]) .    Specialty:  Skilled Nursing Facility    Contact information    [address] [address]  [phone]         Discharge Medication Instructions:    Below are the medications your physician expects you to take upon discharge:    Review all your home " medications and newly ordered medications with your doctor and/or pharmacist. Follow medication instructions as directed by your doctor and/or pharmacist.    Please keep your medication list with you and share with your physician.               Medication List      START taking these medications        Instructions    Morning Afternoon Evening Bedtime    polyethylene glycol/lytes Pack   Last time this was given:  1 Packet on 8/7/2017  8:17 AM   Commonly known as:  MIRALAX   Next Dose Due:  8/8/2017        Take 1 Packet by mouth every day.   Dose:  17 g                          CHANGE how you take these medications        Instructions    Morning Afternoon Evening Bedtime    furosemide 40 MG Tabs   What changed:  how much to take   Last time this was given:  40 mg on 8/7/2017  8:16 AM   Commonly known as:  LASIX   Next Dose Due:  8/8/2017        Take 1 Tab by mouth every day.   Dose:  40 mg                          CONTINUE taking these medications        Instructions    Morning Afternoon Evening Bedtime    betamethasone dipropionate 0.05 % lotion        Apply 1 Application to affected area(s) every bedtime.   Dose:  1 Application                        finasteride 5 MG Tabs   Last time this was given:  5 mg on 8/7/2017  8:16 AM   Commonly known as:  PROSCAR   Next Dose Due:  8/8/2017        Take 5 mg by mouth every day.   Dose:  5 mg                        glimepiride 2 MG Tabs   Commonly known as:  AMARYL   Next Dose Due:  8/8/2017        Take 2 mg by mouth every morning.   Dose:  2 mg                        levothyroxine 100 MCG Tabs   Last time this was given:  100 mcg on 8/7/2017  8:16 AM   Commonly known as:  SYNTHROID   Next Dose Due:  8/8/2017        Take 100 mcg by mouth every day.   Dose:  100 mcg                        spironolactone 25 MG Tabs   Last time this was given:  12.5 mg on 8/7/2017  8:16 AM   Commonly known as:  ALDACTONE   Next Dose Due:  8/8/2017        Take 12.5 mg by mouth every day.   Dose:   12.5 mg                        tamsulosin 0.4 MG capsule   Last time this was given:  0.4 mg on 8/7/2017  8:16 AM   Commonly known as:  FLOMAX   Next Dose Due:  8/8/2017        Take 0.4 mg by mouth ONE-HALF HOUR AFTER BREAKFAST.   Dose:  0.4 mg                        warfarin 5 MG Tabs   Last time this was given:  5 mg on 8/6/2017  5:29 PM   Commonly known as:  COUMADIN   Next Dose Due:  8/7/2017        Take 5-7.5 mg by mouth every day. 7.5mg on Monday/Wednesday/Friday, 5mg on all other days   Dose:  5-7.5 mg                             Where to Get Your Medications      Information about where to get these medications is not yet available     ! Ask your nurse or doctor about these medications    - furosemide 40 MG Tabs  - polyethylene glycol/lytes Pack            Orders for after discharge     REFERRAL TO SKILLED NURSING FACILITY    Complete by:  As directed              Instructions           Diet / Nutrition:    Follow any diet instructions given to you by your doctor or the dietician, including how much salt (sodium) you are allowed each day.    If you are overweight, talk to your doctor about a weight reduction plan.    Activity:    Remain physically active following your doctor's instructions about exercise and activity.    Rest often.     Any time you become even a little tired or short of breath, SIT DOWN and rest.    Worsening Symptoms:    Report any of the following signs and symptoms to the doctor's office immediately:    *Pain of jaw, arm, or neck  *Chest pain not relieved by medication                               *Dizziness or loss of consciousness  *Difficulty breathing even when at rest   *More tired than usual                                       *Bleeding drainage or swelling of surgical site  *Swelling of feet, ankles, legs or stomach                 *Fever (>100ºF)  *Pink or blood tinged sputum  *Weight gain (3lbs/day or 5lbs /week)           *Shock from internal defibrillator (if  applicable)  *Palpitations or irregular heartbeats                *Cool and/or numb extremities    Stroke Awareness    Common Risk Factors for Stroke include:    Age  Atrial Fibrillation  Carotid Artery Stenosis  Diabetes Mellitus  Excessive alcohol consumption  High blood pressure  Overweight   Physical inactivity  Smoking    Warning signs and symptoms of a stroke include:    *Sudden numbness or weakness of the face, arm or leg (especially on one side of the body).  *Sudden confusion, trouble speaking or understanding.  *Sudden trouble seeing in one or both eyes.  *Sudden trouble walking, dizziness, loss of balance or coordination.Sudden severe headache with no known cause.    It is very important to get treatment quickly when a stroke occurs. If you experience any of the above warning signs, call 911 immediately.                   Disclaimer         Quit Smoking / Tobacco Use:    I understand the use of any tobacco products increases my chance of suffering from future heart disease or stroke and could cause other illnesses which may shorten my life. Quitting the use of tobacco products is the single most important thing I can do to improve my health. For further information on smoking / tobacco cessation call a Toll Free Quit Line at 1-907.259.1059 (*[employer] [place]) or 1-711.974.4557 (American Lung Association) or you can access the web based program at www.lungusa.org.    [place] Tobacco Users Help Line:  (565) 889-5436       Toll Free: 1-364.142.9923  Quit Tobacco Program [facility] Management Services [phone])[phone]    Crisis Hotline:    [place] Crisis Hotline:  0-571-GDYSUUD or 1-220.846.9340    [place] Crisis Hotline:    1-864.487.7535 or 784-398-7099    Discharge Survey:   Thank you for choosing [facility]. We hope we did everything we could to make your hospital stay a pleasant one. You may be receiving a phone survey and we would appreciate your time and participation in answering the  questions. Your input is very valuable to us in our efforts to improve our service to our patients and their families.        My signature on this form indicates that:    1. I have reviewed and understand the above information.  2. My questions regarding this information have been answered to my satisfaction.  3. I have formulated a plan with my discharge nurse to obtain my prescribed medications for home.                  Disclaimer         __________________________________                     __________       ________                       Patient Signature                                                 Date                    Time

## 2017-07-31 NOTE — TELEPHONE ENCOUNTER
Message  Received: 2 days ago       TAYO Bolivar R.N. [facility] likes echo w/o contrast in approximately 3 months from today     thx sayeh       Echocardiogram ordered. Left patient a voicemail regarding the order, included Imaging Scheduling phone #[phone].    NAOMY RN

## 2017-08-01 ENCOUNTER — HOSPITAL ENCOUNTER (OUTPATIENT)
Dept: RADIOLOGY | Facility: MEDICAL CENTER | Age: 82
End: 2017-08-01

## 2017-08-01 ENCOUNTER — APPOINTMENT (OUTPATIENT)
Dept: RADIOLOGY | Facility: MEDICAL CENTER | Age: 82
End: 2017-08-01
Attending: INTERNAL MEDICINE
Payer: COMMERCIAL

## 2017-08-01 PROBLEM — N18.30 CKD (CHRONIC KIDNEY DISEASE), STAGE III (HCC): Status: ACTIVE | Noted: 2017-08-01

## 2017-08-01 LAB
ALBUMIN SERPL BCP-MCNC: 2.7 G/DL (ref 3.2–4.9)
APPEARANCE UR: ABNORMAL
BACTERIA #/AREA URNS HPF: NEGATIVE /HPF
BASOPHILS # BLD AUTO: 0.9 % (ref 0–1.8)
BASOPHILS # BLD: 0.08 K/UL (ref 0–0.12)
BILIRUB UR QL STRIP.AUTO: NEGATIVE
BUN SERPL-MCNC: 46 MG/DL (ref 8–22)
CALCIUM SERPL-MCNC: 8.6 MG/DL (ref 8.5–10.5)
CAOX CRY #/AREA URNS HPF: ABNORMAL /HPF
CHLORIDE SERPL-SCNC: 102 MMOL/L (ref 96–112)
CO2 SERPL-SCNC: 24 MMOL/L (ref 20–33)
COLOR UR: ABNORMAL
CREAT SERPL-MCNC: 1.2 MG/DL (ref 0.5–1.4)
EOSINOPHIL # BLD AUTO: 0.15 K/UL (ref 0–0.51)
EOSINOPHIL NFR BLD: 1.8 % (ref 0–6.9)
EPI CELLS #/AREA URNS HPF: ABNORMAL /HPF
ERYTHROCYTE [DISTWIDTH] IN BLOOD BY AUTOMATED COUNT: 44.3 FL (ref 35.9–50)
GFR SERPL CREATININE-BSD FRML MDRD: 57 ML/MIN/1.73 M 2
GLUCOSE BLD-MCNC: 110 MG/DL (ref 65–99)
GLUCOSE BLD-MCNC: 163 MG/DL (ref 65–99)
GLUCOSE BLD-MCNC: 172 MG/DL (ref 65–99)
GLUCOSE SERPL-MCNC: 128 MG/DL (ref 65–99)
GLUCOSE UR STRIP.AUTO-MCNC: NEGATIVE MG/DL
GRAN CASTS #/AREA URNS LPF: ABNORMAL /LPF
HCT VFR BLD AUTO: 36.2 % (ref 42–52)
HGB BLD-MCNC: 12.7 G/DL (ref 14–18)
HYALINE CASTS #/AREA URNS LPF: ABNORMAL /LPF
INR PPP: 3.76 (ref 0.87–1.13)
KETONES UR STRIP.AUTO-MCNC: NEGATIVE MG/DL
LEUKOCYTE ESTERASE UR QL STRIP.AUTO: NEGATIVE
LYMPHOCYTES # BLD AUTO: 1 K/UL (ref 1–4.8)
LYMPHOCYTES NFR BLD: 11.6 % (ref 22–41)
MANUAL DIFF BLD: NORMAL
MCH RBC QN AUTO: 33.6 PG (ref 27–33)
MCHC RBC AUTO-ENTMCNC: 35.1 G/DL (ref 33.7–35.3)
MCV RBC AUTO: 95.8 FL (ref 81.4–97.8)
MICRO URNS: ABNORMAL
MONOCYTES # BLD AUTO: 0.31 K/UL (ref 0–0.85)
MONOCYTES NFR BLD AUTO: 3.6 % (ref 0–13.4)
MORPHOLOGY BLD-IMP: NORMAL
MUCOUS THREADS #/AREA URNS HPF: ABNORMAL /HPF
NEUTROPHILS # BLD AUTO: 7.06 K/UL (ref 1.82–7.42)
NEUTROPHILS NFR BLD: 75 % (ref 44–72)
NEUTS BAND NFR BLD MANUAL: 7.1 % (ref 0–10)
NITRITE UR QL STRIP.AUTO: NEGATIVE
NRBC # BLD AUTO: 0 K/UL
NRBC BLD AUTO-RTO: 0 /100 WBC
PH UR STRIP.AUTO: 6 [PH]
PHOSPHATE SERPL-MCNC: 3.6 MG/DL (ref 2.5–4.5)
PLATELET # BLD AUTO: 161 K/UL (ref 164–446)
PLATELET BLD QL SMEAR: NORMAL
PMV BLD AUTO: 10.1 FL (ref 9–12.9)
POTASSIUM SERPL-SCNC: 3.8 MMOL/L (ref 3.6–5.5)
PROT UR QL STRIP: 30 MG/DL
PROTHROMBIN TIME: 38.3 SEC (ref 12–14.6)
RBC # BLD AUTO: 3.78 M/UL (ref 4.7–6.1)
RBC # URNS HPF: ABNORMAL /HPF
RBC BLD AUTO: NORMAL
RBC UR QL AUTO: ABNORMAL
SODIUM SERPL-SCNC: 134 MMOL/L (ref 135–145)
SP GR UR STRIP.AUTO: 1.01
TROPONIN I SERPL-MCNC: 0.18 NG/ML (ref 0–0.04)
UROBILINOGEN UR STRIP.AUTO-MCNC: NORMAL MG/DL
WBC # BLD AUTO: 8.6 K/UL (ref 4.8–10.8)
WBC #/AREA URNS HPF: ABNORMAL /HPF

## 2017-08-01 PROCEDURE — 85610 PROTHROMBIN TIME: CPT

## 2017-08-01 PROCEDURE — 99226 PR SUBSEQUENT OBSERVATION CARE,LEVEL III: CPT | Performed by: INTERNAL MEDICINE

## 2017-08-01 PROCEDURE — 71020 DX-CHEST-2 VIEWS: CPT

## 2017-08-01 PROCEDURE — 80069 RENAL FUNCTION PANEL: CPT

## 2017-08-01 PROCEDURE — 81001 URINALYSIS AUTO W/SCOPE: CPT

## 2017-08-01 PROCEDURE — 84484 ASSAY OF TROPONIN QUANT: CPT

## 2017-08-01 PROCEDURE — 36415 COLL VENOUS BLD VENIPUNCTURE: CPT

## 2017-08-01 PROCEDURE — 85007 BL SMEAR W/DIFF WBC COUNT: CPT

## 2017-08-01 PROCEDURE — 700102 HCHG RX REV CODE 250 W/ 637 OVERRIDE(OP): Performed by: HOSPITALIST

## 2017-08-01 PROCEDURE — 85027 COMPLETE CBC AUTOMATED: CPT

## 2017-08-01 PROCEDURE — A9270 NON-COVERED ITEM OR SERVICE: HCPCS | Performed by: HOSPITALIST

## 2017-08-01 PROCEDURE — 82962 GLUCOSE BLOOD TEST: CPT

## 2017-08-01 PROCEDURE — G0378 HOSPITAL OBSERVATION PER HR: HCPCS

## 2017-08-01 RX ADMIN — TAMSULOSIN HYDROCHLORIDE 0.4 MG: 0.4 CAPSULE ORAL at 08:59

## 2017-08-01 RX ADMIN — METOPROLOL SUCCINATE 25 MG: 25 TABLET, EXTENDED RELEASE ORAL at 08:57

## 2017-08-01 RX ADMIN — FINASTERIDE 5 MG: 5 TABLET, FILM COATED ORAL at 08:57

## 2017-08-01 RX ADMIN — SPIRONOLACTONE 25 MG: 25 TABLET, FILM COATED ORAL at 08:59

## 2017-08-01 RX ADMIN — LEVOTHYROXINE SODIUM 100 MCG: 100 TABLET ORAL at 09:00

## 2017-08-01 RX ADMIN — LISINOPRIL 5 MG: 5 TABLET ORAL at 08:57

## 2017-08-01 RX ADMIN — FUROSEMIDE 80 MG: 40 TABLET ORAL at 08:59

## 2017-08-01 RX ADMIN — INSULIN LISPRO 2 UNITS: 100 INJECTION, SOLUTION INTRAVENOUS; SUBCUTANEOUS at 12:58

## 2017-08-01 RX ADMIN — STANDARDIZED SENNA CONCENTRATE AND DOCUSATE SODIUM 2 TABLET: 8.6; 5 TABLET, FILM COATED ORAL at 20:01

## 2017-08-01 RX ADMIN — INSULIN LISPRO 2 UNITS: 100 INJECTION, SOLUTION INTRAVENOUS; SUBCUTANEOUS at 20:05

## 2017-08-01 RX ADMIN — BETAMETHASONE DIPROPIONATE: 0.5 CREAM TOPICAL at 12:58

## 2017-08-01 ASSESSMENT — PAIN SCALES - GENERAL
PAINLEVEL_OUTOF10: 0
PAINLEVEL_OUTOF10: 0

## 2017-08-01 ASSESSMENT — CHA2DS2 SCORE
AGE 75 OR GREATER: YES
DIABETES: YES
PRIOR STROKE OR TIA OR THROMBOEMBOLISM: YES
HYPERTENSION: YES
VASCULAR DISEASE: NO
SEX: MALE
AGE 65 TO 74: NO
CHA2DS2 VASC SCORE: 7
CHF OR LEFT VENTRICULAR DYSFUNCTION: YES

## 2017-08-01 NOTE — ED NOTES
Chief Complaint   Patient presents with   • Syncope   • ALOC     Patient bib Care Flight from [place], apparently had Pacemaker changed last week, family reports patient hasn't been himself since. Family saw patient last night before bed, then found him down on floor confused this morning. VSS. ERP aware of arrival.

## 2017-08-01 NOTE — ED NOTES
Unable to interview pt at this time, no family at bedside.  Med Rec was completed on 7/27/17 by pharmacy here at [facility].

## 2017-08-01 NOTE — ED PROVIDER NOTES
ED Provider Note    Scribed for Pierre Oglesby M.D. by Bridget Brady. 7/31/2017  5:18 PM    Primary care provider: Jose Hayes M.D.  Means of arrival: med flight   History obtained from: patient   History limited by: altered mental status.       CHIEF COMPLAINT  Chief Complaint   Patient presents with   • Syncope   • ALOC       HPI  Monster Miramontes is a 89 y.o. male who presents to the Emergency Department for evaluation of altered level of consciousness onset unknown. The patient was brought to the ED, via care flight, from [place]. He was last seen normal last night before going to bed. He was found on his bedroom floor this morning by a family member.       HPI is limited secondary to patient's altered mental status. History was obtained from prior records.     Obtained and reviewed past medical records indicating the patient was recently discharged home two days ago secondary to syncopal event due to malfunction of his pacemaker which was replaced. The patient was found down today with facial trauma after having another syncopal event today. Patient noted to have an elevated troponin. His pacemaker is the [employer] Model. His referring records from earlier today indicated:   Troponin of 0.1  Hemoglobin 14      BUN 45   Creatinine 1.4   CK 5000   INR 3.2   Normal CT scan of the head with normal facial bones       REVIEW OF SYSTEMS  Pertinent positives include altered level of consciousness.   ROS is limited secondary to patient's altered mental status. C.       PAST MEDICAL HISTORY   has a past medical history of Unspecified hemorrhagic conditions; Cancer (CMS-[facility]) (2007); Diabetes; Hypertension; Congestive heart failure (CMS-HCC); Arrhythmia; Pacemaker; Atrial fibrillation (CMS-HCC); Breath shortness; Snoring; Stroke (CMS-[facility]) (2010); CATARACT; Heart murmur; and Unspecified disorder of thyroid.      SURGICAL HISTORY   has past surgical history that includes bladder biopsy with cystoscopy  "(7/3/08); trans urethral resection bladder (7/3/08); pacemaker insertion (2008); recovery (9/21/2012); and aortic valve replacement (9/25/2012).      SOCIAL HISTORY  Social History   Substance Use Topics   • Smoking status: Former Smoker -- 2.00 packs/day for 28 years     Types: Cigarettes     Quit date: 09/07/1972   • Smokeless tobacco: Current User     Types: Chew   • Alcohol Use: Yes      Comment: 3 drinks per day      History   Drug Use No       FAMILY HISTORY  None noted       CURRENT MEDICATIONS  Home Medications     Reviewed by Belkis Moreira R.N. (Registered Nurse) on 07/31/17 at 2145  Med List Status: Complete    Medication Last Dose Status    betamethasone dipropionate 0.05 % lotion <week Active    finasteride (PROSCAR) 5 MG TABS 7/30/2017 Active    furosemide (LASIX) 40 MG Tab 7/30/2017 Active    glimepiride (AMARYL) 2 MG TABS 7/30/2017 Active    levothyroxine (SYNTHROID) 100 MCG TABS 7/30/2017 Active    lisinopril (PRINIVIL) 5 MG Tab 7/30/2017 Active    metoprolol SR (TOPROL XL) 25 MG TABLET SR 24 HR 7/30/2017 Active    spironolactone (ALDACTONE) 25 MG TABS 7/30/2017 Active    tamsulosin (FLOMAX) 0.4 MG capsule 7/30/2017 Active    warfarin (COUMADIN) 5 MG TABS 7/30/2017 Active                ALLERGIES  Allergies   Allergen Reactions   • Clonidine Rash             PHYSICAL EXAM  VITAL SIGNS: BP 98/60 mmHg  Pulse 70  Temp(Src) 37 °C (98.6 °F)  Resp 22  Ht 1.905 m (6' 3\")  Wt 74.844 kg (165 lb)  BMI 20.62 kg/m2  SpO2 94%  Constitutional: Well developed, Well nourished, no distress, Non-toxic appearance.   HENT: Normocephalic, Periorbital edema with old abrasions, Abrasion to the right maxillary area, Bilateral external ears normal, Oropharynx moist, No oral exudates.   Eyes: PERRLA, EOMI, Conjunctiva normal, No discharge.   Neck: Supple, No stridor.   Lymphatic: No lymphadenopathy noted.   Cardiovascular: Normal heart rate, Normal rhythm.   Thorax & Lungs: No respiratory distress, No wheezing, " crackles to the bilateral bases.   Abdomen: Soft,  No masses, No pulsatile masses.   Skin: Warm, Dry, No erythema, No rash. Bandages to the bilateral superior lateral chest wall with surrounding ecchymosis. Large abrasion to the right shoulder. Large midline surgical scar.   Extremities: No cyanosis.   Musculoskeletal: No major deformities noted.  Intact distal pulses  Neurologic: Awake, alert, confused.         LABS  Results for orders placed or performed during the hospital encounter of 17   Troponin STAT   Result Value Ref Range    Troponin I 0.23 (H) 0.00 - 0.04 ng/mL   APTT   Result Value Ref Range    APTT 46.2 (H) 24.7 - 36.0 sec   PT/INR   Result Value Ref Range    PT 33.0 (H) 12.0 - 14.6 sec    INR 3.11 (H) 0.87 - 1.13   ACCU-CHEK GLUCOSE   Result Value Ref Range    Glucose - Accu-Ck 117 (H) 65 - 99 mg/dL   EKG (ER)   Result Value Ref Range    Report       [facility] Emergency Dept.    Test Date:  2017  Pt Name:    GEORGI SPENCER              Department: ER  MRN:        [id number]                      Room:       [facility]  Gender:     M                            Technician: [id number]  :        [id number]                   Requested By:ER TRIAGE PROTOCOL  Order #:    [id number]                    Reading MD:    Measurements  Intervals                                Axis  Rate:       85                           P:  CT:                                      QRS:        -67  QRSD:       164                          T:          106  QT:         460  QTc:        547    Interpretive Statements  INCOMPLETE ANALYSIS DUE TO MISSING DATA IN PRECORDIAL LEAD(S)  AFIB/FLUT AND V-PACED COMPLEXES  NO FURTHER RHYTHM ANALYSIS ATTEMPTED DUE TO PACED RHYTHM  RBBB AND LAFB  MISSING LEAD(S): V3  Compared to ECG 2017 05:57:02  Left anterior fascicular block now present  Right bundle-branch block now present     All labs reviewed by me.        COURSE & MEDICAL DECISION MAKING  Pertinent Labs &  Imaging studies reviewed. (See chart for details)    5:14 PM Obtained and reviewed past medical records indicating the patient was recently discharged home two days ago secondary to syncopal event due to malfunction of his pacemaker which was replaced. The patient was found down today with facial trauma after having another syncopal event today. Patient noted to have an elevated troponin. His pacemaker is the [employer] Model. His referring records from earlier today indicated:   Troponin of 0.1  Hemoglobin 14      BUN 45   Creatinine 1.4   CK 5000   INR 3.2   Normal CT scan of the head with normal facial bones     5:18 PM - Patient seen and examined at bedside. Patient will be treated with IV fluids. Ordered Troponin, APTT, INT and EKG to evaluate his symptoms.     5:24 PM Paged [employer] for pacemaker interrogation.     6:07 PM Consult with Dr. Sanderson who agrees to admit the patient.     6:15 PM Spoke with [employer] pacemaker tech in regards to changes in patient's pacemaker.       Decision Making:  Patient is syncope of uncertain etiology, have the patient's pacemaker interrogated that did not show any tachycardia arrhythmias, discussed the case with the hospitalist for admission to hospital.      DISPOSITION:  Patient will be admitted to Dr. Sanderson in guarded condition.      FINAL IMPRESSION  1. Syncope, unspecified syncope type    2. Closed head injury, initial encounter    3. Abrasion    4. Confusion         Bridget MORENO (More), am scribing for, and in the presence of, Pierre Oglesby M.D..  Electronically signed by: Bridget Brady (More), 7/31/2017  Pierre MORENO M.D. personally performed the services described in this documentation, as scribed by Bridget Brady in my presence, and it is both accurate and complete.    The note accurately reflects work and decisions made by me.  Pierre Oglesby  7/31/2017  9:53 PM

## 2017-08-01 NOTE — PROGRESS NOTES
[facility] Hospitalist Progress Note    Date of Service: 2017    Chief Complaint  89 y.o. male with T2DM, h/o chronic systolic CHF, hypothyroidism, h/o CVA and chronic afib, status post BiV pacemaker on chronic Coumadin, bioprosthetic aortic valve replacement for aortic stenosis, previously admitted at [facility] two days ago when he had his pacemaker changed by EP. He was then discharged. His family reported that the patient has not been himself since. They saw him the night before where he was in bed. In the morning if found him on the floor confused. Admitted for syncope. Interrogation of pacemaker showed intermittent non-capture, adjusted by rep during interrogation. Cards consulted.     Interval Problem Update  2017 - no overnight events. Remains hemodynamically stable and afebrile. Stable on 2L O2 NC. No leukocytosis. Hgb stable. Na 134. Crea Within his baseline at 1.20. Trop 0.23. Last echo showed EF 30%.       > Seen and examined. anwers questions apporpriately. No CP, SOB, N/V, abd pain. AOx3.       Consultants/Specialty  Cardiology/EP    Disposition  Monitor on telemetry        ROS     Pertinent positives/negatives as mentioned above.     A complete review of systems was done. All other systems were negative.      Physical Exam  Laboratory/Imaging   Hemodynamics  Temp (24hrs), Av.8 °C (98.2 °F), Min:36.6 °C (97.8 °F), Max:37 °C (98.6 °F)   Temperature: 36.6 °C (97.8 °F)  Pulse  Av  Min: 64  Max: 72 Heart Rate (Monitored): 70  Blood Pressure : (!) 98/68 mmHg, NIBP: (!) 96/60 mmHg      Respiratory      Respiration: 16, Pulse Oximetry: 99 %        RUL Breath Sounds: Clear, RML Breath Sounds: Diminished, RLL Breath Sounds: Diminished, WILY Breath Sounds: Clear, LLL Breath Sounds: Diminished    Fluids    Intake/Output Summary (Last 24 hours) at 17 0656  Last data filed at 17 0600   Gross per 24 hour   Intake    160 ml   Output    350 ml   Net   -190 ml       Nutrition  Orders Placed This  Encounter   Procedures   • Diet Order     Standing Status: Standing      Number of Occurrences: 1      Standing Expiration Date:      Order Specific Question:  Diet:     Answer:  Diabetic [3]     Order Specific Question:  Diet:     Answer:  Cardiac [6]     Physical Exam   Constitutional: He is oriented to person, place, and time. He appears well-developed. No distress.   HENT:   Head: Normocephalic and atraumatic.   Mouth/Throat: Oropharynx is clear and moist. No oropharyngeal exudate.   Eyes: EOM are normal. Pupils are equal, round, and reactive to light. Right eye exhibits no discharge. Left eye exhibits no discharge. No scleral icterus.   Neck: Normal range of motion. Neck supple. No thyromegaly present.   Cardiovascular: Normal rate.  Exam reveals no gallop and no friction rub.    No murmur heard.  Irregular rhythm   Pulmonary/Chest: Effort normal and breath sounds normal. He has no wheezes. He has no rales. He exhibits no tenderness.   (+) PPM on the right upper chest, with healing bruising.   Abdominal: Soft. Bowel sounds are normal. There is no tenderness. There is no rebound and no guarding.   Musculoskeletal: Normal range of motion. He exhibits no edema or tenderness.   Lymphadenopathy:     He has no cervical adenopathy.   Neurological: He is alert and oriented to person, place, and time.   Answers questions appropriately.    Skin: Skin is warm and dry. No rash noted. He is not diaphoretic. No erythema.   Psychiatric: His behavior is normal.   Flat affect   Vitals reviewed.      Recent Labs      08/01/17   0351   WBC  8.6   RBC  3.78*   HEMOGLOBIN  12.7*   HEMATOCRIT  36.2*   MCV  95.8   MCH  33.6*   MCHC  35.1   RDW  44.3   PLATELETCT  161*   MPV  10.1     Recent Labs      08/01/17   0351   SODIUM  134*   POTASSIUM  3.8   CHLORIDE  102   CO2  24   GLUCOSE  128*   BUN  46*   CREATININE  1.20   CALCIUM  8.6     Recent Labs      07/31/17   1810  08/01/17   0351   APTT  46.2*   --    INR  3.11*  3.76*                   Assessment/Plan     Syncope (present on admission)  Assessment & Plan  - unclear if really syncopized vs just fall. Unclear if intermittent capture of PPM contributed. S/p readjustment of PPM.   - await EP inputs. Cards following.   - continue to monitor on telemetry.  - PT/OT eval.     Cardiac pacemaker in situ (present on admission)  Assessment & Plan  - With intermittent non-capture. S/p re-adjustment during interrogation.   - Await further inputs from EP. Cardiology following.   - continue to monitor on telemetry.     Elevated troponin  Assessment & Plan  - likely demand ischemia. Peaked.   - Continue to monitor on telemetry.     Chronic congestive heart failure (CMS-HCC) (present on admission)  Assessment & Plan  - Not in acute failure.  - Continue furosemide, lisinopril, metoprolol, and spironolactone.    Chronic A-fib (CMS-HCC) (present on admission)  Assessment & Plan  - rate controlled. Continue metoprolol. Continue Coumadin per pharmacy dosing.   - continue to monitor on telemetry.     S/P AVR (aortic valve replacement) (present on admission)  Assessment & Plan  - Continue Coumadin.    Acquired circulating anticoagulants (CMS-HCC) (present on admission)  Assessment & Plan  - Continue Coumadin for Afib.     Hypothyroidism (present on admission)  Assessment & Plan  - TSH in range three days ago  - Continue synthroid.     CKD (chronic kidney disease), stage III (present on admission)  Assessment & Plan  - remains at baseline. Will continue to monitor.       Medications reviewed, Radiology images reviewed and Labs reviewed  Wasserman catheter: No Wasserman      DVT Prophylaxis: Warfarin (Coumadin)        Assessed for rehab: Patient was assess for and/or received rehabilitation services during this hospitalization

## 2017-08-01 NOTE — PROGRESS NOTES
Inpatient Anticoagulation Service Note    Date: 8/1/2017  Reason for Anticoagulation: Atrial Fibrillation   [id number] VASc Score: 7    Hemoglobin Value: 12.7  Hematocrit Value: 36.2  Lab Platelet Value: 161  Target INR: 2.0 to 3.0    INR from last 7 days     Date/Time INR Value    08/01/17 0351 (!)3.76    07/31/17 1810 (!)3.11        Dose from last 7 days     Date/Time Dose (mg)    08/01/17 1100 0    07/31/17 2135 5        Significant Interactions: Thyroid Medications, Other (Comments) (Spironolactone)  Bridge Therapy: No (Supratherapeutic)   Reversal Agent Administered: Not Applicable    Assessment and Plan:  88 yo male chronically anticoagulated with warfarin outpatient for h/o Afib with [id number] ~ 7. Per med rec, home dosing regimen is warfarin 7.5mg po on Mon, Wed, Fri and Warfarin 5mg po on all other days. INR slightly supratherapeutic upon admission and continues to trend upward with INR of 3.76 this morning after a dose of warfarin 5mg po last night.   >Plan is to hold warfarin dose x one tonight and continue to trend INRs daily until stabilized. Will likely resume warfarin at a reduced dosing regimen tomorrow.   >No new drug-drug interactions identified with inpatient medications, established interactions only with home medications: Levothyroxine and Venlafaxine     Education Material Provided?: No (Chronic warfarin patient)    Pharmacist suggested discharge dosing: Warfarin 5mg po daily      Evelina Reed, PHARMD

## 2017-08-01 NOTE — HEART FAILURE PROGRAM
"Cardiovascular Nurse Navigator () Progress Note:     This patient has a history of HF. However, per Dr. Sanderson's note 7/31, the HF is chronic and not in exacerbation at this time. Therefore, a seven day heart failure f/u appt is not currently indicated.    Should pt develop an exacerbation while inpatient, he will require a seven day f/u appt which can be achieved by placing a \"schedule heart failure follow up appointment\" order per protocol or by calling the hospital schedulers at [phone].     Thank you and please call with questions or concerns.  "

## 2017-08-01 NOTE — PROGRESS NOTES
Inpatient Anticoagulation Service Note    Date: 7/31/2017  Reason for Anticoagulation: Atrial Fibrillation        Target INR: 2.0 to 3.0    INR from last 7 days     Date/Time INR Value    07/31/17 1810 (!)3.11        Dose from last 7 days     Date/Time Dose (mg)    07/31/17 2135 5        Significant Interactions: Thyroid Medications  Bridge Therapy: No    Reversal Agent Administered: Not Applicable  Comments: Warfarin resumed from home for Afib.  H/H is stable and no signs of bleeding per chart review.  INR is supratherapeutic.  No new drug interactions.    Plan:  Give warfarin 5 mg tonight     Pharmacist suggested discharge dosing: Resume home dosing of Warfarin 7.5 mg Mo, We, Fri and Warfarin 5 mg all other days.       Vamsi El, PharmD

## 2017-08-01 NOTE — ASSESSMENT & PLAN NOTE
- Not in acute failure.  - Continue lower dose of furosemide and lower dose of aldactone.   - Unable to restart lisinopril, and metoprolol due to low BP causing syncopal episodes.

## 2017-08-01 NOTE — ED NOTES
The Medication Reconciliation process has been completed by interviewing the patient's son.    Allergies have been reviewed  Antibiotic use in 30 days - none    Home Pharmacy:  [facility] [place]

## 2017-08-01 NOTE — CARE PLAN
Problem: Safety  Goal: Will remain free from falls  Outcome: PROGRESSING AS EXPECTED  High fall risk protocol in place, call light in reach, frequent checks    Problem: Skin Integrity  Goal: Risk for impaired skin integrity will decrease  Outcome: PROGRESSING AS EXPECTED  Turn q2 hrs, enc po fluids, up for meals, moisture barriers as needed

## 2017-08-01 NOTE — PROGRESS NOTES
I have personally reviewed the device findings with the [facility] rep Amira Howard. There was non-capture of the LV lead on admission. The RV lead has excellent capture down to 0.5V and so the patient was never without RV pacing. The LV lead vector was changed and now has capture at 1.75 volts. The device function will be rechecked today as well. Site is uncomplicated . No need for repositioning of LV lead at this time.

## 2017-08-01 NOTE — CONSULTS
DATE OF CONSULTATION:  07/31/2017    REQUESTING PHYSICIAN:  Dr. Landry Sanderson.    REASON FOR CONSULTATION:  Malfunctioning biventricular pacemaker.    HISTORY OF PRESENT ILLNESS:  The patient is an 89-year-old  male with   multiple medical problems including chronic atrial fibrillation, chronic   anticoagulation, recent biventricular pacemaker implantation, bioprosthetic   aortic valve replacement for aortic stenosis, chronic systolic congestive   heart failure with an ejection fraction of 30%, TIA, bladder cancer, diabetes   mellitus, hypothyroidism, noncritical coronary artery disease, and   hypertension who was transferred from [place], [place] to [facility] for altered level of consciousness.    The patient cannot provide any medical history as he is confused and cannot recall   the events leading up to his current hospitalization. The medical history is   obtained from my conversation with Dr. Landry Sanderson and from the patient's   daughter and son who were at the bedside.    The patient had just been hospitalized at [facility]   between 07/27/2017 and 07/29/2017 after being admitted for a syncopal episode and was   found to have a lead fracture of his previous single-chamber pacemaker placed in 2010.    On 07/28/2017, the patient underwent right ventricular lead revision and pacemaker generator   change and insertion of a left ventricular lead for biventricular pacing by Dr. Ewa Davis.    The patient lives alone.  He was last seen last evening by his son after the   patient had had dinner.  The patient was subsequently found around 01:00 p.m.   this afternoon on the floor in his bathroom, confused, and profoundly weak.    There was no known loss of consciousness.    The patient suffered multiple abrasions including both shoulders and his left   leg.    I was told by Dr. Landry Sanderson that the patient's pacemaker device has been    interrogated and there was indication that it was not consistently capturing  and adjustments have been made.    ALLERGIES:  TO CLONIDINE.    MEDICATIONS PRIOR TO ADMISSION:  1.  Lasix 80 mg daily.  2.  Metoprolol 25 mg daily.  3.  Lisinopril 5 mg daily.  4.  Proscar 5 mg daily.  5.  Spironolactone 12.5 mg daily.  6.  Amaryl 2 mg daily.  7.  Synthroid 100 mcg daily.  8.  Flomax 0.5 mg daily.  9.  Warfarin 5 mg as directed.    PAST SURGICAL HISTORY:  1.  03/07/2008 TURP for bladder tumor.  2.  03/09/2008 TURP for bladder cancer resection.  3.  07/03/2008 repeat TURP.  4.  09/21/2012 coronary angiography demonstrating diffuse moderate coronary   artery disease.  Echocardiogram 09/07/2012, ejection fraction 25-30%, severe   aortic stenosis, peak gradient 63 mmHg, right ventricular pressure 55 mmHg.  5.  09/25/2012 bioprosthetic aortic valve replacement 23 mm for aortic   stenosis and left ventricular dysfunction.    PAST MEDICAL HISTORY:  1.  Bladder cancer.  2.  TIA 2010.  3.  Chronic atrial fibrillation. 2010 apparently diagnosed the time of his TIA.  4. Chronic anticoagulation since 2010.  5.  Cardiomyopathy, left ventricular ejection fraction 30%.  6.  Diabetes mellitus.  7.  Hypertension.  8. Chronic atrial fibrillation    FAMILY HISTORY:  No relevant significant family history for heart disease.    SOCIAL HISTORY:  As above.    PHYSICAL EXAMINATION:  VITAL SIGNS:  Blood pressure 107/64.  Pulse 70, temperature 98.6.  GENERAL:  The patient is elderly, cachectic, alert, but confused, hard of   hearing, can follow simple commands.  He can move all extremities to command.  HEENT:  Pupils are pinpoint.  There is no obvious facial asymmetry or head   trauma.  NECK:  Normal jugular venous pressure, 1+ carotid pulses.  CHEST:  Healed incisions and bandages both subclavian areas with pacemaker   generator right subclavian area.  Chest is symmetrical.  LUNGS:  No wheezes, rales, or rhonchi.  CARDIAC:  Regular rate and  rhythm with occasional extra beats and systolic   murmur.  ABDOMEN:  Flat, soft, nontender, no guarding.  EXTREMITIES:  No clubbing.  There is acrocyanosis.  No edema.  NEUROLOGIC:  As above.  SKIN:  Multiple ecchymoses and skin abrasions, particularly on both shoulders   and the left lateral lower leg.    EKG 07/21/2017, atrial fibrillation, ventricular paced rhythm, isolated PVCs.    LABORATORY DATA:  07/31/2017 troponin 0.23.    Chest x-ray pending.    Echocardiogram 07/28/2017, left ventricular ejection fraction 30%.  Peak   aortic valve gradient 39 mmHg and mean gradient 27 mmHg, moderate tricuspid   regurgitation, right ventricular pressure 25 mmHg.    ASSESSMENT:  1.  Ground level fall, cannot exclude a syncopal episode.  2.  Status post biventricular pacemaker placement with right ventricular lead   revision 07/28/2017 with current interrogation reportedly demonstrating   intermittent non-capture.  Uncertain as to whether this was a result of the   patient's fall or a contributing factor to the patient's clinical situation.  3.  Cardiomyopathy, left ventricular ejection fraction 30%.  4.  Aortic valve replacement, bioprosthetic 2012.  5.  Atrial fibrillation.  Chronic.  6.  Chronic anticoagulation.  7.  Multiple skin abrasions.  8.  Altered mental status.  9.  Generalized functional debility.    RECOMMENDATION:  1.  Admit to telemetry.  2.  Await remainder of the laboratory tests.  3.  EP consultation for further reevaluation of the biventricular pacemaker   device.  4.  The patient would probably benefit long-term from a structured residential facility.       ____________________________________     MD CHRISSY HOOVER / BIN    DD:  07/31/2017 20:02:09  DT:  07/31/2017 20:47:40    D#:  [id number]  Job#:  [id number]

## 2017-08-01 NOTE — H&P
Hospital Medicine History and Physical      Date of Service  7/31/2017    Chief Complaint  Chief Complaint   Patient presents with   • Syncope   • ALOC       History of Presenting Illness  Fe is a 89 y.o. male w/h/o hypothyroidism, A. fib status post pacemaker on chronic Coumadin, diabetes who presents with syncope. Patient was previously admitted at [facility] two days ago when he had his pacemaker changed by EP. He was then discharged. His family reported that the patient has not been himself since. They saw him the night before where he was in bed. In the morning if found him on the floor confused. Thus he was brought over by [facility].    Primary Care Physician  Jose Hayes M.D.    Outpatient EP  Dr. Davis    Code Status  Full code per patient    Review of Systems  Review of Systems   Constitutional: Negative for fever and chills.   Respiratory: Negative for cough, shortness of breath and wheezing.    Cardiovascular: Negative for chest pain.   Gastrointestinal: Negative for nausea, vomiting, diarrhea and constipation.   Neurological: Negative for headaches.     Please see HPI, all other systems were reviewed and are negative (AMA/CMS criteria)     Past Medical History  Past Medical History   Diagnosis Date   • Unspecified hemorrhagic conditions      takes warfarin   • Cancer (CMS-HCC) 2007     bladder   • Diabetes      oral medication   • Hypertension    • Congestive heart failure (CMS-HCC)    • Arrhythmia    • Pacemaker    • Atrial fibrillation (CMS-HCC)    • Breath shortness    • Snoring    • Stroke (CMS-HCC) 2010   • CATARACT      removed   • Heart murmur    • Unspecified disorder of thyroid      hypothyroid       Surgical History  Past Surgical History   Procedure Laterality Date   • Bladder biopsy with cystoscopy  7/3/08     Performed by ANDRESSA HANSON at SURGERY [facility] ORS   • Trans urethral resection bladder  7/3/08     Performed by ANDRESSA HANSON at SURGERY [facility] ORS   •  Pacemaker insertion     • Recovery  2012     Performed by Cath-Recovery Surgery at SURGERY SAME DAY [facility] ORS   • Aortic valve replacement  2012     Performed by Cong Marinelli M.D. at SURGERY [facility] ORS       Medications  No current facility-administered medications on file prior to encounter.     Current Outpatient Prescriptions on File Prior to Encounter   Medication Sig Dispense Refill   • furosemide (LASIX) 40 MG Tab Take 80 mg by mouth every day.     • metoprolol SR (TOPROL XL) 25 MG TABLET SR 24 HR Take 1 Tab by mouth every day. 90 Tab 3   • betamethasone dipropionate 0.05 % lotion Apply 1 Application to affected area(s) every bedtime.  0   • lisinopril (PRINIVIL) 5 MG Tab Take 1 Tab by mouth every day. 30 Tab 5   • finasteride (PROSCAR) 5 MG TABS Take 5 mg by mouth every day.     • spironolactone (ALDACTONE) 25 MG TABS Take 12.5 mg by mouth every day.     • glimepiride (AMARYL) 2 MG TABS Take 2 mg by mouth every morning.     • levothyroxine (SYNTHROID) 100 MCG TABS Take 100 mcg by mouth every day.     • tamsulosin (FLOMAX) 0.4 MG capsule Take 0.4 mg by mouth ONE-HALF HOUR AFTER BREAKFAST.     • warfarin (COUMADIN) 5 MG TABS Take 5-7.5 mg by mouth every day. 7.5mg on Monday/Wednesday/Friday, 5mg on all other days       Family History  No family history on file.  Patient does not know    Social History  Social History   Substance Use Topics   • Smoking status: Former Smoker -- 2.00 packs/day for 28 years     Types: Cigarettes     Quit date: 1972   • Smokeless tobacco: Current User     Types: Chew   • Alcohol Use: Yes      Comment: 3 drinks per day       Allergies  Allergies   Allergen Reactions   • Clonidine Rash              Physical Exam  Laboratory   Hemodynamics  Temp (24hrs), Av °C (98.6 °F), Min:37 °C (98.6 °F), Max:37 °C (98.6 °F)   Temperature: 37 °C (98.6 °F)  Pulse  Av.3  Min: 64  Max: 72 Heart Rate (Monitored): 70  Blood Pressure : (!) 98/60 mmHg, NIBP: (!)  96/60 mmHg      Respiratory      Respiration: (!) 25, Pulse Oximetry: 98 %             Physical Exam   Constitutional: He appears well-developed.   Thin, frail   HENT:   Head: Normocephalic.   Eyes: Conjunctivae are normal.   Cardiovascular: Normal rate.    Pulmonary/Chest: No respiratory distress. He has no wheezes. He has no rales.   Abdominal: Soft. He exhibits no distension.   Musculoskeletal: He exhibits no edema.   Skin: Skin is warm. No erythema.   Chest scar, diffuse bruising   Psychiatric: He has a normal mood and affect.               No results for input(s): ALTSGPT, ASTSGOT, ALKPHOSPHAT, TBILIRUBIN, DBILIRUBIN, GAMMAGT, AMYLASE, LIPASE, ALB, PREALBUMIN, GLUCOSE in the last 72 hours.  Recent Labs      07/29/17   0145  07/31/17   1810   APTT   --   46.2*   INR  2.96*  3.11*             Lab Results   Component Value Date    TROPONINI 0.23* 07/31/2017       Imaging  OUTSIDE IMAGES-DX CHEST   Final Result        EKG  per my independant read:  QTc: 547, HR: 85, A. fib, mostly paced but has also several unpaced beats   Assessment/Plan     I anticipate this patient is appropriate for observation status at this time.    Chronic congestive heart failure (CMS-HCC) (present on admission)  Assessment & Plan  - Not in acute exacerbation  - Continue furosemide, lisinopril, metoprolol, spironolactone    A-fib (CMS-HCC) (present on admission)  Assessment & Plan  - Continue Coumadin  - Continue metoprolol    Cardiac pacemaker in situ (present on admission)  Assessment & Plan  - With intermittent non-capture  - Adjusted by pacemaker rep during interrogation  - Cardiologist Dr. Schaeffer consulted    S/P AVR (aortic valve replacement) (present on admission)  Assessment & Plan  Continue Coumadin    Acquired circulating anticoagulants (CMS-HCC) (present on admission)  Assessment & Plan  - Continue Coumadin    Syncope (present on admission)  Assessment & Plan  - Versus fall at home, unclear  - Jenaro Schaeffer  consulted    Elevated troponin  Assessment & Plan  - Possibly demand ischemia, continue to trend  - Monitor on telemetry    Hypothyroidism (present on admission)  Assessment & Plan  - TSH in range three days ago  - Continue levothyroxine        Prophylaxis:  warfarin

## 2017-08-01 NOTE — PROGRESS NOTES
2 RN skin check with Liliana BARLOW. Bruising bilateral arms and chest. 2 surgical wounds on left and right chest steri-strips and tegaderm over both. Left hip, left lateral knee and left shin skin tears with tegaderm. Right forearm multiple small tears. Right knee and right wrist area old wounds with scabs. Coccyx red blanching.

## 2017-08-01 NOTE — ASSESSMENT & PLAN NOTE
- likely more related to low BP. Unclear if intermittent capture of PPM contributed. S/p readjustment of PPM.   - lisinopril and metoprolol discontinued. Continue lower dose of aldactone and lasix. Continue close BP monitoring.   - continue to monitor on telemetry.  - will need SNF placement. Continue PT/OT while in-house.

## 2017-08-02 PROBLEM — K59.00 CONSTIPATION: Status: ACTIVE | Noted: 2017-08-02

## 2017-08-02 LAB
GLUCOSE BLD-MCNC: 129 MG/DL (ref 65–99)
GLUCOSE BLD-MCNC: 139 MG/DL (ref 65–99)
GLUCOSE BLD-MCNC: 183 MG/DL (ref 65–99)
INR PPP: 4.92 (ref 0.87–1.13)
PROTHROMBIN TIME: 47.3 SEC (ref 12–14.6)

## 2017-08-02 PROCEDURE — A9270 NON-COVERED ITEM OR SERVICE: HCPCS | Performed by: INTERNAL MEDICINE

## 2017-08-02 PROCEDURE — 85610 PROTHROMBIN TIME: CPT

## 2017-08-02 PROCEDURE — 99226 PR SUBSEQUENT OBSERVATION CARE,LEVEL III: CPT | Performed by: INTERNAL MEDICINE

## 2017-08-02 PROCEDURE — 82962 GLUCOSE BLOOD TEST: CPT | Mod: 91

## 2017-08-02 PROCEDURE — 700105 HCHG RX REV CODE 258: Performed by: INTERNAL MEDICINE

## 2017-08-02 PROCEDURE — 700102 HCHG RX REV CODE 250 W/ 637 OVERRIDE(OP): Performed by: HOSPITALIST

## 2017-08-02 PROCEDURE — G0378 HOSPITAL OBSERVATION PER HR: HCPCS

## 2017-08-02 PROCEDURE — 36415 COLL VENOUS BLD VENIPUNCTURE: CPT

## 2017-08-02 PROCEDURE — G8988 SELF CARE GOAL STATUS: HCPCS | Mod: CI

## 2017-08-02 PROCEDURE — 302128 INFUSION PUMP W/POLE: Performed by: INTERNAL MEDICINE

## 2017-08-02 PROCEDURE — 97166 OT EVAL MOD COMPLEX 45 MIN: CPT

## 2017-08-02 PROCEDURE — G8987 SELF CARE CURRENT STATUS: HCPCS | Mod: CJ

## 2017-08-02 PROCEDURE — 700102 HCHG RX REV CODE 250 W/ 637 OVERRIDE(OP): Performed by: INTERNAL MEDICINE

## 2017-08-02 PROCEDURE — A9270 NON-COVERED ITEM OR SERVICE: HCPCS | Performed by: HOSPITALIST

## 2017-08-02 RX ORDER — SODIUM CHLORIDE 9 MG/ML
250 INJECTION, SOLUTION INTRAVENOUS ONCE
Status: COMPLETED | OUTPATIENT
Start: 2017-08-02 | End: 2017-08-02

## 2017-08-02 RX ORDER — SPIRONOLACTONE 25 MG/1
12.5 TABLET ORAL DAILY
Status: DISCONTINUED | OUTPATIENT
Start: 2017-08-03 | End: 2017-08-07 | Stop reason: HOSPADM

## 2017-08-02 RX ORDER — FUROSEMIDE 40 MG/1
40 TABLET ORAL DAILY
Status: DISCONTINUED | OUTPATIENT
Start: 2017-08-03 | End: 2017-08-07 | Stop reason: HOSPADM

## 2017-08-02 RX ORDER — POLYETHYLENE GLYCOL 3350 17 G/17G
1 POWDER, FOR SOLUTION ORAL DAILY
Status: DISCONTINUED | OUTPATIENT
Start: 2017-08-02 | End: 2017-08-07 | Stop reason: HOSPADM

## 2017-08-02 RX ADMIN — INSULIN LISPRO 2 UNITS: 100 INJECTION, SOLUTION INTRAVENOUS; SUBCUTANEOUS at 22:08

## 2017-08-02 RX ADMIN — POLYETHYLENE GLYCOL 3350 1 PACKET: 17 POWDER, FOR SOLUTION ORAL at 12:43

## 2017-08-02 RX ADMIN — BETAMETHASONE DIPROPIONATE: 0.5 CREAM TOPICAL at 12:43

## 2017-08-02 RX ADMIN — STANDARDIZED SENNA CONCENTRATE AND DOCUSATE SODIUM 2 TABLET: 8.6; 5 TABLET, FILM COATED ORAL at 11:40

## 2017-08-02 RX ADMIN — INSULIN LISPRO 2 UNITS: 100 INJECTION, SOLUTION INTRAVENOUS; SUBCUTANEOUS at 11:00

## 2017-08-02 RX ADMIN — FINASTERIDE 5 MG: 5 TABLET, FILM COATED ORAL at 11:40

## 2017-08-02 RX ADMIN — LEVOTHYROXINE SODIUM 100 MCG: 100 TABLET ORAL at 11:41

## 2017-08-02 RX ADMIN — SODIUM CHLORIDE 250 ML: 9 INJECTION, SOLUTION INTRAVENOUS at 12:43

## 2017-08-02 RX ADMIN — TAMSULOSIN HYDROCHLORIDE 0.4 MG: 0.4 CAPSULE ORAL at 11:40

## 2017-08-02 ASSESSMENT — COGNITIVE AND FUNCTIONAL STATUS - GENERAL
DAILY ACTIVITIY SCORE: 15
EATING MEALS: A LITTLE
TOILETING: A LOT
DRESSING REGULAR LOWER BODY CLOTHING: A LOT
DRESSING REGULAR UPPER BODY CLOTHING: A LITTLE
HELP NEEDED FOR BATHING: A LOT
PERSONAL GROOMING: A LITTLE
SUGGESTED CMS G CODE MODIFIER DAILY ACTIVITY: CK

## 2017-08-02 ASSESSMENT — ACTIVITIES OF DAILY LIVING (ADL): TOILETING: INDEPENDENT

## 2017-08-02 ASSESSMENT — PAIN SCALES - GENERAL: PAINLEVEL_OUTOF10: 0

## 2017-08-02 NOTE — PROGRESS NOTES
Inpatient Anticoagulation Service Note    Date: 8/2/2017  Reason for Anticoagulation: Atrial Fibrillation   [id number] VASc Score: 7    Hemoglobin Value: 12.7  Hematocrit Value: 36.2  Lab Platelet Value: 161  Target INR: 2.0 to 3.0    INR from last 7 days     Date/Time INR Value    08/02/17 0140 (!)4.92    08/01/17 0351 (!)3.76    07/31/17 1810 (!)3.11        Dose from last 7 days     Date/Time Dose (mg)    08/02/17 1100 0    08/01/17 1100 0    07/31/17 2135 5        Significant Interactions: Thyroid Medications, Other (Comments) (Spironolactone)  Bridge Therapy: No (INR supratherapeutic)   Reversal Agent Administered: Not Applicable    Assessment and Plan:  89 yo male chronically anticoagulated with warfarin outpatient for h/o Afib with [id number] ~ 7. Per med rec, home dosing regimen is warfarin 7.5mg po on Mon, Wed, Fri and Warfarin 5mg po on all other days. INR remains supratherapeutic with a continued trend upward today from 3.76 to 4.92.   >Plan is to continue to hold warfarin until INR is closer to 3 and trend INRs daily until stabilized.   >No new drug-drug interactions identified with inpatient medications, established interactions only with home medications: Levothyroxine and Venlafaxine     Education Material Provided?: No (Chronic warfarin patient)    Pharmacist suggested discharge dosing: Warfarin 5mg po daily      Evelina Reed, PHARMD

## 2017-08-02 NOTE — THERAPY
"Occupational Therapy Evaluation completed.   Functional Status: Pt seen today for OT eval. OK to work with pt per RN. Pt without c/o dizziness during session. Pt very pleasant and cooperative, very [place]. Pt requires Mahogany for bedmobility. Mahogany sit<>stand with HHA. ModA to don/doff socks. Mahogany HHA for pt to amb to chair in room. Pt completed grooming with setup. Pt's BP at end of session sitting is 137/73. Pt limited by weakness, fatigue, and impaired balance which impacts independence in ADLs and functional mobility.  Plan of Care: Will benefit from Occupational Therapy 3 times per week  Discharge Recommendations:  Equipment: Will Continue to Assess for Equipment Needs. Post-acute therapy undetermined - depends on progress made.    See \"Rehab Therapy-Acute\" Patient Summary Report for complete documentation.    "

## 2017-08-02 NOTE — PROGRESS NOTES
[facility] Hospitalist Progress Note    Date of Service: 2017    Chief Complaint  89 y.o. male with T2DM, h/o chronic systolic CHF, hypothyroidism, h/o CVA and chronic afib, status post BiV pacemaker on chronic Coumadin, bioprosthetic aortic valve replacement for aortic stenosis, previously admitted at [facility] two days ago when he had his pacemaker changed by EP. He was then discharged. His family reported that the patient has not been himself since. They saw him the night before where he was in bed. In the morning if found him on the floor confused. Admitted for syncope. Interrogation of pacemaker showed intermittent non-capture, adjusted by rep during interrogation. Cards consulted.  Last echo showed EF 30%.    Interval Problem Update  2017 - uneventful night. VSS. Afebrile. Saturating well on RA. INR 4.92. EP saw pt, LV lead vector was changed and now has capture at 1.75 volts, RV lead has excellent capture down to 0.5V. (+) occasional PVC's on telemetry.     > Seen and examined. Less confused. Denied any CP, SOB, nausea, vomiting, abd pain. (+) constipated.       Consultants/Specialty  Cardiology/EP    Disposition  Monitor on telemetry. Await PT/OT eval.         ROS     Pertinent positives/negatives as mentioned above.     A complete review of systems was done. All other systems were negative.      Physical Exam  Laboratory/Imaging   Hemodynamics  Temp (24hrs), Av.4 °C (97.6 °F), Min:36.1 °C (96.9 °F), Max:37 °C (98.6 °F)   Temperature: 37 °C (98.6 °F)  Pulse  Av.5  Min: 64  Max: 73    Blood Pressure : (!) 96/58 mmHg      Respiratory      Respiration: 15, Pulse Oximetry: 98 %        RUL Breath Sounds: Clear, RML Breath Sounds: Diminished, RLL Breath Sounds: Diminished, WILY Breath Sounds: Clear, LLL Breath Sounds: Diminished    Fluids    Intake/Output Summary (Last 24 hours) at 17 0700  Last data filed at 17 0306   Gross per 24 hour   Intake    240 ml   Output   1200 ml   Net   -960 ml        Nutrition  Orders Placed This Encounter   Procedures   • DIET ORDER     Standing Status: Standing      Number of Occurrences: 1      Standing Expiration Date:      Order Specific Question:  Diet:     Answer:  Regular [1]     Order Specific Question:  Texture/Fiber modifications:     Answer:  Dysphagia 3(Mechanical Soft)specify fluid consistency(question 6) [3]     Physical Exam   Constitutional: He is oriented to person, place, and time. He appears well-developed. No distress.   HENT:   Head: Normocephalic and atraumatic.   Mouth/Throat: Oropharynx is clear and moist. No oropharyngeal exudate.   Eyes: EOM are normal. Pupils are equal, round, and reactive to light. Right eye exhibits no discharge. Left eye exhibits no discharge. No scleral icterus.   Neck: Normal range of motion. Neck supple. No thyromegaly present.   Cardiovascular: Normal rate.  Exam reveals no gallop and no friction rub.    No murmur heard.  Irregular rhythm   Pulmonary/Chest: Effort normal and breath sounds normal. He has no wheezes. He has no rales. He exhibits no tenderness.   (+) PPM on the right upper chest, with healing bruising.   Abdominal: Soft. Bowel sounds are normal. There is no tenderness. There is no rebound and no guarding.   Musculoskeletal: Normal range of motion. He exhibits no edema or tenderness.   Lymphadenopathy:     He has no cervical adenopathy.   Neurological: He is alert and oriented to person, place, and time.   Answers questions appropriately. Less confused.   Skin: Skin is warm and dry. No rash noted. He is not diaphoretic. No erythema.   Psychiatric: His behavior is normal.   Flat affect   Vitals reviewed.      Recent Labs      08/01/17   0351   WBC  8.6   RBC  3.78*   HEMOGLOBIN  12.7*   HEMATOCRIT  36.2*   MCV  95.8   MCH  33.6*   MCHC  35.1   RDW  44.3   PLATELETCT  161*   MPV  10.1     Recent Labs      08/01/17   0351   SODIUM  134*   POTASSIUM  3.8   CHLORIDE  102   CO2  24   GLUCOSE  128*   BUN  46*    CREATININE  1.20   CALCIUM  8.6     Recent Labs      07/31/17   1810  08/01/17   0351  08/02/17   0140   APTT  46.2*   --    --    INR  3.11*  3.76*  4.92*                  Assessment/Plan     Syncope (present on admission)  Assessment & Plan  - unclear if really syncopized vs just fall. Unclear if intermittent capture of PPM contributed. S/p readjustment of PPM. BP soft as well, which could have contributed to this.   - Cards following. PPM readjusted.   - decrease aldactone to home dose at 12.5mg daily. Decrease lasix to 40mg daily.   - continue to monitor on telemetry.  - PT/OT eval.     Cardiac pacemaker in situ (present on admission)  Assessment & Plan  - With intermittent non-capture. S/p re-adjustment during interrogation.   - Await further inputs and PPM recheck from EP. Cardiology following.   - continue to monitor on telemetry.     Elevated troponin (present on admission)  Assessment & Plan  - likely demand ischemia. Peaked.   - Continue to monitor on telemetry.     Constipation (present on admission)  Assessment & Plan  - start scheduled miralax, and continue bowel protocol.     Chronic congestive heart failure (CMS-HCC) (present on admission)  Assessment & Plan  - Not in acute failure.  - Hold furosemide, lisinopril, metoprolol, and spironolactone this morning as BP low. Cut down on aldactone to 12.5mg daily (decreased by cardiology in outpatient), and decrease dose of lasix to 40mg daily.     Chronic A-fib (CMS-HCC) (present on admission)  Assessment & Plan  - rate controlled. Continue metoprolol. Continue Coumadin per pharmacy dosing.   - continue to monitor on telemetry.     S/P AVR (aortic valve replacement) (present on admission)  Assessment & Plan  - Continue Coumadin.    Acquired circulating anticoagulants (CMS-HCC) (present on admission)  Assessment & Plan  - Continue Coumadin for Afib.     Hypothyroidism (present on admission)  Assessment & Plan  - TSH in range three days ago  - Continue  synthroid.     CKD (chronic kidney disease), stage III (present on admission)  Assessment & Plan  - remains at baseline. Will continue to monitor.       Medications reviewed, Radiology images reviewed and Labs reviewed  Wasserman catheter: No Wasserman      DVT Prophylaxis: Warfarin (Coumadin)        Assessed for rehab: Patient was assess for and/or received rehabilitation services during this hospitalization

## 2017-08-02 NOTE — WOUND TEAM
"[facility]  Inpatient Services  Initial Wound and Skin Care Evaluation    Admission Date:   07/31/2017.  HPI, PMH, SH: Reviewed  Unit where seen by Wound Team: T7.    WOUND CONSULT RELATED TO: Sacrum, left leg.    SUBJECTIVE:  \"Do what you need to.\"    Self Report / Pain Level: 0/10.    OBJECTIVE: Agreeable, pleasant.     WOUND TYPE, LOCATION, CHARACTERISTICS (Pressure ulcers: location, stage, POA or date identified)    Location and type of wound: Left upper, lateral thigh: Abrasion.        Periwound:    Slight erythema.   Drainage:     Minimal, yellow.   Tissue Type and %:    100% pink/yellow (not slough).   Wound Edges:    Attached.  Odor:      None.  Exposed structure(s):  None.  S&S of Infection:   None.        Measurements:   08/02/2017.  Length:     1.8 cm  Width:      4.9 cm  Depth:    0 cm    Location and type of wound: Left lateral lower leg and knee: Abrasions.        Periwound:    Slight erythema.   Drainage:     Minimal, yellow.  Tissue Type and %:    100% pink/yellow (not slough).   Wound Edges:    Attached.  Odor:      None.  Exposed structure(s):  None.   S&S of Infection:   None.      Measurements:   08/02/2017  Length:     9 cm  Width:      4.2 cm  Depth:    0 cm    Location and type of wound: Lower, bilateral sacrum: Moisture associated partial thickness breakdown.        Periwound:     Blanching, dark red.  Drainage:     None.  Tissue Type and %:    100% red.  Wound Edges:    Attached.  Odor:      None.  Exposed structure(s):  None.  S&S of Infection:   None.       Measurements:   08/02/2017.  Length:     3 cm  Width:      1.5 cm  Depth:    0 cm    INTERVENTIONS BY WOUND TEAM: Patient up in chair, assisted him to standing. Sacrococcygeal area assessed. Area is dark red, but briskly blanching. To the right of the coccyx there is a blanching area that is lighter in color, most likely scar tissue. Bilateral lower sacrum or medial upper buttocks that are flat bullae. Due to location " and the fact that patient is incontinent of urine, these are determined to be related to moisture. Abrasions to left leg cleaned with NS, gently wiped with gauze, and dressed with yellow petrolatum gauze and adhesive silicone foam. Dressing care order placed, as well as a skin care order including barrier cream to buttocks.  Nursing to change dressings to left leg daily.       Interdisciplinary consultation: Patient and nursing.     EVALUATION: Yellow petrolatum gauze helps to manage excess moisture while maintaining optimal moisture in wound bed for healing.     Factors affecting wound healing: Age, trauma, moisture.   Goals: Wounds will decrease by 3% per week.     NURSING PLAN OF CARE ORDERS (X):    Dressing changes: See Dressing Care orders: X  Skin care: See Skin Care orders: X  Rectal tube care: See Rectal Tube Care orders:   Other orders:    RSKIN: CURRENT (X) ORDERED (O)  Q shift Scar:  X  Q shift pressure point assessments:  X  Pressure redistribution mattress  X with waffle overlay.      MAGDA      Bariatric MAGDA      Bariatric foam        Heel float boots       Heels floated on pillows   X   Barrier wipes      Barrier Cream   O   Barrier paste      Sacral silicone dressing      Silicone O2 tubing      Anchorfast      Trach with Optifoam split foam       Waffle cushion  X    Rectal tube or BMS      Antifungal tx    Turn q 2 hours   O  Up to chair     Ambulate   PT/OT     Dietician      PO X   TF   TPN     PVN    NPO   # days   Other       WOUND TEAM PLAN OF CARE (X):   NPWT change 3 x week:        Dressing changes by wound team:       Follow up as needed: X     Other (explain):    Anticipated discharge plans (X):  TBD  SNF:            Home Care:           Outpatient Wound Center:            Self Care:            Other:

## 2017-08-02 NOTE — CARE PLAN
Problem: Safety  Goal: Will remain free from falls  Outcome: PROGRESSING AS EXPECTED  High fall risk protocol in place, call light in reach, frequent checks    Problem: Skin Integrity  Goal: Risk for impaired skin integrity will decrease  Outcome: PROGRESSING AS EXPECTED  Turn q2 hrs, enc po fluids, up for meals, moisture barriers as needed, float heels, waffle matresses, assess each shift and prn

## 2017-08-02 NOTE — PROGRESS NOTES
Cardiology Progress Note               Author: Cameron Rorina Date & Time created: 2017  12:51 PM     Interval History:      Consultation for non capture of LV lead    Admitted with: Transferred from [facility] in [facility] with altered level of consciousness, ground-level fall    History of chronic A. fib (), on chronic anticoagulation, st jewel BIV AICD,  bioprosthetic aortic valve replacement for aortic stenosis (12), chronic systolic congestive heart failure, EF 30, TIA (), bladder cancer (bladder cancer resection ), diabetes, hypothyroid, noncritical CAD, hypertension    Labs reviewed  Na, K, CR stable     BP = 95/53  HR = 74 paced        Echocardiogram 17, LVEF 30%, global hypokinesis, grade 3-4 diastolic dysfunction, known bioprosthetic aortic valve with increased gradient, peak 39, mean 27, moderate TR, severely dilated left atrium, left to right shunt, severely dilated right atrium, severely dilated RV, reduced RV systolic function        17, underwent RV lead revision and pacemaker generator change and insertion of LV lead for BIV pacing          Review of Systems   Unable to perform ROS      Physical Exam   HENT:   Head: Normocephalic.   Eyes: Conjunctivae are normal.   Neck: No JVD present. No thyromegaly present.   Cardiovascular:   Paced rhythm    Pulmonary/Chest: He has no wheezes.   Abdominal: Soft.   Musculoskeletal: He exhibits no edema.   Skin: Skin is warm and dry.       Hemodynamics:  Temp (24hrs), Av.4 °C (97.6 °F), Min:36.1 °C (96.9 °F), Max:37 °C (98.6 °F)  Temperature: 36.1 °C (96.9 °F)  Pulse  Av.9  Min: 64  Max: 74   Blood Pressure : (!) 95/53 mmHg     Respiratory:    Respiration: 20, Pulse Oximetry: 100 %        RUL Breath Sounds: Clear, RML Breath Sounds: Diminished, RLL Breath Sounds: Diminished, WILY Breath Sounds: Clear, LLL Breath Sounds: Diminished  Fluids:     Weight: 74.4 kg (164 lb 0.4 oz)  GI/Nutrition:  Orders Placed This  Encounter   Procedures   • DIET ORDER     Standing Status: Standing      Number of Occurrences: 1      Standing Expiration Date:      Order Specific Question:  Diet:     Answer:  Regular [1]     Order Specific Question:  Texture/Fiber modifications:     Answer:  Dysphagia 3(Mechanical Soft)specify fluid consistency(question 6) [3]     Lab Results:  Recent Labs      08/01/17   0351   WBC  8.6   RBC  3.78*   HEMOGLOBIN  12.7*   HEMATOCRIT  36.2*   MCV  95.8   MCH  33.6*   MCHC  35.1   RDW  44.3   PLATELETCT  161*   MPV  10.1     Recent Labs      08/01/17   0351   SODIUM  134*   POTASSIUM  3.8   CHLORIDE  102   CO2  24   GLUCOSE  128*   BUN  46*   CREATININE  1.20   CALCIUM  8.6     Recent Labs      07/31/17   1810  08/01/17   0351  08/02/17   0140   APTT  46.2*   --    --    INR  3.11*  3.76*  4.92*         Recent Labs      07/31/17   1810  07/31/17   2243  08/01/17   0351   TROPONINI  0.23*  0.21*  0.18*             Medical Decision Making, by Problem:  Active Hospital Problems    Diagnosis   • Syncope [R55]   • Constipation [K59.00]   • Elevated troponin [R74.8]   • Cardiac pacemaker in situ [Z95.0]   • CKD (chronic kidney disease), stage III [N18.3]   • Hypothyroidism [E03.9]   • Acquired circulating anticoagulants (CMS-[facility]) [D68.318]   • S/P AVR (aortic valve replacement) [Z95.2]   • Chronic A-fib (CMS-HCC) [I48.91]   • Chronic congestive heart failure (CMS-[facility]) [I50.9]       Plan:  Presented with ground-level fall and confusion (cannot exclude syncope)    Device was interrogated, patient was never without RV pacing, so above symptoms not secondary to pacemaker dysfunction    His medications at home finasteride 5, Lasix 80, lisinopril 5, Toprol-XL 25, spironolactone 12.5, Flomax 0.4, hypotension can be a contributing factor ( although he has been on this regimen for awhile and tolerating well, perhaps need to cut back on current meds with  Known aortic valve with increased ingredient     Current blood pressure  85-95/50, lisinopril, Lasix, spironolactone, and Toprol-XL were held    We'll discuss with Dr. Collado to reevaluate current medical regimen    No rhythm issues overnight    INR=4.92 on Coumadin    Receiving IV fluids at 150 cc /hr, will stop when SBP ~ 95    Medications reviewed and Labs reviewed

## 2017-08-03 LAB
GLUCOSE BLD-MCNC: 140 MG/DL (ref 65–99)
GLUCOSE BLD-MCNC: 169 MG/DL (ref 65–99)
GLUCOSE BLD-MCNC: 212 MG/DL (ref 65–99)
GLUCOSE BLD-MCNC: 92 MG/DL (ref 65–99)
INR PPP: 3.88 (ref 0.87–1.13)
PROTHROMBIN TIME: 39.3 SEC (ref 12–14.6)

## 2017-08-03 PROCEDURE — A9270 NON-COVERED ITEM OR SERVICE: HCPCS | Performed by: INTERNAL MEDICINE

## 2017-08-03 PROCEDURE — 85610 PROTHROMBIN TIME: CPT

## 2017-08-03 PROCEDURE — 700102 HCHG RX REV CODE 250 W/ 637 OVERRIDE(OP)

## 2017-08-03 PROCEDURE — A9270 NON-COVERED ITEM OR SERVICE: HCPCS

## 2017-08-03 PROCEDURE — A9270 NON-COVERED ITEM OR SERVICE: HCPCS | Performed by: HOSPITALIST

## 2017-08-03 PROCEDURE — 82962 GLUCOSE BLOOD TEST: CPT

## 2017-08-03 PROCEDURE — 700102 HCHG RX REV CODE 250 W/ 637 OVERRIDE(OP): Performed by: HOSPITALIST

## 2017-08-03 PROCEDURE — 700102 HCHG RX REV CODE 250 W/ 637 OVERRIDE(OP): Performed by: INTERNAL MEDICINE

## 2017-08-03 PROCEDURE — 36415 COLL VENOUS BLD VENIPUNCTURE: CPT

## 2017-08-03 PROCEDURE — 99226 PR SUBSEQUENT OBSERVATION CARE,LEVEL III: CPT | Performed by: INTERNAL MEDICINE

## 2017-08-03 PROCEDURE — 97161 PT EVAL LOW COMPLEX 20 MIN: CPT

## 2017-08-03 PROCEDURE — G8979 MOBILITY GOAL STATUS: HCPCS | Mod: CI

## 2017-08-03 PROCEDURE — G0378 HOSPITAL OBSERVATION PER HR: HCPCS

## 2017-08-03 PROCEDURE — G8978 MOBILITY CURRENT STATUS: HCPCS | Mod: CK

## 2017-08-03 RX ORDER — WARFARIN SODIUM 2 MG/1
2 TABLET ORAL
Status: COMPLETED | OUTPATIENT
Start: 2017-08-03 | End: 2017-08-03

## 2017-08-03 RX ADMIN — BETAMETHASONE DIPROPIONATE: 0.5 CREAM TOPICAL at 09:00

## 2017-08-03 RX ADMIN — TAMSULOSIN HYDROCHLORIDE 0.4 MG: 0.4 CAPSULE ORAL at 09:33

## 2017-08-03 RX ADMIN — WARFARIN SODIUM 2 MG: 2 TABLET ORAL at 17:22

## 2017-08-03 RX ADMIN — SPIRONOLACTONE 12.5 MG: 25 TABLET, FILM COATED ORAL at 09:34

## 2017-08-03 RX ADMIN — FINASTERIDE 5 MG: 5 TABLET, FILM COATED ORAL at 09:32

## 2017-08-03 RX ADMIN — LEVOTHYROXINE SODIUM 100 MCG: 100 TABLET ORAL at 09:34

## 2017-08-03 ASSESSMENT — COGNITIVE AND FUNCTIONAL STATUS - GENERAL
CLIMB 3 TO 5 STEPS WITH RAILING: A LOT
SUGGESTED CMS G CODE MODIFIER MOBILITY: CK
STANDING UP FROM CHAIR USING ARMS: A LITTLE
MOBILITY SCORE: 17
MOVING TO AND FROM BED TO CHAIR: A LITTLE
MOVING FROM LYING ON BACK TO SITTING ON SIDE OF FLAT BED: A LITTLE
TURNING FROM BACK TO SIDE WHILE IN FLAT BAD: A LITTLE
WALKING IN HOSPITAL ROOM: A LITTLE

## 2017-08-03 ASSESSMENT — GAIT ASSESSMENTS
DISTANCE (FEET): 25
DEVIATION: DECREASED TOE OFF;DECREASED HEEL STRIKE;SHUFFLED GAIT;BRADYKINETIC
GAIT LEVEL OF ASSIST: CONTACT GUARD ASSIST
ASSISTIVE DEVICE: FRONT WHEEL WALKER

## 2017-08-03 ASSESSMENT — PAIN SCALES - GENERAL
PAINLEVEL_OUTOF10: 0

## 2017-08-03 ASSESSMENT — LIFESTYLE VARIABLES: DO YOU DRINK ALCOHOL: NO

## 2017-08-03 NOTE — PROGRESS NOTES
[facility] Progress Note    Date of Service: 8/3/2017    Chief Complaint  89 y.o. male with T2DM, h/o chronic systolic CHF, hypothyroidism, h/o CVA and chronic afib, status post BiV pacemaker on chronic Coumadin, bioprosthetic aortic valve replacement for aortic stenosis, previously admitted at [facility] two days ago when he had his pacemaker changed by EP. He was then discharged. His family reported that the patient has not been himself since. They saw him the night before where he was in bed. In the morning if found him on the floor confused. Admitted for syncope. Interrogation of pacemaker showed intermittent non-capture. EP saw pt, LV lead vector was changed and now has capture at 1.75 volts, RV lead has excellent capture down to 0.5V. Cards consulted.  Last echo showed EF 30%, Grade III-IV diastolic dysfunction (restrictive pattern), with known bioprosthetic aortic valve with increased gradient.    Interval Problem Update  8/3/2017 - no overnight events. Remains hemodynamically stable and afebrile. BP trend better. Stable on RA. INR 3.88. Cards discontinued metoprolol and lisinopril. PT/OT recommending SNF placement.    > Seen and examined. Comfortable. Per son, more clear melvi. No CP, SOB, N/V, abd pain. Discussed PT/OT recommendations of SNF placement, pt amenable to that.       Consultants/Specialty  Cardiology/EP    Disposition  Monitor on telemetry. SNF placement.       ROS     Pertinent positives/negatives as mentioned above.     A complete review of systems was done. All other systems were negative.      Physical Exam  Laboratory/Imaging   Hemodynamics  Temp (24hrs), Av.2 °C (97.1 °F), Min:36 °C (96.8 °F), Max:36.4 °C (97.5 °F)   Temperature: 36 °C (96.8 °F)  Pulse  Av.9  Min: 64  Max: 74    Blood Pressure : 127/72 mmHg      Respiratory      Respiration: 17, Pulse Oximetry: 97 %        RUL Breath Sounds: Clear, RML Breath Sounds: Diminished, RLL Breath Sounds: Diminished, WILY Breath Sounds:  Clear, LLL Breath Sounds: Diminished    Fluids    Intake/Output Summary (Last 24 hours) at 08/03/17 0725  Last data filed at 08/03/17 0000   Gross per 24 hour   Intake    300 ml   Output    500 ml   Net   -200 ml       Nutrition  Orders Placed This Encounter   Procedures   • DIET ORDER     Standing Status: Standing      Number of Occurrences: 1      Standing Expiration Date:      Order Specific Question:  Diet:     Answer:  Regular [1]     Order Specific Question:  Texture/Fiber modifications:     Answer:  Dysphagia 3(Mechanical Soft)specify fluid consistency(question 6) [3]     Physical Exam   Constitutional: He is oriented to person, place, and time. He appears well-developed. No distress.   HENT:   Head: Normocephalic and atraumatic.   Mouth/Throat: Oropharynx is clear and moist. No oropharyngeal exudate.   Eyes: EOM are normal. Pupils are equal, round, and reactive to light. Right eye exhibits no discharge. Left eye exhibits no discharge. No scleral icterus.   Neck: Normal range of motion. Neck supple. No thyromegaly present.   Cardiovascular: Normal rate.  Exam reveals no gallop and no friction rub.    No murmur heard.  Irregular rhythm   Pulmonary/Chest: Effort normal and breath sounds normal. He has no wheezes. He has no rales. He exhibits no tenderness.   (+) PPM on the right upper chest, with healing bruising.   Abdominal: Soft. Bowel sounds are normal. There is no tenderness. There is no rebound and no guarding.   Musculoskeletal: Normal range of motion. He exhibits no edema or tenderness.   Lymphadenopathy:     He has no cervical adenopathy.   Neurological: He is alert and oriented to person, place, and time.   Answers questions appropriately. Coherent.   Skin: Skin is warm and dry. No rash noted. He is not diaphoretic. No erythema.   Psychiatric: His behavior is normal. Judgment and thought content normal.   Flat affect.   Vitals reviewed.      Recent Labs      08/01/17   0351   WBC  8.6   RBC  3.78*    HEMOGLOBIN  12.7*   HEMATOCRIT  36.2*   MCV  95.8   MCH  33.6*   MCHC  35.1   RDW  44.3   PLATELETCT  161*   MPV  10.1     Recent Labs      08/01/17   0351   SODIUM  134*   POTASSIUM  3.8   CHLORIDE  102   CO2  24   GLUCOSE  128*   BUN  46*   CREATININE  1.20   CALCIUM  8.6     Recent Labs      07/31/17   1810  08/01/17   0351  08/02/17   0140  08/03/17   0148   APTT  46.2*   --    --    --    INR  3.11*  3.76*  4.92*  3.88*                  Assessment/Plan     Syncope (present on admission)  Assessment & Plan  - likely more related to low BP. Unclear if intermittent capture of PPM contributed. S/p readjustment of PPM.   - Cards following. PPM readjusted.   - holding lisinopril and metoprolol. Continue lower dose of aldactone and lasix. Continue close BP monitoring.   - continue to monitor on telemetry.  - PT/OT recommending SNF placement, pt amenable to that. Will get CM/SW to start referrals.     Cardiac pacemaker in situ (present on admission)  Assessment & Plan  - With intermittent non-capture. S/p re-adjustment of leads.  - continue to monitor on telemetry.     Elevated troponin (present on admission)  Assessment & Plan  - likely demand ischemia. Peaked.   - Continue to monitor on telemetry.     Constipation (present on admission)  Assessment & Plan  - continue scheduled miralax, and PRN bowel protocol.     Chronic congestive heart failure (CMS-HCC) (present on admission)  Assessment & Plan  - Not in acute failure.  - Continue lower dose of furosemide and lower dose of aldactone. Holding lisinopril, and metoprolol due to low BP, which likely caused his syncopal episodes.     Chronic A-fib (CMS-HCC) (present on admission)  Assessment & Plan  - rate controlled. Continue metoprolol. Continue Coumadin per pharmacy dosing.   - continue to monitor on telemetry.     S/P AVR (aortic valve replacement) (present on admission)  Assessment & Plan  - Continue Coumadin.    Acquired circulating anticoagulants (CMS-HCC)  (present on admission)  Assessment & Plan  - Continue Coumadin for Afib.     Hypothyroidism (present on admission)  Assessment & Plan  - TSH WNL.  - Continue synthroid.     CKD (chronic kidney disease), stage III (present on admission)  Assessment & Plan  - stable at baseline.       Medications reviewed, Radiology images reviewed and Labs reviewed  Wasserman catheter: No Wasserman      DVT Prophylaxis: Warfarin (Coumadin)        Assessed for rehab: Patient was assess for and/or received rehabilitation services during this hospitalization

## 2017-08-03 NOTE — PROGRESS NOTES
Handoff report received. Assume patient care. Patient sitting up in chair with son at bedside. 2 RNs transported pt back to bed. Patient not in pain. Patient not in distress, AAOX 2. Pt disoriented to time and event. Bed alarm is on. Safety precautions in place. Call light and personal belongings within reach. Educated to call for assistance if needed.

## 2017-08-03 NOTE — PROGRESS NOTES
Cardiology Progress Note               Author: Viktor Collado Date & Time created: 8/3/2017  4:49 PM     ID: 89-year-old man with a history of systolic congestive heart failure, sick sinus syndrome with a biventricular defibrillator in place, status post bioprosthetic aortic valve that is now moderately stenosed who is admitted with syncope that after workup is related to aggressive antihypertensives in the setting of progression to moderate periprosthetic aortic valvular stenosis    Interval History:  No acute events    Review of Systems   Unable to perform ROS: other       Physical Exam   Constitutional: He is oriented to person, place, and time. No distress.   Pleasant, elderly man, hard of hearing and accompanied by his son today and no distress when interviewed   HENT:   Head: Normocephalic and atraumatic.   Eyes: EOM are normal. Pupils are equal, round, and reactive to light.   Neck: No JVD present.   Cardiovascular: Normal rate, regular rhythm and intact distal pulses.  Exam reveals no gallop and no friction rub.    Murmur (grade 2 blowing systolic murmur at left lower sternal border) heard.  Pacemaker generator noted in right chest   Pulmonary/Chest: Effort normal and breath sounds normal. No respiratory distress. He has no wheezes. He has no rales. He exhibits no tenderness.   Abdominal: Soft. Bowel sounds are normal. He exhibits no distension.   Musculoskeletal: He exhibits no edema.   Neurological: He is alert and oriented to person, place, and time.   Skin: Skin is warm and dry. No rash noted. He is not diaphoretic. No erythema. No pallor.   Psychiatric: He has a normal mood and affect. Judgment and thought content normal.   Nursing note and vitals reviewed.      Hemodynamics:  Temp (24hrs), Av.2 °C (97.2 °F), Min:36 °C (96.8 °F), Max:36.4 °C (97.5 °F)  Temperature: 36.2 °C (97.1 °F)  Pulse  Av.9  Min: 64  Max: 114   Blood Pressure : 136/88 mmHg     Respiratory:    Respiration: 16, Pulse  Oximetry: 90 %        RUL Breath Sounds: Clear, RML Breath Sounds: Diminished, RLL Breath Sounds: Diminished, WILY Breath Sounds: Clear, LLL Breath Sounds: Diminished  Fluids:  Date 08/03/17 0700 - 08/04/17 0659   Shift 2924-7390 1165-4172 7936-3534 24 Hour Total   I  N  T  A  K  E   P.O. 240   240    Shift Total 240   240   O  U  T  P  U  T   Shift Total       Weight (kg) 75.8 75.8 75.8 75.8       Weight: 75.8 kg (167 lb 1.7 oz)  GI/Nutrition:  Orders Placed This Encounter   Procedures   • DIET ORDER     Standing Status: Standing      Number of Occurrences: 1      Standing Expiration Date:      Order Specific Question:  Diet:     Answer:  Regular [1]     Order Specific Question:  Texture/Fiber modifications:     Answer:  Dysphagia 3(Mechanical Soft)specify fluid consistency(question 6) [3]     Lab Results:  Recent Labs      08/01/17   0351   WBC  8.6   RBC  3.78*   HEMOGLOBIN  12.7*   HEMATOCRIT  36.2*   MCV  95.8   MCH  33.6*   MCHC  35.1   RDW  44.3   PLATELETCT  161*   MPV  10.1     Recent Labs      08/01/17   0351   SODIUM  134*   POTASSIUM  3.8   CHLORIDE  102   CO2  24   GLUCOSE  128*   BUN  46*   CREATININE  1.20   CALCIUM  8.6     Recent Labs      07/31/17   1810  08/01/17   0351  08/02/17   0140  08/03/17   0148   APTT  46.2*   --    --    --    INR  3.11*  3.76*  4.92*  3.88*         Recent Labs      07/31/17   1810  07/31/17   2243  08/01/17   0351   TROPONINI  0.23*  0.21*  0.18*             Medical Decision Making, by Problem:  Active Hospital Problems    Diagnosis   • Syncope [R55]   • Constipation [K59.00]   • Elevated troponin [R74.8]   • Cardiac pacemaker in situ [Z95.0]   • CKD (chronic kidney disease), stage III [N18.3]   • Hypothyroidism [E03.9]   • Acquired circulating anticoagulants (CMS-HCC) [D68.318]   • S/P AVR (aortic valve replacement) [Z95.2]   • Chronic A-fib (CMS-HCC) [I48.91]   • Chronic congestive heart failure (CMS-HCC) [I50.9]       Plan:    Syncope: Related to again aggressive  antihypertensive use in the setting of progression to moderate stenosis of his periprosthetic aortic valve. I would certainly recommend cautious use of antihypertensives of heart failure therapy though I do agree with the addition of 12.5 mg by mouth daily and spironolactone as has been done.    Systolic congestive heart failure: Apart from spironolactone as above ACE inhibitor and beta blocker relatively contraindicated in conjunction with his syncope, and hypotension    Moderate periprosthetic aortic valve stenosis: Expectant management, nonsurgical candidate    Sick sinus syndrome: Continue warfarin    I have no additional recommendations, and our service will sign off at this time    EKG reviewed, Medications reviewed, Labs reviewed and Radiology images reviewed                  Thank you for allowing me to participate in the care of this patient. Please call if I any clarification would be useful.    Viktor Collado MD  Cardiologist, [facility] [place]

## 2017-08-03 NOTE — CARE PLAN
Problem: Communication  Goal: The ability to communicate needs accurately and effectively will improve  Outcome: PROGRESSING AS EXPECTED  Pt is able to verbalize questions without difficulty.  Intervention: Delta patient and significant other/support system to call light to alert staff of needs  Pt has been reoriented on place and time throughout the day.  Family at bedside 2x today

## 2017-08-03 NOTE — THERAPY
"Physical Therapy Evaluation completed.   Bed Mobility:  Supine to Sit: Contact Guard Assist (with rail and HOB elevated. )  Transfers: Sit to Stand: Minimal Assist  Gait: Level Of Assist: Contact Guard Assist with Front-Wheel Walker       Plan of Care: Will benefit from Physical Therapy 3 times per week  Discharge Recommendations: Equipment: Will Continue to Assess for Equipment Needs. Post-acute therapy Discharge to a transitional care facility for continued skilled therapy services.    See \"Rehab Therapy-Acute\" Patient Summary Report for complete documentation.     "

## 2017-08-03 NOTE — PROGRESS NOTES
Skin check: Pt has two skin tears on left knee.  Coccyx is red and blanchable.  Some areas that look like healed soars on coccyx.  Pt has small abrasions on legs and contusions over arms.  Pt has small surface wound on right cheek.

## 2017-08-03 NOTE — PROGRESS NOTES
Report received, assumed care, assessment done. Pt aware of plan of care. Will monitor. Denies pain. Pt very [place]. A and O x2. Impulsive.

## 2017-08-03 NOTE — DISCHARGE PLANNING
Medical Social Work    SW met with pt's son at bedside. Son is agreeable to local SNF's. SW provided medicare ratings list. Son will review and discuss with family. SW to f/u tomorrow.

## 2017-08-03 NOTE — PROGRESS NOTES
Inpatient Anticoagulation Service Note    Date: 8/3/2017  Reason for Anticoagulation: Atrial Fibrillation  Hemoglobin Value: 12.7  Hematocrit Value: 36.2  Lab Platelet Value: 161  Target INR: 2.0 to 3.0  INR from last 7 days     Date/Time INR Value    08/03/17 0148 (!)3.88    08/02/17 0140 (!)4.92    08/01/17 0351 (!)3.76    07/31/17 1810 (!)3.11        Dose from last 7 days     Date/Time Dose (mg)    08/03/17 0900 2    08/02/17 1100 0    08/01/17 1100 0    07/31/17 2135 5        Significant Interactions: Thyroid Medications, spironolactone    Comments: INR fell 1 point last night. I am afraid if I continue to hold again, the dose will fall below goal. Patient has a very high CHADsVasc score so I will give a small dose today. No s/s of bleeding. No new interactions.    Plan: 2 mg tonight, INR for AM  Education Material Provided?: No, Chronic warfarin patient  Pharmacist suggested discharge dosing: TBD, INR supratherapeutic, possibly home dosing of warfarin 7.5 mg on Mon, Wed, Fri and 5 mg all other days. Recommend patient have INR checked 2-3 days after discharge.     Nieves Miranda, BurkeD

## 2017-08-04 LAB
ALBUMIN SERPL BCP-MCNC: 2.8 G/DL (ref 3.2–4.9)
ALBUMIN/GLOB SERPL: 1.1 G/DL
ALP SERPL-CCNC: 89 U/L (ref 30–99)
ALT SERPL-CCNC: 35 U/L (ref 2–50)
ANION GAP SERPL CALC-SCNC: 6 MMOL/L (ref 0–11.9)
AST SERPL-CCNC: 45 U/L (ref 12–45)
BILIRUB SERPL-MCNC: 1 MG/DL (ref 0.1–1.5)
BUN SERPL-MCNC: 19 MG/DL (ref 8–22)
CALCIUM SERPL-MCNC: 8.5 MG/DL (ref 8.5–10.5)
CHLORIDE SERPL-SCNC: 99 MMOL/L (ref 96–112)
CO2 SERPL-SCNC: 27 MMOL/L (ref 20–33)
CREAT SERPL-MCNC: 0.82 MG/DL (ref 0.5–1.4)
ERYTHROCYTE [DISTWIDTH] IN BLOOD BY AUTOMATED COUNT: 44.7 FL (ref 35.9–50)
GFR SERPL CREATININE-BSD FRML MDRD: >60 ML/MIN/1.73 M 2
GLOBULIN SER CALC-MCNC: 2.6 G/DL (ref 1.9–3.5)
GLUCOSE BLD-MCNC: 139 MG/DL (ref 65–99)
GLUCOSE BLD-MCNC: 146 MG/DL (ref 65–99)
GLUCOSE BLD-MCNC: 159 MG/DL (ref 65–99)
GLUCOSE BLD-MCNC: 162 MG/DL (ref 65–99)
GLUCOSE BLD-MCNC: 167 MG/DL (ref 65–99)
GLUCOSE SERPL-MCNC: 173 MG/DL (ref 65–99)
HCT VFR BLD AUTO: 36.2 % (ref 42–52)
HGB BLD-MCNC: 12.5 G/DL (ref 14–18)
INR PPP: 3.06 (ref 0.87–1.13)
MCH RBC QN AUTO: 33.2 PG (ref 27–33)
MCHC RBC AUTO-ENTMCNC: 34.5 G/DL (ref 33.7–35.3)
MCV RBC AUTO: 96 FL (ref 81.4–97.8)
PLATELET # BLD AUTO: 255 K/UL (ref 164–446)
PMV BLD AUTO: 9.4 FL (ref 9–12.9)
POTASSIUM SERPL-SCNC: 4.2 MMOL/L (ref 3.6–5.5)
PROT SERPL-MCNC: 5.4 G/DL (ref 6–8.2)
PROTHROMBIN TIME: 32.6 SEC (ref 12–14.6)
RBC # BLD AUTO: 3.77 M/UL (ref 4.7–6.1)
SODIUM SERPL-SCNC: 132 MMOL/L (ref 135–145)
WBC # BLD AUTO: 7.3 K/UL (ref 4.8–10.8)

## 2017-08-04 PROCEDURE — A9270 NON-COVERED ITEM OR SERVICE: HCPCS | Performed by: HOSPITALIST

## 2017-08-04 PROCEDURE — G0378 HOSPITAL OBSERVATION PER HR: HCPCS

## 2017-08-04 PROCEDURE — 99225 PR SUBSEQUENT OBSERVATION CARE,LEVEL II: CPT | Performed by: INTERNAL MEDICINE

## 2017-08-04 PROCEDURE — 80053 COMPREHEN METABOLIC PANEL: CPT

## 2017-08-04 PROCEDURE — 700102 HCHG RX REV CODE 250 W/ 637 OVERRIDE(OP): Performed by: INTERNAL MEDICINE

## 2017-08-04 PROCEDURE — A9270 NON-COVERED ITEM OR SERVICE: HCPCS | Performed by: INTERNAL MEDICINE

## 2017-08-04 PROCEDURE — 36415 COLL VENOUS BLD VENIPUNCTURE: CPT

## 2017-08-04 PROCEDURE — 700102 HCHG RX REV CODE 250 W/ 637 OVERRIDE(OP)

## 2017-08-04 PROCEDURE — A6206 CONTACT LAYER <= 16 SQ IN: HCPCS | Performed by: INTERNAL MEDICINE

## 2017-08-04 PROCEDURE — 82962 GLUCOSE BLOOD TEST: CPT | Mod: 91

## 2017-08-04 PROCEDURE — 85027 COMPLETE CBC AUTOMATED: CPT

## 2017-08-04 PROCEDURE — 85610 PROTHROMBIN TIME: CPT

## 2017-08-04 PROCEDURE — 700102 HCHG RX REV CODE 250 W/ 637 OVERRIDE(OP): Performed by: HOSPITALIST

## 2017-08-04 PROCEDURE — A9270 NON-COVERED ITEM OR SERVICE: HCPCS

## 2017-08-04 RX ORDER — WARFARIN SODIUM 5 MG/1
5 TABLET ORAL
Status: DISCONTINUED | OUTPATIENT
Start: 2017-08-04 | End: 2017-08-07 | Stop reason: HOSPADM

## 2017-08-04 RX ADMIN — WARFARIN SODIUM 5 MG: 5 TABLET ORAL at 17:35

## 2017-08-04 RX ADMIN — FINASTERIDE 5 MG: 5 TABLET, FILM COATED ORAL at 09:05

## 2017-08-04 RX ADMIN — INSULIN LISPRO 2 UNITS: 100 INJECTION, SOLUTION INTRAVENOUS; SUBCUTANEOUS at 11:50

## 2017-08-04 RX ADMIN — LEVOTHYROXINE SODIUM 100 MCG: 100 TABLET ORAL at 09:05

## 2017-08-04 RX ADMIN — FUROSEMIDE 40 MG: 40 TABLET ORAL at 09:05

## 2017-08-04 RX ADMIN — TAMSULOSIN HYDROCHLORIDE 0.4 MG: 0.4 CAPSULE ORAL at 09:05

## 2017-08-04 RX ADMIN — SPIRONOLACTONE 12.5 MG: 25 TABLET, FILM COATED ORAL at 09:00

## 2017-08-04 RX ADMIN — INSULIN LISPRO 2 UNITS: 100 INJECTION, SOLUTION INTRAVENOUS; SUBCUTANEOUS at 17:36

## 2017-08-04 ASSESSMENT — COGNITIVE AND FUNCTIONAL STATUS - GENERAL
CLIMB 3 TO 5 STEPS WITH RAILING: A LOT
SUGGESTED CMS G CODE MODIFIER MOBILITY: CK
DAILY ACTIVITIY SCORE: 15
SUGGESTED CMS G CODE MODIFIER DAILY ACTIVITY: CK
WALKING IN HOSPITAL ROOM: A LITTLE
MOBILITY SCORE: 17
MOVING TO AND FROM BED TO CHAIR: A LITTLE
MOVING FROM LYING ON BACK TO SITTING ON SIDE OF FLAT BED: A LITTLE
PERSONAL GROOMING: A LITTLE
STANDING UP FROM CHAIR USING ARMS: A LITTLE
EATING MEALS: A LITTLE
DRESSING REGULAR LOWER BODY CLOTHING: A LOT
HELP NEEDED FOR BATHING: A LOT
TOILETING: A LOT
TURNING FROM BACK TO SIDE WHILE IN FLAT BAD: A LITTLE
DRESSING REGULAR UPPER BODY CLOTHING: A LITTLE

## 2017-08-04 ASSESSMENT — PAIN SCALES - GENERAL: PAINLEVEL_OUTOF10: 0

## 2017-08-04 ASSESSMENT — LIFESTYLE VARIABLES: DO YOU DRINK ALCOHOL: NO

## 2017-08-04 NOTE — DISCHARGE PLANNING
TCN met with patient son to discuss SNF choice. Per medicare list provided by NIURKA patient son selected 1. [facility], [facility], [facility], [facility]. Choice signed and faxed to [facility]. TCN to follow as needed. Anticipate discharge once accepting facility established.

## 2017-08-04 NOTE — DISCHARGE PLANNING
Received choice form from [facility] at [phone].  Referral faxed to [facility] at [phone] on 08-04-17.

## 2017-08-04 NOTE — PROGRESS NOTES
Handoff report received. Assume patient care. Patient resting in bed. Patient not in pain. Patient not in distress, AAOX 1. Patient oriented to person. Bed alarm is on. Safety precautions in place. Call light and personal belongings within reach. Educated to call for assistance if needed.

## 2017-08-04 NOTE — PROGRESS NOTES
Report received, pt care assumed, tele box on. VSS, pt assessment complete. Pt aaox 2, only to place and self, no signs of distress noted at this time. POC discussed with pt and verbalizes no questions. Pt c/o of no pain, PRN pain med not administered. Pt denies any additional needs at this time. Bed in lowest position, bed alarm on, pt educated on fall risk and verbalized understanding, call light within reach, will continue to monitor.

## 2017-08-04 NOTE — PROGRESS NOTES
Inpatient Anticoagulation Service Note    Date: 8/4/2017  Reason for Anticoagulation: Atrial Fibrillation  Hemoglobin Value: 12.5  Hematocrit Value: 36.2  Lab Platelet Value: 255  Target INR: 2.0 to 3.0  INR from last 7 days     Date/Time INR Value    08/04/17 0336 (!)3.06    08/03/17 0148 (!)3.88    08/02/17 0140 (!)4.92    08/01/17 0351 (!)3.76    07/31/17 1810 (!)3.11        Dose from last 7 days     Date/Time Dose (mg)    08/04/17 1400 5    08/03/17 0900 2    08/02/17 1100 0    08/01/17 1100 0    07/31/17 2135 5        Home dose = 7.5 mg on MWF and 5 mg all other days  Significant Interactions: Thyroid Medications, Spironolactone    Comments: INR falling nicely. Didn't fall as much with small dose yesterday. I will give 5 mg daily starting today and trend INRs. Consider 7.5 mg boost if INR falls too much. Home dosing as above. Goal is to prevent subtherapeutic INRs. No s/s of bleeding. No new interactions.     Plan: 5 mg tonight, INR for AM  Education Material Provided?: No, chronic warfarin patient  Pharmacist suggested discharge dosing: possibly warfarin 5 mg daily. Home dosing = 7.5 mg Mon, Wed, Fri and 5 mg all other days. Recommend patient have INR checked 2-3 days after discharge.     Nieves Miranda, PharmD

## 2017-08-04 NOTE — CARE PLAN
Problem: Safety  Goal: Will remain free from falls  Intervention: Implement fall precautions  Patient is wearing skid free socks, bed alams is on, call light is within reach, and safety precautions in place.        Problem: Infection  Goal: Will remain free from infection  Intervention: Implement standard precautions and perform hand washing before and after patient contact  RN will follow protocols and necessary steps to minimize the spread of infection. RN educated pt and and any visitors on proper hand hygiene.

## 2017-08-04 NOTE — PROGRESS NOTES
[facility] Hospitalist Progress Note    Date of Service: 2017    Chief Complaint  89 y.o. male with T2DM, h/o chronic systolic CHF, hypothyroidism, h/o CVA and chronic afib, status post BiV pacemaker on chronic Coumadin, bioprosthetic aortic valve replacement for aortic stenosis, previously admitted at [facility] two days ago when he had his pacemaker changed by EP. He was then discharged. His family reported that the patient has not been himself since. They saw him the night before where he was in bed. In the morning if found him on the floor confused. Admitted for syncope. Interrogation of pacemaker showed intermittent non-capture. EP saw pt, LV lead vector was changed and now has capture at 1.75 volts, RV lead has excellent capture down to 0.5V. Cards consulted.  Last echo showed EF 30%, Grade III-IV diastolic dysfunction (restrictive pattern), with known bioprosthetic aortic valve with increased gradient. Cards discontinued metoprolol and lisinopril as relatively contraindicated in light of hypotension and syncope. Aldactone decreased to 12.5mg daily, with decreased lasix. PT/OT recommending SNF placement, patient amenable to that.      Interval Problem Update  2017 - uneventful night. VSS. Afebrile. Saturating well on RA.  SNF placement underway.     > Seen and examined. No complaints. Comfortable. No CP, SOB, N/V, abd pain.        Consultants/Specialty  Cardiology/EP    Disposition  Monitor on telemetry. Needs SNF placement.       ROS     Pertinent positives/negatives as mentioned above.     A complete review of systems was done. All other systems were negative.      Physical Exam  Laboratory/Imaging   Hemodynamics  Temp (24hrs), Av.3 °C (97.3 °F), Min:36.1 °C (97 °F), Max:36.5 °C (97.7 °F)   Temperature: 36.5 °C (97.7 °F)  Pulse  Av.3  Min: 64  Max: 114    Blood Pressure : 120/68 mmHg      Respiratory      Respiration: 18, Pulse Oximetry: 98 %        RUL Breath Sounds: Clear, RML Breath Sounds:  Diminished, RLL Breath Sounds: Diminished, WILY Breath Sounds: Clear, LLL Breath Sounds: Diminished    Fluids    Intake/Output Summary (Last 24 hours) at 08/04/17 0658  Last data filed at 08/03/17 0800   Gross per 24 hour   Intake    240 ml   Output      0 ml   Net    240 ml       Nutrition  Orders Placed This Encounter   Procedures   • DIET ORDER     Standing Status: Standing      Number of Occurrences: 1      Standing Expiration Date:      Order Specific Question:  Diet:     Answer:  Regular [1]     Order Specific Question:  Texture/Fiber modifications:     Answer:  Dysphagia 3(Mechanical Soft)specify fluid consistency(question 6) [3]     Physical Exam   Constitutional: He is oriented to person, place, and time. He appears well-developed. No distress.   HENT:   Head: Normocephalic and atraumatic.   Mouth/Throat: Oropharynx is clear and moist. No oropharyngeal exudate.   Eyes: EOM are normal. Pupils are equal, round, and reactive to light. Right eye exhibits no discharge. Left eye exhibits no discharge. No scleral icterus.   Neck: Normal range of motion. Neck supple. No thyromegaly present.   Cardiovascular: Normal rate.  Exam reveals no gallop and no friction rub.    No murmur heard.  Irregular rhythm   Pulmonary/Chest: Effort normal and breath sounds normal. He has no wheezes. He has no rales. He exhibits no tenderness.   (+) PPM on the right upper chest, with healing bruising.   Abdominal: Soft. Bowel sounds are normal. There is no tenderness. There is no rebound and no guarding.   Musculoskeletal: Normal range of motion. He exhibits no edema or tenderness.   Lymphadenopathy:     He has no cervical adenopathy.   Neurological: He is alert and oriented to person, place, and time.   Answers questions appropriately. Coherent, and conversant.   Skin: Skin is warm and dry. He is not diaphoretic. No erythema.   (+) bruise on the anterior chest wall   Psychiatric: His behavior is normal. Judgment and thought content  normal.   Flat affect.   Vitals reviewed.              Recent Labs      08/02/17   0140  08/03/17   0148  08/04/17   0336   INR  4.92*  3.88*  3.06*                  Assessment/Plan     Syncope (present on admission)  Assessment & Plan  - likely more related to low BP. Unclear if intermittent capture of PPM contributed. S/p readjustment of PPM.   - lisinopril and metoprolol discontinued. Continue lower dose of aldactone and lasix. Continue close BP monitoring.   - continue to monitor on telemetry.  - will need SNF placement. Continue PT/OT while in-house.     Cardiac pacemaker in situ (present on admission)  Assessment & Plan  - With intermittent non-capture. S/p re-adjustment of leads, improved capture.   - continue to monitor on telemetry.     Elevated troponin (present on admission)  Assessment & Plan  - likely demand ischemia. Peaked.   - Continue to monitor on telemetry.     Constipation (present on admission)  Assessment & Plan  - continue scheduled miralax, and PRN bowel protocol.     Chronic congestive heart failure (CMS-HCC) (present on admission)  Assessment & Plan  - Not in acute failure.  - Continue lower dose of furosemide and lower dose of aldactone.   - Unable to restart lisinopril, and metoprolol due to low BP causing syncopal episodes.     Chronic A-fib (CMS-HCC) (present on admission)  Assessment & Plan  - rate controlled. Continue Coumadin per pharmacy dosing. Metoprolol discontinued for low BP.   - continue to monitor on telemetry.     S/P AVR (aortic valve replacement) (present on admission)  Assessment & Plan  - Continue Coumadin.    Acquired circulating anticoagulants (CMS-HCC) (present on admission)  Assessment & Plan  - Continue Coumadin for Afib.     Hypothyroidism (present on admission)  Assessment & Plan  - TSH WNL.  - Continue synthroid.     CKD (chronic kidney disease), stage III (present on admission)  Assessment & Plan  - stable at baseline.       Medications reviewed, Radiology  images reviewed and Labs reviewed  Wasserman catheter: No Wasserman      DVT Prophylaxis: Warfarin (Coumadin)        Assessed for rehab: Patient was assess for and/or received rehabilitation services during this hospitalization

## 2017-08-04 NOTE — CARE PLAN
Problem: Safety  Goal: Will remain free from injury  Outcome: PROGRESSING AS EXPECTED  Patient rounded on frequently, bed alarm in place on bed and in chair, switched as needed, call light within reach, no needs at this time    Problem: Knowledge Deficit  Goal: Knowledge of disease process/condition, treatment plan, diagnostic tests, and medications will improve  Outcome: PROGRESSING SLOWER THAN EXPECTED  Patient educated on medications and plan of care, patient verbalizes understanding but due to memory needs frequent reinforcement

## 2017-08-05 LAB
GLUCOSE BLD-MCNC: 142 MG/DL (ref 65–99)
GLUCOSE BLD-MCNC: 143 MG/DL (ref 65–99)
GLUCOSE BLD-MCNC: 170 MG/DL (ref 65–99)
GLUCOSE BLD-MCNC: 194 MG/DL (ref 65–99)
INR PPP: 2.92 (ref 0.87–1.13)
PROTHROMBIN TIME: 31.4 SEC (ref 12–14.6)

## 2017-08-05 PROCEDURE — 82962 GLUCOSE BLOOD TEST: CPT | Mod: 91

## 2017-08-05 PROCEDURE — 700102 HCHG RX REV CODE 250 W/ 637 OVERRIDE(OP): Performed by: HOSPITALIST

## 2017-08-05 PROCEDURE — G0378 HOSPITAL OBSERVATION PER HR: HCPCS

## 2017-08-05 PROCEDURE — A9270 NON-COVERED ITEM OR SERVICE: HCPCS | Performed by: HOSPITALIST

## 2017-08-05 PROCEDURE — 85610 PROTHROMBIN TIME: CPT

## 2017-08-05 PROCEDURE — A9270 NON-COVERED ITEM OR SERVICE: HCPCS

## 2017-08-05 PROCEDURE — A9270 NON-COVERED ITEM OR SERVICE: HCPCS | Performed by: INTERNAL MEDICINE

## 2017-08-05 PROCEDURE — 36415 COLL VENOUS BLD VENIPUNCTURE: CPT

## 2017-08-05 PROCEDURE — 700102 HCHG RX REV CODE 250 W/ 637 OVERRIDE(OP): Performed by: INTERNAL MEDICINE

## 2017-08-05 PROCEDURE — 700102 HCHG RX REV CODE 250 W/ 637 OVERRIDE(OP)

## 2017-08-05 PROCEDURE — 99225 PR SUBSEQUENT OBSERVATION CARE,LEVEL II: CPT | Performed by: INTERNAL MEDICINE

## 2017-08-05 RX ADMIN — TAMSULOSIN HYDROCHLORIDE 0.4 MG: 0.4 CAPSULE ORAL at 08:45

## 2017-08-05 RX ADMIN — INSULIN LISPRO 2 UNITS: 100 INJECTION, SOLUTION INTRAVENOUS; SUBCUTANEOUS at 11:51

## 2017-08-05 RX ADMIN — STANDARDIZED SENNA CONCENTRATE AND DOCUSATE SODIUM 2 TABLET: 8.6; 5 TABLET, FILM COATED ORAL at 08:45

## 2017-08-05 RX ADMIN — INSULIN LISPRO 2 UNITS: 100 INJECTION, SOLUTION INTRAVENOUS; SUBCUTANEOUS at 22:45

## 2017-08-05 RX ADMIN — WARFARIN SODIUM 5 MG: 5 TABLET ORAL at 17:59

## 2017-08-05 RX ADMIN — FUROSEMIDE 40 MG: 40 TABLET ORAL at 08:45

## 2017-08-05 RX ADMIN — LEVOTHYROXINE SODIUM 100 MCG: 100 TABLET ORAL at 08:44

## 2017-08-05 RX ADMIN — SPIRONOLACTONE 12.5 MG: 25 TABLET, FILM COATED ORAL at 08:45

## 2017-08-05 RX ADMIN — FINASTERIDE 5 MG: 5 TABLET, FILM COATED ORAL at 08:45

## 2017-08-05 ASSESSMENT — PAIN SCALES - GENERAL: PAINLEVEL_OUTOF10: 0

## 2017-08-05 NOTE — PROGRESS NOTES
Inpatient Anticoagulation Service Note    Date: 8/5/2017  Reason for Anticoagulation: Atrial Fibrillation  Hemoglobin Value: 12.5  Hematocrit Value: 36.2  Lab Platelet Value: 255  Target INR: 2.0 to 3.0  INR from last 7 days     Date/Time INR Value    08/05/17 0148 (!)2.92    08/04/17 0336 (!)3.06    08/03/17 0148 (!)3.88    08/02/17 0140 (!)4.92    08/01/17 0351 (!)3.76    07/31/17 1810 (!)3.11        Dose from last 7 days     Date/Time Dose (mg)    08/05/17 1300 5    08/04/17 1400 5    08/03/17 0900 2    08/02/17 1100 0    08/01/17 1100 0    07/31/17 2135 5        Home dose = 7.5 mg on MWF and 5 mg all other days  Significant Interactions: Thyroid Medications, spironolactone    Comments: INR fell into goal nicely. I will continue with 5 mg daily and trend INRs. No s/s of bleeding. No new interactions. [facility]    Plan: 5 mg, INR for AM  Education Material Provided?: No, chronic warfarin patient  Pharmacist suggested discharge dosing: Possibly 5 mg daily (home dosing was 7.5 mg Mon, Wed, Fri and 5 mg all other days but INR was elevated. Recommend patient have INR checked 2-3 days after discharge.     Nieves Miranda, PharmD

## 2017-08-05 NOTE — PROGRESS NOTES
Received report from Lisa and assumed care of pt. Pt is A&Ox4. No signs or symptoms of pain or distress. Assessment completed and plan of care discussed. Pt verbalized understanding of call light and communication board. Pt questions answered at this time. Fall precautions in place. Pt needs met at this time. Hourly rounding in place.

## 2017-08-05 NOTE — PROGRESS NOTES
[facility] Hospitalist Progress Note    Date of Service: 2017    Chief Complaint  89 y.o. male with T2DM, h/o chronic systolic CHF, hypothyroidism, h/o CVA and chronic afib, status post BiV pacemaker on chronic Coumadin, bioprosthetic aortic valve replacement for aortic stenosis, previously admitted at [facility] two days ago when he had his pacemaker changed by EP. He was then discharged. His family reported that the patient has not been himself since. They saw him the night before where he was in bed. In the morning if found him on the floor confused. Admitted for syncope. Interrogation of pacemaker showed intermittent non-capture. EP saw pt, LV lead vector was changed and now has capture at 1.75 volts, RV lead has excellent capture down to 0.5V. Cards consulted.  Last echo showed EF 30%, Grade III-IV diastolic dysfunction (restrictive pattern), with known bioprosthetic aortic valve with increased gradient. Cards discontinued metoprolol and lisinopril as relatively contraindicated in light of hypotension and syncope. Aldactone decreased to 12.5mg daily, with decreased lasix. PT/OT recommending SNF placement, patient amenable to that. SNF placement underway.       Interval Problem Update  2017 - no overnight events. Remains hemodynamically stable and afebrile. Stable on RA. INR thearapeutic. SNF referrals have been sent.     > Seen and examined. No complaints. Eating breakfast without issues. No CP, SOB, nausea, vomiting, abd pain.      Consultants/Specialty  Cardiology/EP    Disposition  Monitor on telemetry. Await SNF placement.       ROS     Pertinent positives/negatives as mentioned above.     A complete review of systems was done. All other systems were negative.      Physical Exam  Laboratory/Imaging   Hemodynamics  Temp (24hrs), Av.7 °C (98.1 °F), Min:36.2 °C (97.2 °F), Max:37.1 °C (98.8 °F)   Temperature: 36.3 °C (97.4 °F)  Pulse  Av  Min: 64  Max: 114    Blood Pressure : 118/79 mmHg       Respiratory      Respiration: 18, Pulse Oximetry: 96 %        RUL Breath Sounds: Clear, RML Breath Sounds: Diminished, RLL Breath Sounds: Diminished, WILY Breath Sounds: Clear, LLL Breath Sounds: Diminished    Fluids    Intake/Output Summary (Last 24 hours) at 08/05/17 0701  Last data filed at 08/04/17 1800   Gross per 24 hour   Intake      0 ml   Output    880 ml   Net   -880 ml       Nutrition  Orders Placed This Encounter   Procedures   • DIET ORDER     Standing Status: Standing      Number of Occurrences: 1      Standing Expiration Date:      Order Specific Question:  Diet:     Answer:  Regular [1]     Order Specific Question:  Texture/Fiber modifications:     Answer:  Dysphagia 3(Mechanical Soft)specify fluid consistency(question 6) [3]     Physical Exam   Constitutional: He is oriented to person, place, and time. He appears well-developed. No distress.   HENT:   Head: Normocephalic and atraumatic.   Mouth/Throat: Oropharynx is clear and moist. No oropharyngeal exudate.   Eyes: EOM are normal. Pupils are equal, round, and reactive to light. Right eye exhibits no discharge. Left eye exhibits no discharge. No scleral icterus.   Neck: Normal range of motion. Neck supple. No thyromegaly present.   Cardiovascular: Normal rate.  Exam reveals no gallop and no friction rub.    No murmur heard.  Irregular rhythm   Pulmonary/Chest: Effort normal and breath sounds normal. He has no wheezes. He has no rales. He exhibits no tenderness.   (+) PPM on the right upper chest, with healing bruising.   Abdominal: Soft. Bowel sounds are normal. There is no tenderness. There is no rebound and no guarding.   Musculoskeletal: Normal range of motion. He exhibits no edema or tenderness.   Lymphadenopathy:     He has no cervical adenopathy.   Neurological: He is alert and oriented to person, place, and time.   Answers questions appropriately. Coherent, and conversant.   Skin: Skin is warm and dry. He is not diaphoretic. No erythema.    (+) bruise on the anterior chest wall   Psychiatric: His behavior is normal. Judgment and thought content normal.   Flat affect.   Vitals reviewed.      Recent Labs      08/04/17   1153   WBC  7.3   RBC  3.77*   HEMOGLOBIN  12.5*   HEMATOCRIT  36.2*   MCV  96.0   MCH  33.2*   MCHC  34.5   RDW  44.7   PLATELETCT  255   MPV  9.4     Recent Labs      08/04/17   1153   SODIUM  132*   POTASSIUM  4.2   CHLORIDE  99   CO2  27   GLUCOSE  173*   BUN  19   CREATININE  0.82   CALCIUM  8.5     Recent Labs      08/03/17   0148  08/04/17   0336  08/05/17   0148   INR  3.88*  3.06*  2.92*                  Assessment/Plan     Syncope (present on admission)  Assessment & Plan  - likely more related to low BP. Unclear if intermittent capture of PPM contributed. S/p readjustment of PPM.   - lisinopril and metoprolol discontinued. Continue lower dose of aldactone and lasix. Continue close BP monitoring.   - continue to monitor on telemetry.  - will need SNF placement. Continue PT/OT while in-house.     Cardiac pacemaker in situ (present on admission)  Assessment & Plan  - With intermittent non-capture. S/p re-adjustment of leads, improved capture.   - continue to monitor on telemetry.     Elevated troponin (present on admission)  Assessment & Plan  - likely demand ischemia. Peaked.   - Continue to monitor on telemetry.     Constipation (present on admission)  Assessment & Plan  - continue scheduled miralax, and PRN bowel protocol.     Chronic congestive heart failure (CMS-[facility]) (present on admission)  Assessment & Plan  - Not in acute failure.  - Continue lower dose of furosemide and lower dose of aldactone.   - Unable to restart lisinopril, and metoprolol due to low BP causing syncopal episodes.     Chronic A-fib (CMS-[facility]) (present on admission)  Assessment & Plan  - rate controlled. Continue Coumadin per pharmacy dosing. Metoprolol discontinued for low BP.   - continue to monitor on telemetry.     S/P AVR (aortic valve replacement)  (present on admission)  Assessment & Plan  - Continue Coumadin.    Acquired circulating anticoagulants (CMS-HCC) (present on admission)  Assessment & Plan  - Continue Coumadin for Afib.     Hypothyroidism (present on admission)  Assessment & Plan  - TSH WNL.  - Continue synthroid.     CKD (chronic kidney disease), stage III (present on admission)  Assessment & Plan  - stable at baseline.       Medications reviewed, Radiology images reviewed and Labs reviewed  Wasserman catheter: No Wasserman      DVT Prophylaxis: Warfarin (Coumadin)        Assessed for rehab: Patient was assess for and/or received rehabilitation services during this hospitalization

## 2017-08-05 NOTE — CARE PLAN
Problem: Communication  Goal: The ability to communicate needs accurately and effectively will improve  Outcome: PROGRESSING AS EXPECTED  POC discussed at bedside. Patient verbalizes understanding. Education on medications provided. White board updated, patient encouraged to call for all needs. Calls appropriately. No immediate concerns at this time.    Problem: Safety  Goal: Will remain free from injury  Outcome: PROGRESSING AS EXPECTED  Bed in lowest position. Educated patient on use of call light.

## 2017-08-05 NOTE — PROGRESS NOTES
Report received, pt care assumed, tele box on. VSS, pt assessment complete. Pt aaox 3, disoriented to date, no signs of distress noted at this time. POC discussed with pt and verbalizes no questions. Pt c/o of no pain, PRN pain med not administered. Pt denies any additional needs at this time. Bed in lowest position, bed alarm on, pt educated on fall risk and verbalized understanding, call light within reach, will continue to monitor.

## 2017-08-06 PROBLEM — R55 SYNCOPE: Status: RESOLVED | Noted: 2017-07-31 | Resolved: 2017-08-06

## 2017-08-06 LAB
GLUCOSE BLD-MCNC: 121 MG/DL (ref 65–99)
GLUCOSE BLD-MCNC: 149 MG/DL (ref 65–99)
GLUCOSE BLD-MCNC: 174 MG/DL (ref 65–99)
GLUCOSE BLD-MCNC: 183 MG/DL (ref 65–99)
INR PPP: 2.85 (ref 0.87–1.13)
PROTHROMBIN TIME: 30.8 SEC (ref 12–14.6)

## 2017-08-06 PROCEDURE — G0378 HOSPITAL OBSERVATION PER HR: HCPCS

## 2017-08-06 PROCEDURE — 85610 PROTHROMBIN TIME: CPT

## 2017-08-06 PROCEDURE — 700102 HCHG RX REV CODE 250 W/ 637 OVERRIDE(OP): Performed by: HOSPITALIST

## 2017-08-06 PROCEDURE — 82962 GLUCOSE BLOOD TEST: CPT

## 2017-08-06 PROCEDURE — A9270 NON-COVERED ITEM OR SERVICE: HCPCS

## 2017-08-06 PROCEDURE — 700102 HCHG RX REV CODE 250 W/ 637 OVERRIDE(OP)

## 2017-08-06 PROCEDURE — A9270 NON-COVERED ITEM OR SERVICE: HCPCS | Performed by: HOSPITALIST

## 2017-08-06 PROCEDURE — 36415 COLL VENOUS BLD VENIPUNCTURE: CPT

## 2017-08-06 PROCEDURE — A9270 NON-COVERED ITEM OR SERVICE: HCPCS | Performed by: INTERNAL MEDICINE

## 2017-08-06 PROCEDURE — 700102 HCHG RX REV CODE 250 W/ 637 OVERRIDE(OP): Performed by: INTERNAL MEDICINE

## 2017-08-06 PROCEDURE — 99225 PR SUBSEQUENT OBSERVATION CARE,LEVEL II: CPT | Performed by: INTERNAL MEDICINE

## 2017-08-06 RX ORDER — FUROSEMIDE 40 MG/1
40 TABLET ORAL DAILY
Qty: 30 TAB
Start: 2017-08-06 | End: 2019-08-16

## 2017-08-06 RX ORDER — POLYETHYLENE GLYCOL 3350 17 G/17G
17 POWDER, FOR SOLUTION ORAL DAILY
Refills: 3
Start: 2017-08-06

## 2017-08-06 RX ADMIN — TAMSULOSIN HYDROCHLORIDE 0.4 MG: 0.4 CAPSULE ORAL at 08:28

## 2017-08-06 RX ADMIN — STANDARDIZED SENNA CONCENTRATE AND DOCUSATE SODIUM 2 TABLET: 8.6; 5 TABLET, FILM COATED ORAL at 08:27

## 2017-08-06 RX ADMIN — STANDARDIZED SENNA CONCENTRATE AND DOCUSATE SODIUM 2 TABLET: 8.6; 5 TABLET, FILM COATED ORAL at 21:09

## 2017-08-06 RX ADMIN — FINASTERIDE 5 MG: 5 TABLET, FILM COATED ORAL at 08:27

## 2017-08-06 RX ADMIN — WARFARIN SODIUM 5 MG: 5 TABLET ORAL at 17:29

## 2017-08-06 RX ADMIN — FUROSEMIDE 40 MG: 40 TABLET ORAL at 08:27

## 2017-08-06 RX ADMIN — INSULIN LISPRO 2 UNITS: 100 INJECTION, SOLUTION INTRAVENOUS; SUBCUTANEOUS at 17:31

## 2017-08-06 RX ADMIN — LEVOTHYROXINE SODIUM 100 MCG: 100 TABLET ORAL at 08:27

## 2017-08-06 RX ADMIN — INSULIN LISPRO 2 UNITS: 100 INJECTION, SOLUTION INTRAVENOUS; SUBCUTANEOUS at 12:10

## 2017-08-06 RX ADMIN — SPIRONOLACTONE 12.5 MG: 25 TABLET, FILM COATED ORAL at 08:28

## 2017-08-06 RX ADMIN — POLYETHYLENE GLYCOL 3350 1 PACKET: 17 POWDER, FOR SOLUTION ORAL at 08:27

## 2017-08-06 ASSESSMENT — PAIN SCALES - GENERAL
PAINLEVEL_OUTOF10: 0

## 2017-08-06 NOTE — PROGRESS NOTES
[facility] Hospitalist Progress Note    Date of Service: 2017    Chief Complaint  89 y.o. male with T2DM, h/o chronic systolic CHF, hypothyroidism, h/o CVA and chronic afib, status post BiV pacemaker on chronic Coumadin, bioprosthetic aortic valve replacement for aortic stenosis, previously admitted at [facility] two days ago when he had his pacemaker changed by EP. He was then discharged. His family reported that the patient has not been himself since. They saw him the night before where he was in bed. In the morning if found him on the floor confused. Admitted for syncope. Interrogation of pacemaker showed intermittent non-capture. EP saw pt, LV lead vector was changed and now has capture at 1.75 volts, RV lead has excellent capture down to 0.5V. Cards consulted.  Last echo showed EF 30%, Grade III-IV diastolic dysfunction (restrictive pattern), with known bioprosthetic aortic valve with increased gradient. Cards discontinued metoprolol and lisinopril as relatively contraindicated in light of hypotension and syncope. Aldactone decreased to 12.5mg daily, with decreased lasix. PT/OT recommending SNF placement, patient amenable to that. SNF placement underway.       Interval Problem Update  2017 - uneventful night. VSS. Afebrile. Saturating well on RA. INR 2.85. Accepted by [facility].     > Seen and examined. No complaints. Eating breakfast independently. No Cp, SOB, N/V, abd pain. Per RN and records, last BM was on 17.           Consultants/Specialty  Cardiology/EP    Disposition  Monitor on telemetry. Await SNF placement/insurance authorization.      ROS     Pertinent positives/negatives as mentioned above.     A complete review of systems was done. All other systems were negative.      Physical Exam  Laboratory/Imaging   Hemodynamics  Temp (24hrs), Av.8 °C (98.2 °F), Min:36.4 °C (97.6 °F), Max:37.2 °C (99 °F)   Temperature: 36.8 °C (98.3 °F)  Pulse  Av.8  Min: 64  Max: 114    Blood Pressure :  114/69 mmHg      Respiratory      Respiration: 16, Pulse Oximetry: 99 %        RUL Breath Sounds: Clear, RML Breath Sounds: Clear, RLL Breath Sounds: Diminished, WILY Breath Sounds: Clear, LLL Breath Sounds: Diminished    Fluids    Intake/Output Summary (Last 24 hours) at 08/06/17 0727  Last data filed at 08/05/17 1800   Gross per 24 hour   Intake      0 ml   Output   1100 ml   Net  -1100 ml       Nutrition  Orders Placed This Encounter   Procedures   • DIET ORDER     Standing Status: Standing      Number of Occurrences: 1      Standing Expiration Date:      Order Specific Question:  Diet:     Answer:  Regular [1]     Order Specific Question:  Texture/Fiber modifications:     Answer:  Dysphagia 3(Mechanical Soft)specify fluid consistency(question 6) [3]     Physical Exam   Constitutional: He is oriented to person, place, and time. He appears well-developed. No distress.   HENT:   Head: Normocephalic and atraumatic.   Mouth/Throat: Oropharynx is clear and moist. No oropharyngeal exudate.   Eyes: EOM are normal. Pupils are equal, round, and reactive to light. Right eye exhibits no discharge. Left eye exhibits no discharge. No scleral icterus.   Neck: Normal range of motion. Neck supple. No thyromegaly present.   Cardiovascular: Normal rate.  Exam reveals no gallop and no friction rub.    No murmur heard.  Irregular rhythm   Pulmonary/Chest: Effort normal and breath sounds normal. He has no wheezes. He has no rales. He exhibits no tenderness.   (+) PPM on the right upper chest, with healing bruising.   Abdominal: Soft. Bowel sounds are normal. There is no tenderness. There is no rebound and no guarding.   Musculoskeletal: Normal range of motion. He exhibits no edema or tenderness.   Lymphadenopathy:     He has no cervical adenopathy.   Neurological: He is alert and oriented to person, place, and time.   Answers questions appropriately. Coherent, and conversant.   Skin: Skin is warm and dry. He is not diaphoretic. No  erythema.   (+) bruise on the anterior chest wall   Psychiatric: His behavior is normal. Judgment and thought content normal.   Flat affect.   Vitals reviewed.      Recent Labs      08/04/17   1153   WBC  7.3   RBC  3.77*   HEMOGLOBIN  12.5*   HEMATOCRIT  36.2*   MCV  96.0   MCH  33.2*   MCHC  34.5   RDW  44.7   PLATELETCT  255   MPV  9.4     Recent Labs      08/04/17   1153   SODIUM  132*   POTASSIUM  4.2   CHLORIDE  99   CO2  27   GLUCOSE  173*   BUN  19   CREATININE  0.82   CALCIUM  8.5     Recent Labs      08/04/17   0336  08/05/17   0148  08/06/17   0200   INR  3.06*  2.92*  2.85*                  Assessment/Plan     Cardiac pacemaker in situ (present on admission)  Assessment & Plan  - With intermittent non-capture. S/p re-adjustment of leads, improved capture.   - D/C telemetry.    Elevated troponin (present on admission)  Assessment & Plan  - likely demand ischemia. Peaked.   - Continue to monitor on telemetry.     Constipation (present on admission)  Assessment & Plan  - continue scheduled miralax, and PRN bowel protocol.     Chronic systolic (congestive) heart failure (CMS-HCC) (present on admission)  Assessment & Plan  - Not in acute failure.  - Continue lower dose of furosemide and lower dose of aldactone.   - Unable to restart lisinopril, and metoprolol due to low BP causing syncopal episodes.     Chronic A-fib (CMS-HCC) (present on admission)  Assessment & Plan  - rate controlled. Continue Coumadin per pharmacy dosing. Metoprolol discontinued for low BP.     S/P AVR (aortic valve replacement) (present on admission)  Assessment & Plan  - Continue Coumadin.    Acquired circulating anticoagulants (CMS-HCC) (present on admission)  Assessment & Plan  - Continue Coumadin for Afib.     Hypothyroidism (present on admission)  Assessment & Plan  - TSH WNL.  - Continue synthroid.     CKD (chronic kidney disease), stage III (present on admission)  Assessment & Plan  - stable at baseline.       Medications  reviewed, Radiology images reviewed and Labs reviewed  Wasserman catheter: No Wasserman      DVT Prophylaxis: Warfarin (Coumadin)        Assessed for rehab: Patient was assess for and/or received rehabilitation services during this hospitalization

## 2017-08-06 NOTE — DISCHARGE PLANNING
Medical Social Work    Pt medically cleared to transfer to SNF today. NIURKA called [facility] admissions at [phone]. Myriam reported that they are still pending insurance auth. She reported that she would call over to [facility], but that the agency was most likely closed on Sundays.     NIURKA received call from Myriam from [facility] who reported that she attempted to call over to [facility] and it was confirmed that agency is closed. NIURKA notified attending nurse and hospitalist RN.    PASRR completed ([id number]).     Plan: Transfer to [facility] once once they have obtained insurance auth.

## 2017-08-06 NOTE — PROGRESS NOTES
Acceptance to [facility] still pending insurance authorization, Insurance company is closed today per , will readdress tomorrow

## 2017-08-06 NOTE — PROGRESS NOTES
Inpatient Anticoagulation Service Note    Date: 8/6/2017  Reason for Anticoagulation: Atrial Fibrillation  Hemoglobin Value: 12.5  Hematocrit Value: 36.2  Lab Platelet Value: 255  Target INR: 2.0 to 3.0  INR from last 7 days     Date/Time INR Value    08/06/17 0200 (!)2.85    08/05/17 0148 (!)2.92    08/04/17 0336 (!)3.06    08/03/17 0148 (!)3.88    08/02/17 0140 (!)4.92    08/01/17 0351 (!)3.76    07/31/17 1810 (!)3.11        Dose from last 7 days     Date/Time Dose (mg)    08/06/17 1300 5    08/05/17 1300 5    08/04/17 1400 5    08/03/17 0900 2    08/02/17 1100 0    08/01/17 1100 0    07/31/17 2135 5        Home dose = 7.5 mg on MWF and 5 mg all other days  Significant Interactions: Thyroid Medications, spironolactone    Comments: INR within goal. No s/s of bleedig noted. No new interactions. Continue with 5 mg daily. I will go to Mon, Wed, Fri INR draws.    Plan: 5 mg daily, INR for AM  Education Material Provided?: No, chronic warfarin patient  Pharmacist suggested discharge dosing: warfarin 5 mg daily. Recommend patient have INR checked 2-3 days after discharge.     Nieves Miranda, PharmD

## 2017-08-06 NOTE — DISCHARGE SUMMARY
HOSPITAL MEDICINE DISCHARGE SUMMARY    Name: Monster Miramontes  MRN: [id number]  : 1928  Admit Date: 2017  Discharge Date: 2017  Attending Provider: Hira Lutz M.D.  Admitting Provider: Landry Sanderson M.D.  Primary Care Physician: Jose Hayes M.D.    DISCHARGE DIAGNOSES:   Active Problems:    Cardiac pacemaker in situ POA: Yes    Elevated troponin POA: Yes    Constipation POA: Yes    Chronic systolic (congestive) heart failure (CMS-HCC) POA: Yes    Chronic A-fib (CMS-HCC) POA: Yes    S/P AVR (aortic valve replacement) POA: Yes    Acquired circulating anticoagulants (CMS-HCC) POA: Yes    Hypothyroidism POA: Yes    CKD (chronic kidney disease), stage III POA: Yes  Resolved Problems:    Syncope due to hypotension POA: Yes        SUMMARY OF EVENTS LEADING TO ADMISSION:   89 y.o. male with Type 2 diabetes mellitus, h/o chronic systolic CHF, hypothyroidism, h/o CVA and chronic afib, status post BiV pacemaker on chronic Coumadin, bioprosthetic aortic valve replacement for aortic stenosis, previously admitted at [facility] two days ago when he had his pacemaker changed by EP. He was then discharged. His family reported that the patient has not been himself since. They saw him the night before where he was in bed. In the morning they found him on the floor confused.     For further details of history of present illness, past medical/social/family histories, allergies and medication, please refer to copy of admission note by Dr. Landry Sanderson M.D. on 17.     HOSPITAL COURSE:   The patient was admitted to the hospitalist service after being initially evaluated in the ED. He was admitted for syncope. Interrogation of pacemaker showed intermittent non-capture. EP saw pt, and LV lead vector was changed and now has capture at 1.75 volts, RV lead has excellent capture down to 0.5V. Cards consulted.  Last echo showed EF 30%, Grade III-IV diastolic dysfunction (restrictive  pattern), with known bioprosthetic aortic valve with increased gradient. He did have elevated troponin, which was felt to be demand ischemia. Cards discontinued metoprolol and lisinopril as relatively contraindicated in light of hypotension and syncope. Aldactone was decreased to 12.5mg daily, with decreased lasix.     His BP trend improved, and he remained hemodynamically stable and afebrile. PT/OT evaluation was done, and recommended SNF placement, with which patient was amenable to that.       With his clinical improvement, he was deemed ready to be discharged from the hospital as he did not have any further inpatient needs. Patient felt comfortable going to the SNF. The discharge plan was discussed with the patient, and he was agreeable to it.     The patient was subsequently sent to the SNF in improved and stable condition.     FOLLOW-UP ISSUES:   1. Syncope - likely more related to low BP. Unclear if intermittent capture of PPM contributed. S/p readjustment of PPM.    - lisinopril and metoprolol discontinued. Continue lower dose of aldactone and lasix.    - continue PT/OT at the SNF.     2. Chronic congestive heart failure - Continue lower dose of furosemide and lower dose of aldactone. Unable to restart lisinopril, and metoprolol due to low BP causing syncopal episodes.     3. Chronic A-fib - Continue Coumadin. Metoprolol discontinued for low BP.       CHANGES IN MANAGEMENT OF CHRONIC CONDITION: As above.     PENDING TESTS: none    FOLLOW-UP TESTS ORDERED POST DISCHARGE: none    DISCHARGE MEDICATIONS:   Medication Sig Start Date End Date   furosemide (LASIX) 40 MG Tab Take 1 Tab by mouth every day. 8/6/17    polyethylene glycol/lytes (MIRALAX) Pack Take 1 Packet by mouth every day. 8/6/17    betamethasone dipropionate 0.05 % lotion Apply 1 Application to affected area(s) every bedtime. 3/30/17    finasteride (PROSCAR) 5 MG TABS Take 5 mg by mouth every day.     spironolactone (ALDACTONE) 25 MG TABS Take 12.5  mg by mouth every day.     glimepiride (AMARYL) 2 MG TABS Take 2 mg by mouth every morning.     levothyroxine (SYNTHROID) 100 MCG TABS Take 100 mcg by mouth every day.     tamsulosin (FLOMAX) 0.4 MG capsule Take 0.4 mg by mouth ONE-HALF HOUR AFTER BREAKFAST.     warfarin (COUMADIN) 5 MG TABS Take 5-7.5 mg by mouth every day. 7.5mg on Monday/Wednesday/Friday, 5mg on all other days         DIET:   Orders Placed This Encounter   Procedures   • DIET ORDER     Standing Status: Standing      Number of Occurrences: 1      Standing Expiration Date:      Order Specific Question:  Diet:     Answer:  Regular [1]     Order Specific Question:  Texture/Fiber modifications:     Answer:  Dysphagia 3(Mechanical Soft)specify fluid consistency(question 6) [3]        CODE STATUS: Full Code    FOLLOW-UP APPOINTMENTS:   1. SNF physician to follow at the SNF    No follow-up provider specified.      For further details on discharge medications, patient education, diet, and activity, please refer to electronic copy of discharge instructions.       TIME SPENT: 42 minutes, with greater than 50% of the time spent on face-to-face encounter, addressing medical issues, coordination of care, counseling, discharge planning, medication reconciliation, and documentation.

## 2017-08-06 NOTE — PROGRESS NOTES
Assumed pt care and received report at bedside, A&O x3 and disoriented to time and denies pain at this time. Pt is resting in bed. Pt is re-educated about using call light when assistance is needed. Tele monitor and in place, bed alarm on, non-skid socks in place, bed in lowest position, upper side rails up, call light within reach.

## 2017-08-06 NOTE — PROGRESS NOTES
Report received at bedside, assumed care of patient. Patient A&O x 3, no signs of distress noted. All LDAs assessed. Discussed POC with patient and all questions answered at this time. Denies pain at this time. Bed alarm on. Bed in lowest position, upper side rails up. Non-skid socks in place. Call light within reach. Personal possessions in reach. Will continue to monitor pt status.

## 2017-08-06 NOTE — CARE PLAN
Problem: Bowel/Gastric:  Goal: Normal bowel function is maintained or improved  Outcome: PROGRESSING SLOWER THAN EXPECTED  Patient given stool softeners to help have bowel movement before transfer, patient states no need to have bowel movement at this time    Problem: Discharge Barriers/Planning  Goal: Patient’s continuum of care needs will be met  Outcome: PROGRESSING SLOWER THAN EXPECTED  Spoke with  about patient placement, patient accepted to [facility] pending insurance, patient and daughter updated about possible transfer tomorrow, patient and family verbalize understanding

## 2017-08-06 NOTE — CARE PLAN
Problem: Safety  Goal: Will remain free from falls  Intervention: Implement fall precautions    08/05/17 0800 08/05/17 2000   OTHER   Environmental Precautions --  Treaded Slipper Socks on Patient;Personal Belongings, Wastebasket, Call Bell etc. in Easy Reach;Report Given to Other Health Care Providers Regarding Fall Risk;Bed in Low Position;Communication Sign for Patients & Families;Mobility Assessed & Appropriate Sign Placed   IV Pole on Same Side of Bed as Bathroom (IV SL) --    Bedrails --  Bedrails Closest to Bathroom Down   Chair/Bed Strip Alarm --  Yes - Alarm On   Bed Alarm (Built in - for ICU ONLY) Yes - Alarm On --

## 2017-08-06 NOTE — CARE PLAN
Problem: Skin Integrity  Goal: Risk for impaired skin integrity will decrease  Intervention: Implement precautions to protect skin integrity in collaboration with the interdisciplinary team    08/05/17 2000   OTHER   Skin Preventative Measures Pillows in Use for Support / Positioning   Bed Types Pressure Redistribution Mattress (Atmosair)   Friction Interventions Draw Sheet / Pad Used for Repositioning   Moisturizers Barrier Cream;Moisturizer    PT / OT Involved in Care Physical Therapy Involved;Occupational Therapy Involved   Activity  Bed;Edge of bed;Up to bathroom   Patient Turns / Repositioning Patient Turns Self from Side to Side   Assistance / Tolerance for Turning/Repositioning Standby Assist   Patient is Receiving Nutrition Oral Intake Adequate   Vitamin Therapy in Use No

## 2017-08-06 NOTE — WOUND TEAM
Wound consult placed for BLE.  Wound care has already seen and dressing orders in place.  Spoke to RN who advised wound are looking well.  RN does not feel wound care is needed for any change in wound status.  No other needs at this time.  Please re-consult if any other needs come up.

## 2017-08-07 VITALS
HEIGHT: 75 IN | SYSTOLIC BLOOD PRESSURE: 120 MMHG | OXYGEN SATURATION: 95 % | HEART RATE: 70 BPM | DIASTOLIC BLOOD PRESSURE: 73 MMHG | BODY MASS INDEX: 20.37 KG/M2 | TEMPERATURE: 98.3 F | RESPIRATION RATE: 18 BRPM | WEIGHT: 163.8 LBS

## 2017-08-07 LAB
GLUCOSE BLD-MCNC: 155 MG/DL (ref 65–99)
GLUCOSE BLD-MCNC: 157 MG/DL (ref 65–99)
INR PPP: 2.22 (ref 0.87–1.13)
PROTHROMBIN TIME: 25.3 SEC (ref 12–14.6)

## 2017-08-07 PROCEDURE — G0378 HOSPITAL OBSERVATION PER HR: HCPCS

## 2017-08-07 PROCEDURE — 99217 PR OBSERVATION CARE DISCHARGE: CPT | Performed by: INTERNAL MEDICINE

## 2017-08-07 PROCEDURE — A6209 FOAM DRSG <=16 SQ IN W/O BDR: HCPCS | Performed by: INTERNAL MEDICINE

## 2017-08-07 PROCEDURE — 82962 GLUCOSE BLOOD TEST: CPT

## 2017-08-07 PROCEDURE — A9270 NON-COVERED ITEM OR SERVICE: HCPCS | Performed by: HOSPITALIST

## 2017-08-07 PROCEDURE — 36415 COLL VENOUS BLD VENIPUNCTURE: CPT

## 2017-08-07 PROCEDURE — 85610 PROTHROMBIN TIME: CPT

## 2017-08-07 PROCEDURE — A9270 NON-COVERED ITEM OR SERVICE: HCPCS | Performed by: INTERNAL MEDICINE

## 2017-08-07 PROCEDURE — 700102 HCHG RX REV CODE 250 W/ 637 OVERRIDE(OP): Performed by: HOSPITALIST

## 2017-08-07 PROCEDURE — 700102 HCHG RX REV CODE 250 W/ 637 OVERRIDE(OP): Performed by: INTERNAL MEDICINE

## 2017-08-07 RX ADMIN — INSULIN LISPRO 2 UNITS: 100 INJECTION, SOLUTION INTRAVENOUS; SUBCUTANEOUS at 05:32

## 2017-08-07 RX ADMIN — LEVOTHYROXINE SODIUM 100 MCG: 100 TABLET ORAL at 08:16

## 2017-08-07 RX ADMIN — TAMSULOSIN HYDROCHLORIDE 0.4 MG: 0.4 CAPSULE ORAL at 08:16

## 2017-08-07 RX ADMIN — POLYETHYLENE GLYCOL 3350 1 PACKET: 17 POWDER, FOR SOLUTION ORAL at 08:17

## 2017-08-07 RX ADMIN — FUROSEMIDE 40 MG: 40 TABLET ORAL at 08:16

## 2017-08-07 RX ADMIN — FINASTERIDE 5 MG: 5 TABLET, FILM COATED ORAL at 08:16

## 2017-08-07 RX ADMIN — SPIRONOLACTONE 12.5 MG: 25 TABLET, FILM COATED ORAL at 08:16

## 2017-08-07 RX ADMIN — INSULIN LISPRO 2 UNITS: 100 INJECTION, SOLUTION INTRAVENOUS; SUBCUTANEOUS at 12:01

## 2017-08-07 RX ADMIN — STANDARDIZED SENNA CONCENTRATE AND DOCUSATE SODIUM 2 TABLET: 8.6; 5 TABLET, FILM COATED ORAL at 08:16

## 2017-08-07 ASSESSMENT — PAIN SCALES - GENERAL
PAINLEVEL_OUTOF10: 0

## 2017-08-07 NOTE — PROGRESS NOTES
Received report and assumed care of patient. Patient is alert and oriented x3. Patient is in no signs of distress and denies pain at this time. Patient was updated on the plan of care for the day. Call light within reach, bed in low position, 2 side rails up. Educated on fall risk. Will continue to monitor

## 2017-08-07 NOTE — PROGRESS NOTES
Inpatient Anticoagulation Service Note    Date: 8/7/2017  Reason for Anticoagulation: Atrial Fibrillation  Hemoglobin Value: 12.5  Hematocrit Value: 36.2  Lab Platelet Value: 255  Target INR: 2.0 to 3.0  INR from last 7 days     Date/Time INR Value    08/06/17 0200 (!)2.85    08/05/17 0148 (!)2.92    08/04/17 0336 (!)3.06    08/03/17 0148 (!)3.88    08/02/17 0140 (!)4.92    08/01/17 0351 (!)3.76    07/31/17 1810 (!)3.11        Dose from last 7 days     Date/Time Dose (mg)    08/07/17 1200 5    08/06/17 1300 5    08/05/17 1300 5    08/04/17 1400 5    08/03/17 0900 2    08/02/17 1100 0    08/01/17 1100 0    07/31/17 2135 5        Home dose = 7.5 mg on MWF and 5 mg all other days  Significant Interactions: Thyroid Medications, Spironolactone    Comments: Patient being discharged today. No INR was collected today. Patient's INR has been within goal. I recommend 5 mg daily and check an INR tomorrow at outside facility. No s/s of bleeding noted. No new interactions.    Plan:  5 mg daily  Education Material Provided?: No, chronic warfarin patient  Pharmacist suggested discharge dosing: warfarin 5 mg daily. Recommend patient have INR checked 2-3 days after discharge.     Nieves Miranda, BurkeD

## 2017-08-07 NOTE — DISCHARGE PLANNING
Received call from Layo with [email or web address]. Transportation arranged for pickup via [email or web address] van at 1400. Notified NIURKA Salcedo via phone. Faxed DC addendum to [email or web address] at [phone].

## 2017-08-07 NOTE — DISCHARGE PLANNING
Received call back from [employer] [facility], spoke to Layo they have auth. Notified NIURKA Salcedo via phone. Faxed DC summary to [employer] [facility] at [phone].

## 2017-08-07 NOTE — CARE PLAN
Problem: Safety  Goal: Will remain free from injury  Outcome: PROGRESSING AS EXPECTED  Bed in low/locked position with alarm on.  Call light in reach.

## 2017-08-07 NOTE — CARE PLAN
Problem: Safety  Goal: Will remain free from falls  Outcome: PROGRESSING AS EXPECTED  Pt assessed for fall risk. Pt has history of falling. Proper precautions in place. Treaded slipper socks on pt. Bed in low position, wheels locked, side rails up x2, call light within reach.     Problem: Skin Integrity  Goal: Risk for impaired skin integrity will decrease  Outcome: PROGRESSING AS EXPECTED  Pt monitored for skin breakdown. Waffle cushion in place. Barrier cream used on pt. Dressing changes done on pt wounds daily.

## 2017-08-07 NOTE — PROGRESS NOTES
Discharge instructions given to patient. Discharge instructions given to patient at bedside. New and home medication review, post-discharge activity level and worsening of symptoms needing follow-up care discussed. Telemetry monitor/IV cathlon removed. All belongings accounted for, all questions answered at this time. Patient escorted out with [facility] transport employee Kristi Schoenrock (shown [place] ID and drivers license) via wheelchair.

## 2017-08-07 NOTE — DISCHARGE PLANNING
Received email to follow up with referrals.  [facility] has accepted, contacted left message for a call back.  Contacted [facility] to see if they have auth. Left message.

## 2017-08-07 NOTE — DISCHARGE PLANNING
Medical Social Work    Received update from [facility] that pt has been accepted to [facility]. Updated bedside RN and hospitalist RN that pt will likely d/c soon- awaiting time confirmation.

## 2017-08-07 NOTE — PROGRESS NOTES
Assumed care of patient.  Patient is alert and pleasant.  Forgetful of time and situation.  Cooperative.  No complaints of pain or distress.  Bed in low/locked position with alarm on.  Call light in reach.  Plan of care discussed.  Will continue to monitor.

## 2017-08-07 NOTE — PROGRESS NOTES
Pt sitting up in bed eating lunch. Discharge instructions printed and waiting for pt to be picked up via [facility]. No complaints of pain or discomfort. Call light within reach.

## 2017-08-07 NOTE — CARE PLAN
Problem: Skin Integrity  Goal: Risk for impaired skin integrity will decrease  Outcome: PROGRESSING AS EXPECTED  frequent repositioning encouraged.  Patient turns self side to side.

## 2017-08-07 NOTE — PROGRESS NOTES
Called and gave report to RN at [facility]. Questions answered, med list faxed to him per request. Waiting  [facility].

## 2017-11-28 NOTE — ADDENDUM NOTE
Encounter addended by: Carol Raya R.N. on: 11/28/2017 11:36 AM<BR>    Actions taken: Flowsheet accepted

## 2019-08-16 ENCOUNTER — APPOINTMENT (OUTPATIENT)
Dept: RADIOLOGY | Facility: MEDICAL CENTER | Age: 84
End: 2019-08-16
Attending: EMERGENCY MEDICINE
Payer: MEDICARE

## 2019-08-16 ENCOUNTER — HOSPITAL ENCOUNTER (EMERGENCY)
Facility: MEDICAL CENTER | Age: 84
End: 2019-08-17
Attending: EMERGENCY MEDICINE
Payer: MEDICARE

## 2019-08-16 DIAGNOSIS — R41.82 ALTERED MENTAL STATUS, UNSPECIFIED ALTERED MENTAL STATUS TYPE: ICD-10-CM

## 2019-08-16 LAB
ALBUMIN SERPL BCP-MCNC: 3.5 G/DL (ref 3.2–4.9)
ALBUMIN/GLOB SERPL: 1.2 G/DL
ALP SERPL-CCNC: 93 U/L (ref 30–99)
ALT SERPL-CCNC: 10 U/L (ref 2–50)
ANION GAP SERPL CALC-SCNC: 8 MMOL/L (ref 0–11.9)
APPEARANCE UR: ABNORMAL
AST SERPL-CCNC: 19 U/L (ref 12–45)
BACTERIA #/AREA URNS HPF: NEGATIVE /HPF
BASOPHILS # BLD AUTO: 0.6 % (ref 0–1.8)
BASOPHILS # BLD: 0.06 K/UL (ref 0–0.12)
BILIRUB SERPL-MCNC: 1.7 MG/DL (ref 0.1–1.5)
BILIRUB UR QL STRIP.AUTO: NEGATIVE
BUN SERPL-MCNC: 41 MG/DL (ref 8–22)
CALCIUM SERPL-MCNC: 9.1 MG/DL (ref 8.5–10.5)
CAOX CRY #/AREA URNS HPF: ABNORMAL /HPF
CHLORIDE SERPL-SCNC: 100 MMOL/L (ref 96–112)
CO2 SERPL-SCNC: 25 MMOL/L (ref 20–33)
COLOR UR: YELLOW
CREAT SERPL-MCNC: 0.95 MG/DL (ref 0.5–1.4)
EOSINOPHIL # BLD AUTO: 0.18 K/UL (ref 0–0.51)
EOSINOPHIL NFR BLD: 1.7 % (ref 0–6.9)
EPI CELLS #/AREA URNS HPF: NEGATIVE /HPF
ERYTHROCYTE [DISTWIDTH] IN BLOOD BY AUTOMATED COUNT: 49.9 FL (ref 35.9–50)
GLOBULIN SER CALC-MCNC: 2.9 G/DL (ref 1.9–3.5)
GLUCOSE SERPL-MCNC: 195 MG/DL (ref 65–99)
GLUCOSE UR STRIP.AUTO-MCNC: NEGATIVE MG/DL
HCT VFR BLD AUTO: 30.6 % (ref 42–52)
HGB BLD-MCNC: 9.9 G/DL (ref 14–18)
HYALINE CASTS #/AREA URNS LPF: ABNORMAL /LPF
IMM GRANULOCYTES # BLD AUTO: 0.17 K/UL (ref 0–0.11)
IMM GRANULOCYTES NFR BLD AUTO: 1.6 % (ref 0–0.9)
KETONES UR STRIP.AUTO-MCNC: NEGATIVE MG/DL
LEUKOCYTE ESTERASE UR QL STRIP.AUTO: ABNORMAL
LYMPHOCYTES # BLD AUTO: 1.64 K/UL (ref 1–4.8)
LYMPHOCYTES NFR BLD: 15.1 % (ref 22–41)
MCH RBC QN AUTO: 33 PG (ref 27–33)
MCHC RBC AUTO-ENTMCNC: 32.4 G/DL (ref 33.7–35.3)
MCV RBC AUTO: 102 FL (ref 81.4–97.8)
MICRO URNS: ABNORMAL
MONOCYTES # BLD AUTO: 1.3 K/UL (ref 0–0.85)
MONOCYTES NFR BLD AUTO: 12 % (ref 0–13.4)
NEUTROPHILS # BLD AUTO: 7.48 K/UL (ref 1.82–7.42)
NEUTROPHILS NFR BLD: 69 % (ref 44–72)
NITRITE UR QL STRIP.AUTO: NEGATIVE
NRBC # BLD AUTO: 0 K/UL
NRBC BLD-RTO: 0 /100 WBC
PH UR STRIP.AUTO: 5.5 [PH] (ref 5–8)
PLATELET # BLD AUTO: 368 K/UL (ref 164–446)
PMV BLD AUTO: 9.3 FL (ref 9–12.9)
POTASSIUM SERPL-SCNC: 4.1 MMOL/L (ref 3.6–5.5)
PROT SERPL-MCNC: 6.4 G/DL (ref 6–8.2)
PROT UR QL STRIP: NEGATIVE MG/DL
RBC # BLD AUTO: 3 M/UL (ref 4.7–6.1)
RBC # URNS HPF: ABNORMAL /HPF
RBC UR QL AUTO: NEGATIVE
SODIUM SERPL-SCNC: 133 MMOL/L (ref 135–145)
SP GR UR STRIP.AUTO: 1.02
UROBILINOGEN UR STRIP.AUTO-MCNC: 2 MG/DL
WBC # BLD AUTO: 10.8 K/UL (ref 4.8–10.8)
WBC #/AREA URNS HPF: ABNORMAL /HPF

## 2019-08-16 PROCEDURE — 700105 HCHG RX REV CODE 258: Performed by: EMERGENCY MEDICINE

## 2019-08-16 PROCEDURE — 85025 COMPLETE CBC W/AUTO DIFF WBC: CPT

## 2019-08-16 PROCEDURE — 303105 HCHG CATHETER EXTRA

## 2019-08-16 PROCEDURE — 87040 BLOOD CULTURE FOR BACTERIA: CPT | Mod: 91

## 2019-08-16 PROCEDURE — 36415 COLL VENOUS BLD VENIPUNCTURE: CPT

## 2019-08-16 PROCEDURE — 80053 COMPREHEN METABOLIC PANEL: CPT

## 2019-08-16 PROCEDURE — 51702 INSERT TEMP BLADDER CATH: CPT

## 2019-08-16 PROCEDURE — 71045 X-RAY EXAM CHEST 1 VIEW: CPT

## 2019-08-16 PROCEDURE — 81001 URINALYSIS AUTO W/SCOPE: CPT

## 2019-08-16 PROCEDURE — 99285 EMERGENCY DEPT VISIT HI MDM: CPT

## 2019-08-16 RX ORDER — FUROSEMIDE 40 MG/1
20 TABLET ORAL DAILY
COMMUNITY

## 2019-08-16 RX ORDER — ACETAMINOPHEN 500 MG
500-1000 TABLET ORAL 3 TIMES DAILY
COMMUNITY

## 2019-08-16 RX ORDER — SODIUM CHLORIDE 9 MG/ML
INJECTION, SOLUTION INTRAVENOUS CONTINUOUS
Status: DISCONTINUED | OUTPATIENT
Start: 2019-08-16 | End: 2019-08-17 | Stop reason: HOSPADM

## 2019-08-16 RX ADMIN — SODIUM CHLORIDE: 9 INJECTION, SOLUTION INTRAVENOUS at 23:05

## 2019-08-17 VITALS
RESPIRATION RATE: 16 BRPM | DIASTOLIC BLOOD PRESSURE: 68 MMHG | BODY MASS INDEX: 20.39 KG/M2 | TEMPERATURE: 98.3 F | SYSTOLIC BLOOD PRESSURE: 131 MMHG | OXYGEN SATURATION: 94 % | HEIGHT: 75 IN | HEART RATE: 70 BPM | WEIGHT: 164 LBS

## 2019-08-17 NOTE — ED PROVIDER NOTES
ED Provider Note     Scribed for Cornelia Mederos D.O. by Nehemias Stanley. 8/16/2019, 10:21 PM.     Primary care provider: Jose Hayes M.D.  Means of arrival: EMS         History obtained from: Son   History limited by: none    CHIEF COMPLAINT  Chief Complaint   Patient presents with   • ALOC       HPI  Monster Miramontes is a 89 y.o. Male with a history of UTI's who presents to the emergency Department via ambulance for altered level of consciousness onset 9 days ago. Per son, the patient broke his left hip 9 days ago, which required surgical intervention. After the procedure, the patient began having altered mentation. The son initially believed the symptoms were due to the anesthesia, but no urine was ever checked. He now believes his symptoms may be secondary to an UTI. Patient is currently in rehabilitation at [facility]. The son states the patient has had a suprapubic catheter for past 2 years, which has required changing biweekly in order to reduce chances of UTI. About a month ago, the catheter insertion hole closed up so the patient had a minor surgery performed to reopen the hole. The son notes that the patient was supposed to have another surgery 2 days ago to further open the hole. Patient currently has a small catheter in place. 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 Patient denies any pains or symptoms. Patient is allergic to codeine, clonopin, and honey bees.     REVIEW OF SYSTEMS  Pertinent positives include altered mental status. Pertinent negatives include: denies any pain.   See HPI for further details. All other systems are negative.    PAST MEDICAL HISTORY  Past Medical History:   Diagnosis Date   • Arrhythmia    • Atrial fibrillation (HCC)    • Breath shortness    • Cancer (HCC) 2007    bladder   • CATARACT     removed   • Congestive heart failure (HCC)    • Diabetes      oral medication   • Heart murmur    • Hypertension    • Pacemaker    • Snoring    • Stroke (HCC)    • Unspecified disorder of thyroid     hypothyroid   • Unspecified hemorrhagic conditions     takes warfarin   UTI    FAMILY HISTORY  No family history on file.    SOCIAL HISTORY  Social History     Tobacco Use   • Smoking status: Former Smoker     Packs/day: 2.00     Years: 28.00     Pack years: 56.00     Types: Cigarettes     Last attempt to quit: 1972     Years since quittin.9   • Smokeless tobacco: Current User     Types: Chew   Substance Use Topics   • Alcohol use: Yes     Comment: 3 drinks per day   • Drug use: No      Social History     Substance and Sexual Activity   Drug Use No       SURGICAL HISTORY  Past Surgical History:   Procedure Laterality Date   • AORTIC VALVE REPLACEMENT  2012    Performed by Cong Marinelli M.D. at SURGERY [facility] ORS   • RECOVERY  2012    Performed by Cath-Recovery Surgery at SURGERY SAME DAY [facility] ORS   • BLADDER BIOPSY WITH CYSTOSCOPY  7/3/08    Performed by ANDRESSA HANSON at SURGERY [facility] ORS   • TRANS URETHRAL RESECTION BLADDER  7/3/08    Performed by ANDRESSA HANSON at SURGERY [facility] ORS   • PACEMAKER INSERTION         CURRENT MEDICATIONS    Current Facility-Administered Medications:   •  NS infusion, , Intravenous, Continuous, Cornelia Mederos D.O., Stopped at 19 0031    Current Outpatient Medications:   •  acetaminophen (TYLENOL) 500 MG Tab, Take 500-1,000 mg by mouth 3 times a day., Disp: , Rfl:   •  vitamin D (CHOLECALCIFEROL) 1000 UNIT Tab, Take 2,000 Units by mouth every day., Disp: , Rfl:   •  furosemide (LASIX) 40 MG Tab, Take 20 mg by mouth every day., Disp: , Rfl:   •  enoxaparin (LOVENOX) 40 MG/0.4ML Solution inj, Inject 1 Syringe as instructed every bedtime., Disp: , Rfl:   •  insulin regular (HUMULIN R) 100 Unit/mL Solution, Inject 4-12 Units as instructed 4 Times a Day,Before Meals and at Bedtime. Sliding  "Scale 201-250 4 units 251-300 6 units 301-350 8 units 351-400 10 units Over 400 12 units and call MD, Disp: , Rfl:   •  polyethylene glycol/lytes (MIRALAX) Pack, Take 1 Packet by mouth every day., Disp: , Rfl: 3  •  spironolactone (ALDACTONE) 25 MG TABS, Take 12.5 mg by mouth every day., Disp: , Rfl:   •  levothyroxine (SYNTHROID) 100 MCG TABS, Take 100 mcg by mouth every day., Disp: , Rfl:     ALLERGIES  Allergies   Allergen Reactions   • Clonidine Rash             PHYSICAL EXAM  VITAL SIGNS: /69   Pulse 70   Temp 36.8 °C (98.3 °F) (Temporal)   Resp 16   Ht 1.905 m (6' 3\")   Wt 74.4 kg (164 lb)   BMI 20.50 kg/m²     Constitutional: Patient is well developed, well nourished. Non-toxic appearing. No acute distress. Pleasantly confused.  HENT: Normocephalic, atraumatic,  Nose normal with no drainage. Dry mucous membranes.  Eyes: PERRL, EOMI, Conjunctiva without erythema or exudates.   Neck: Supple with no  cervical adenopathy. Normal range of motion in flexion, extension and lateral rotation.    Lymphatic: No lymphadenopathy noted.   Cardiovascular: Normal heart rate and Regular rhythm. No murmur.  Thorax & Lungs: Clear and equal breath sounds with good excursion. No respiratory distress.  Abdomen:  Soft, obese, nontender positive suprapubic catheter in place with clear draining urine.  Skin: Warm, Dry, No erythema, No rashes.   Back: No cervical, thoracic, or lumbosacral tenderness. No CVA tenderness.    Extremities: Peripheral pulses 4/4 No edema, No tenderness   Musculoskeletal: Normal range of motion in all major joints. No tenderness to palpation or major deformities noted.   Neurologic: Alert & oriented, Normal motor function, Normal sensory function,   Psychiatric: Alert , attentive and very pleasant but hard of hearing    DIAGNOSTICS/PROCEDURES    LABS  Results for orders placed or performed during the hospital encounter of 08/16/19   CBC WITH DIFFERENTIAL   Result Value Ref Range    WBC 10.8 4.8 - " 10.8 K/uL    RBC 3.00 (L) 4.70 - 6.10 M/uL    Hemoglobin 9.9 (L) 14.0 - 18.0 g/dL    Hematocrit 30.6 (L) 42.0 - 52.0 %    .0 (H) 81.4 - 97.8 fL    MCH 33.0 27.0 - 33.0 pg    MCHC 32.4 (L) 33.7 - 35.3 g/dL    RDW 49.9 35.9 - 50.0 fL    Platelet Count 368 164 - 446 K/uL    MPV 9.3 9.0 - 12.9 fL    Neutrophils-Polys 69.00 44.00 - 72.00 %    Lymphocytes 15.10 (L) 22.00 - 41.00 %    Monocytes 12.00 0.00 - 13.40 %    Eosinophils 1.70 0.00 - 6.90 %    Basophils 0.60 0.00 - 1.80 %    Immature Granulocytes 1.60 (H) 0.00 - 0.90 %    Nucleated RBC 0.00 /100 WBC    Neutrophils (Absolute) 7.48 (H) 1.82 - 7.42 K/uL    Lymphs (Absolute) 1.64 1.00 - 4.80 K/uL    Monos (Absolute) 1.30 (H) 0.00 - 0.85 K/uL    Eos (Absolute) 0.18 0.00 - 0.51 K/uL    Baso (Absolute) 0.06 0.00 - 0.12 K/uL    Immature Granulocytes (abs) 0.17 (H) 0.00 - 0.11 K/uL    NRBC (Absolute) 0.00 K/uL   COMP METABOLIC PANEL   Result Value Ref Range    Sodium 133 (L) 135 - 145 mmol/L    Potassium 4.1 3.6 - 5.5 mmol/L    Chloride 100 96 - 112 mmol/L    Co2 25 20 - 33 mmol/L    Anion Gap 8.0 0.0 - 11.9    Glucose 195 (H) 65 - 99 mg/dL    Bun 41 (H) 8 - 22 mg/dL    Creatinine 0.95 0.50 - 1.40 mg/dL    Calcium 9.1 8.5 - 10.5 mg/dL    AST(SGOT) 19 12 - 45 U/L    ALT(SGPT) 10 2 - 50 U/L    Alkaline Phosphatase 93 30 - 99 U/L    Total Bilirubin 1.7 (H) 0.1 - 1.5 mg/dL    Albumin 3.5 3.2 - 4.9 g/dL    Total Protein 6.4 6.0 - 8.2 g/dL    Globulin 2.9 1.9 - 3.5 g/dL    A-G Ratio 1.2 g/dL   URINALYSIS CULTURE, IF INDICATED   Result Value Ref Range    Color Yellow     Character Cloudy (A)     Specific Gravity 1.025 <1.035    Ph 5.5 5.0 - 8.0    Glucose Negative Negative mg/dL    Ketones Negative Negative mg/dL    Protein Negative Negative mg/dL    Bilirubin Negative Negative    Urobilinogen, Urine 2.0 Negative    Nitrite Negative Negative    Leukocyte Esterase Trace (A) Negative    Occult Blood Negative Negative    Micro Urine Req Microscopic    ESTIMATED GFR   Result  Value Ref Range    GFR If African American >60 >60 mL/min/1.73 m 2    GFR If Non African American >60 >60 mL/min/1.73 m 2   URINE MICROSCOPIC (W/UA)   Result Value Ref Range    WBC 2-5 (A) /hpf    RBC 0-2 (A) /hpf    Bacteria Negative None /hpf    Epithelial Cells Negative /hpf    Ca Oxalate Crystal Few /hpf    Hyaline Cast 0-2 /lpf       Labs reviewed by me    RADIOLOGY/PROCEDURES  DX-CHEST-PORTABLE (1 VIEW)   Final Result      Stable cardiomegaly. No acute cardiopulmonary abnormality.        Results and radiologist interpretation reviewed by me.     COURSE & MEDICAL DECISION MAKING  Pertinent Labs & Imaging studies reviewed. (See chart for details)    10:07 PM - Patient seen and evaluated at bedside. I informed the patient's son the need for labs and radiology to rule out any emergent processes. Currently awaiting labs and radiology results before deciding if intervention is necessary. Patient's son verbalizes understanding and agreement to this plan of care. Ordered for DX-chest-portable, CBC with differential, CMP> blood culture x2, UA culture to evaluate. Patient will be treated with NS infusion for his symptoms. Differential diagnoses include, but are not limited to, Urosepsis, Pneumonia, dehydration. IV fluids will be ordered for clinical dehydration and altered mental status.     12:34 AM- The patient is improved after resuscitation with IV fluids.  Patient's son had already left to go home and since it was so late in the night I did not contact him with the patient exhibits no signs of sepsis or any type of infection including a normal chest x-ray and normal urine.  He will be sent back to his current living facility in stable condition.       The patient will return for new or worsening symptoms and is stable at the time of discharge.          DISPOSITION:  Patient will be discharged home in stable condition.    FOLLOW UP:  No follow-up provider specified.        FINAL IMPRESSION  1. Altered mental status,  unspecified altered mental status type    2.  Status post recent left hip surgery  3.  Indwelling suprapubic catheter     Nehemias MORENO (Scribe), am scribing for, and in the presence of, Cornelia Mederos D.O..    Electronically signed by: Nehemias Stanley (Scribe), 8/16/2019    Cornelia MORENO D.O. personally performed the services described in this documentation, as scribed by Nehemias Stanley in my presence, and it is both accurate and complete. C.    The note accurately reflects work and decisions made by me.  Cornelia Mederos  8/17/2019  3:00 AM

## 2019-08-17 NOTE — ED NOTES
Reviewed DC instructions with pt. Pt verbalized understanding. DC'd IV, catheter intact. Pt transported to [facility] with all personal items. VSS on room air. Report given to NIKO Mena. Pt son alerted of pt transfer.

## 2019-08-17 NOTE — ED TRIAGE NOTES
Bib ems from [facility]. Per pt son he is normally A/Ox3-4, today he began acting very confused and significantly disoriented. Pt is A/Ox1-2 at upon arrival to ED. Son at bedside. Chart up for ERP

## 2019-08-17 NOTE — DISCHARGE PLANNING
Medical Social Work     NIURKA received a call from the RN requesting SW to assist with [facility] transportation back to [facility]. NIURKA called [facility] and spoke with Jose Angel and advised him that the pt is medically clear and will be transported home. NIURKA faxed a PCS form to [facility] and set up transportation with [facility] for 0200. The transfer packet and [employer] were complete and given to the RN. RN aware of transport time.     Plan: NIURKA will remain available for pt support.

## 2019-08-17 NOTE — ED NOTES
The Medication Reconciliation process has been completed per the MAR from [facility].    Allergies have been reviewed

## 2019-08-22 LAB
BACTERIA BLD CULT: NORMAL
BACTERIA BLD CULT: NORMAL
SIGNIFICANT IND 70042: NORMAL
SIGNIFICANT IND 70042: NORMAL
SITE SITE: NORMAL
SITE SITE: NORMAL
SOURCE SOURCE: NORMAL
SOURCE SOURCE: NORMAL

## 2019-09-30 ENCOUNTER — HOSPITAL ENCOUNTER (EMERGENCY)
Facility: MEDICAL CENTER | Age: 84
End: 2019-09-30
Attending: EMERGENCY MEDICINE
Payer: MEDICARE

## 2019-09-30 VITALS
RESPIRATION RATE: 18 BRPM | DIASTOLIC BLOOD PRESSURE: 83 MMHG | BODY MASS INDEX: 23.03 KG/M2 | OXYGEN SATURATION: 97 % | TEMPERATURE: 97.7 F | HEART RATE: 70 BPM | HEIGHT: 72 IN | WEIGHT: 170 LBS | SYSTOLIC BLOOD PRESSURE: 129 MMHG

## 2019-09-30 DIAGNOSIS — N39.0 URINARY TRACT INFECTION ASSOCIATED WITH INDWELLING URETHRAL CATHETER, SUBSEQUENT ENCOUNTER: ICD-10-CM

## 2019-09-30 DIAGNOSIS — T83.511D URINARY TRACT INFECTION ASSOCIATED WITH INDWELLING URETHRAL CATHETER, SUBSEQUENT ENCOUNTER: ICD-10-CM

## 2019-09-30 LAB
APPEARANCE UR: ABNORMAL
BACTERIA #/AREA URNS HPF: ABNORMAL /HPF
BILIRUB UR QL STRIP.AUTO: NEGATIVE
COLOR UR: YELLOW
EPI CELLS #/AREA URNS HPF: NEGATIVE /HPF
GLUCOSE UR STRIP.AUTO-MCNC: NEGATIVE MG/DL
HYALINE CASTS #/AREA URNS LPF: ABNORMAL /LPF
KETONES UR STRIP.AUTO-MCNC: NEGATIVE MG/DL
LEUKOCYTE ESTERASE UR QL STRIP.AUTO: ABNORMAL
MICRO URNS: ABNORMAL
NITRITE UR QL STRIP.AUTO: POSITIVE
PH UR STRIP.AUTO: 5.5 [PH] (ref 5–8)
PROT UR QL STRIP: 30 MG/DL
RBC # URNS HPF: ABNORMAL /HPF
RBC UR QL AUTO: ABNORMAL
SP GR UR STRIP.AUTO: >=1.03
UROBILINOGEN UR STRIP.AUTO-MCNC: 2 MG/DL

## 2019-09-30 PROCEDURE — 99284 EMERGENCY DEPT VISIT MOD MDM: CPT

## 2019-09-30 PROCEDURE — A9270 NON-COVERED ITEM OR SERVICE: HCPCS | Performed by: EMERGENCY MEDICINE

## 2019-09-30 PROCEDURE — 81001 URINALYSIS AUTO W/SCOPE: CPT

## 2019-09-30 PROCEDURE — 700102 HCHG RX REV CODE 250 W/ 637 OVERRIDE(OP): Performed by: EMERGENCY MEDICINE

## 2019-09-30 RX ORDER — MUPIROCIN CALCIUM 20 MG/G
0.5 CREAM TOPICAL 2 TIMES DAILY
Qty: 1 TUBE | Refills: 0 | Status: SHIPPED | OUTPATIENT
Start: 2019-09-30

## 2019-09-30 RX ORDER — SULFAMETHOXAZOLE AND TRIMETHOPRIM 800; 160 MG/1; MG/1
1 TABLET ORAL ONCE
Status: COMPLETED | OUTPATIENT
Start: 2019-09-30 | End: 2019-09-30

## 2019-09-30 RX ORDER — SULFAMETHOXAZOLE AND TRIMETHOPRIM 800; 160 MG/1; MG/1
1 TABLET ORAL 2 TIMES DAILY
Qty: 20 TAB | Refills: 0 | Status: SHIPPED | OUTPATIENT
Start: 2019-09-30 | End: 2019-09-30 | Stop reason: SDUPTHER

## 2019-09-30 RX ORDER — SULFAMETHOXAZOLE AND TRIMETHOPRIM 800; 160 MG/1; MG/1
1 TABLET ORAL 2 TIMES DAILY
Qty: 20 TAB | Refills: 0 | Status: SHIPPED | OUTPATIENT
Start: 2019-09-30

## 2019-09-30 RX ADMIN — SULFAMETHOXAZOLE AND TRIMETHOPRIM 1 TABLET: 800; 160 TABLET ORAL at 19:16

## 2019-10-01 NOTE — ED TRIAGE NOTES
Pt BIBA from group home when staff called d/t pt's suprapubic catheter having discharge at insertion site and purulent urine. Per EMS pt A&OX2 at baseline. Unable to answer assessment questions ABCs intact.

## 2019-10-01 NOTE — DISCHARGE PLANNING
LSW completed PCS for Pt to travel back to , LSW placed call to [facility] to setup transportation, LSW confirmed 9pm  time at [facility].

## 2019-10-01 NOTE — ED NOTES
Spoke to patient  - he is disoriented and confused. He has dementia at baseline. He will NOT be able to take a taxi. He is wheelchair bound. RN, Payton, attempted to contact son. He did not answer. Manny Miramontes [phone]    This RN called  to help arrange a ride.     The address of the group [facility] is [address] [place] [address]    It is unclear how we get in contact with group home staff.

## 2019-10-01 NOTE — DISCHARGE PLANNING
TRISTENW reached  staff at [facility], ALEJANDRINA informed Pt is a resident at [facility] and  staff would provide/arrange transportation and would call with ETA.

## 2019-10-01 NOTE — DISCHARGE INSTRUCTIONS
Use Bactroban to the area around the catheter.  Twice a day.  Use antibiotics as prescribed.  Please call urology for evaluation and continued follow-up of the suprapubic catheter.

## 2019-10-01 NOTE — ED NOTES
Pt has suprapubic catheter sutured in place with x1 suture. Skin denuded and leaking around catheter.

## 2019-10-01 NOTE — DISCHARGE PLANNING
Medical Social Work    MSW received a call from pt's son, Manny Miramontes who states that he received this workers number from pt's group home [facility] and he's concerned about how pt will get home.  Pt's son states that the group home asked him to travel from [place], [place] to pick pt up at [facility] and bring him back to the group home.  Pt's son was advised that [facility] was arranged for pt to return to group [place] and pt left approximately 10 minutes ago.  Pt's son was grateful for the information.

## 2019-10-01 NOTE — ED NOTES
Attempt to call group home to explain discharge instruction, N/A.  Pt transported back to facility via [facility]. Discharge instructions and written prescription given to [facility] to give to facility.

## 2019-10-01 NOTE — ED PROVIDER NOTES
ED Provider  Scribed for Bob Hutchins D.O. by Jennifer Prieto. 2019  5:36 PM    Means of arrival:Walk-in  History obtained from:Patient  History limited by: confused mental status      CHIEF COMPLAINT  Chief Complaint   Patient presents with   • Device Check     suprapubic catheter       HPI  Monster Miramontes is a 89 y.o. male who presents for a suprapubic catheter check. Per nursing note, the patient resides in a nursing home. The staff at the nursing home reported that the patient is experiencing discharge and mild redness at the insertion site of his suprapubic cather and that the patient has been experiencing purulent urine. He was sent to this facility for concern of possible infection. No reported symptoms of recent fevers or abdominal pain.     Further HPI is limited secondary to patient's confused mental status      REVIEW OF SYSTEMS  See HPI for further details.     Further ROS is limited secondary to patient's confused mental status      PAST MEDICAL HISTORY   has a past medical history of Arrhythmia, Atrial fibrillation (HCC), Breath shortness, Cancer ([facility]) (), CATARACT, Congestive heart failure (HCC), Diabetes, Heart murmur, Hypertension, Pacemaker, Snoring, Stroke (HCC) (), Unspecified disorder of thyroid, and Unspecified hemorrhagic conditions.    SOCIAL HISTORY  Social History     Tobacco Use   • Smoking status: Former Smoker     Packs/day: 2.00     Years: 28.00     Pack years: 56.00     Types: Cigarettes     Last attempt to quit: 1972     Years since quittin.0   • Smokeless tobacco: Current User     Types: Chew   Substance and Sexual Activity   • Alcohol use: Yes     Comment: 3 drinks per day   • Drug use: No     SURGICAL HISTORY   has a past surgical history that includes bladder biopsy with cystoscopy (7/3/08); trans urethral resection bladder (7/3/08); pacemaker insertion (); recovery (2012); and aortic valve replacement (2012).    CURRENT  MEDICATIONS  Home Medications     Reviewed by Payton Castro R.N. (Registered Nurse) on 09/30/19 at 1731  Med List Status: Partial   Medication Last Dose Status   acetaminophen (TYLENOL) 500 MG Tab  Active   enoxaparin (LOVENOX) 40 MG/0.4ML Solution inj  Active   furosemide (LASIX) 40 MG Tab  Active   insulin regular (HUMULIN R) 100 Unit/mL Solution  Active   levothyroxine (SYNTHROID) 100 MCG TABS  Active   polyethylene glycol/lytes (MIRALAX) Pack  Active   spironolactone (ALDACTONE) 25 MG TABS  Active   vitamin D (CHOLECALCIFEROL) 1000 UNIT Tab  Active                ALLERGIES  Allergies   Allergen Reactions   • Clonidine Rash             PHYSICAL EXAM  VITAL SIGNS: /80   Pulse 69   Temp 36.5 °C (97.7 °F) (Temporal)   Resp 18   Ht 1.829 m (6')   Wt 77.1 kg (170 lb)   SpO2 100%   BMI 23.06 kg/m²   Constitutional: Alert in no apparent distress.  HENT: No signs of trauma, mucous membranes are moist  Eyes: Conjunctiva normal, Non-icteric.   Neck: Normal range of motion, No tenderness, Supple.  Lymphatic: No lymphadenopathy noted.   Cardiovascular: Regular rate and rhythm, no murmurs.   Thorax & Lungs: Normal breath sounds, No respiratory distress, No wheezing, No chest tenderness.   Abdomen: Bowel sounds normal, Soft, No tenderness, No masses, No pulsatile masses. No peritoneal signs.  Skin: Warm, Dry, normal color.   Back: No bony tenderness, No CVA tenderness.   Extremities: No edema, No tenderness, No cyanosis  : Suprapubic cathter with sutures holding it in place. Insertion site has mild erythema. Urine has sediment  Musculoskeletal: Good range of motion in all major joints. No tenderness to palpation or major deformities noted.   Neurologic: Disoriented to time and date. Normal motor function, Normal sensory function, No focal deficits noted.   Psychiatric: Affect normal. Disoriented to time and date.       MEDICAL DECISION MAKING  This is a 89 y.o. male who presents with concerns of a suprapubic  catheter.  This suprapubic catheter is sutured into place, to the sutures are removed but is still being held by one suture and is firmly intact.  There is some skin breakdown around the insertion site.  The urine itself is cloudy, and has much sediment.  It appears to be flowing freely.  At this time there is no emergent need for replacement or alteration of this device.  He is being placed on an antibiotic, and given Bactroban for the site of insertion and he is stable for follow-up with urology.    DIAGNOSTIC STUDIES / PROCEDURES    EKG       RADIOLOGY  No orders to display     The radiologist's interpretations of all radiological studies have been reviewed by me.        COURSE  Pertinent Labs & Imaging studies reviewed. (See chart for details)        5:36 PM - Patient seen and examined at bedside. Discussed plan of care. Ordered for UA culture to evaluate his symptoms.         FINAL IMPRESSION  1. Urinary tract infection associated with indwelling urethral catheter, subsequent encounter         Jennifer MORENO (Scribe), am scribing for, and in the presence of, Bob Hutchins D.O..    Electronically signed by: Jennifer Prieto (Scribe), 9/30/2019    Bob MORENO D.O. personally performed the services described in this documentation, as scribed by Jennifer Prieto in my presence, and it is both accurate and complete.    E    The note accurately reflects work and decisions made by me.  Bob Hutchins  9/30/2019  6:35 PM

## 2019-10-01 NOTE — DISCHARGE PLANNING
LSW informed Pt is medically cleared to to discharge, LSW informed Pt resides at  at below address: [facility]     [address] [place] [address] / [phone]    LSW attempted to reach  staff, unable to reach staff, will retry

## 2019-10-01 NOTE — ED NOTES
Polysporin applied around suprapubic site. 4 stitches not intact. Covered with gauze and paper tape to absorb drainage. Bactrim crushed administered with apple sauce.

## 2019-10-07 ENCOUNTER — HOSPITAL ENCOUNTER (EMERGENCY)
Facility: MEDICAL CENTER | Age: 84
End: 2019-10-07
Attending: EMERGENCY MEDICINE
Payer: MEDICARE

## 2019-10-07 VITALS
OXYGEN SATURATION: 94 % | DIASTOLIC BLOOD PRESSURE: 47 MMHG | RESPIRATION RATE: 16 BRPM | WEIGHT: 170 LBS | TEMPERATURE: 97.5 F | SYSTOLIC BLOOD PRESSURE: 105 MMHG | BODY MASS INDEX: 23.06 KG/M2 | HEART RATE: 66 BPM

## 2019-10-07 DIAGNOSIS — T83.010A SUPRAPUBIC CATHETER DYSFUNCTION, INITIAL ENCOUNTER (HCC): ICD-10-CM

## 2019-10-07 DIAGNOSIS — Q54.9 HYPOSPADIAS IN MALE: ICD-10-CM

## 2019-10-07 PROCEDURE — 303105 HCHG CATHETER EXTRA

## 2019-10-07 PROCEDURE — 51702 INSERT TEMP BLADDER CATH: CPT

## 2019-10-07 PROCEDURE — 99285 EMERGENCY DEPT VISIT HI MDM: CPT

## 2019-10-07 NOTE — ED PROVIDER NOTES
ED Provider Note    Scribed for Gaston Burnham MD by Beverly Sanderson. 10/7/2019, 9:25 AM.    Primary care provider: Jose aHyes M.D.  Means of arrival: Ambulance  History obtained from: Nurse and EMS  History limited by: Patient being nonverbal at baseline.     CHIEF COMPLAINT  Chief Complaint   Patient presents with   • Urinary Catheter Problem     pt sent from [facility] for further evaluation because pt pulled out suprapubic catherter this am      HPI  Monster Miramontes is a 89 y.o. male with a history of atrial fibrillation on Lovenox, CHF, diabetes, and pacemaker placement who presents to the Emergency Department for evaluation of his suprapubic catheter. He was noted to have pulled out his catheter while at his nursing facility. Patient is currently residing at [facility]. He is afebrile. Patient is followed by Dr. Pizarro, Urology.     HPI limited by patient being nonverbal at baseline.     REVIEW OF SYSTEMS  Pertinent positives include suprapubic catheter evaluation.   Pertinent negatives include no fever.      ROS limited by patient being nonverbal at baseline.     PAST MEDICAL HISTORY   has a past medical history of Arrhythmia, Atrial fibrillation (HCC), Breath shortness, Cancer (HCC) (), CATARACT, Congestive heart failure (HCC), Diabetes, Heart murmur, Hypertension, Pacemaker, Snoring, Stroke (HCC) (), Unspecified disorder of thyroid, and Unspecified hemorrhagic conditions.    SURGICAL HISTORY   has a past surgical history that includes bladder biopsy with cystoscopy (7/3/08); trans urethral resection bladder (7/3/08); pacemaker insertion (); recovery (2012); and aortic valve replacement (2012).    SOCIAL HISTORY  Social History     Tobacco Use   • Smoking status: Former Smoker     Packs/day: 2.00     Years: 28.00     Pack years: 56.00     Types: Cigarettes     Last attempt to quit: 1972     Years since quittin.1   • Smokeless tobacco:  Current User     Types: Chew   Substance Use Topics   • Alcohol use: Yes     Comment: 3 drinks per day   • Drug use: No      Social History     Substance and Sexual Activity   Drug Use No       FAMILY HISTORY  No family history on file.    CURRENT MEDICATIONS  Home Medications     Reviewed by Angie Redding R.N. (Registered Nurse) on 10/07/19 at 0924  Med List Status: Partial   Medication Last Dose Status   acetaminophen (TYLENOL) 500 MG Tab prn Active   enoxaparin (LOVENOX) 40 MG/0.4ML Solution inj 10/6/2019 Active   furosemide (LASIX) 40 MG Tab 10/7/2019 Active   insulin regular (HUMULIN R) 100 Unit/mL Solution 10/7/2019 Active   levothyroxine (SYNTHROID) 100 MCG TABS 10/7/2019 Active   mupirocin calcium (BACTROBAN) 2 % Cream 10/7/2019 Active   polyethylene glycol/lytes (MIRALAX) Pack 10/7/2019 Active   spironolactone (ALDACTONE) 25 MG TABS 10/7/2019 Active   sulfamethoxazole-trimethoprim (BACTRIM DS) 800-160 MG tablet 10/7/2019 Active   vitamin D (CHOLECALCIFEROL) 1000 UNIT Tab 10/7/2019 Active                ALLERGIES  Allergies   Allergen Reactions   • Clonidine Rash         • Codeine        PHYSICAL EXAM  VITAL SIGNS: /68   Pulse 70   Temp 36.4 °C (97.5 °F) (Temporal)   Resp 16   Wt 77.1 kg (170 lb)   SpO2 98%   BMI 23.06 kg/m²     General: No acute distress, Nonverbal elderly male.   Skin: Warm, dry, normal for ethnicity  Head: Normocephalic, atraumatic  Neck: Trachea midline, no tenderness  Eye: PERRL, normal conjunctiva  ENMT: Oral mucosa moist, no pharyngeal erythema or exudate  Cardiovascular: Regular rate and rhythm, No murmur, Normal peripheral perfusion  Respiratory: Lungs CTA, respirations are non-labored, breath sounds are equal  : Very small stoma noted for suprapubic catheter (accidentally removed), apparently closed, no surrounding urinary drainage nor erythema nor exudate.  Musculoskeletal: No swelling, no deformity  Neurological: Patient is nonverbal at baseline.     DIAGNOSTIC  STUDIES/PROCEDURES    Bladder Catheterization Procedure    Indication: Accidentally removed suprapubic catheter.     Procedure: Unable to pass suprapubic catheter successfully.     The patient tolerated the procedure well.    Complications: Stoma appears to be closed.     COURSE & MEDICAL DECISION MAKING  Pertinent Labs & Imaging studies reviewed. (See chart for details)    Review of medical records shows the patient is scheduled for a suprapubic catheter exchange in 2 days by Dr. Gaytan.     9:25 AM Patient seen and examined at bedside. He agrees with catheter replacement procedure in the ED.     9:49 AM Attempted to place suprapubic catheter without success. Nurse was able to pass gutierrez catheter. Given the upcoming catheter exchange procedure, will consult Dr. Pizarro, Urology.     10:13 AM Consulted Dr. Eisenberg for Dr. Gaytan, who states the placed gutierrez catheter is appropriate for short-term, and he will coordinate with his outpatient team to schedule patient for catheter exchange as a sadly interventional radiology is not acutely available at this point in the ED due to heavy patient volume. Patient was informed of the plan and the nursing facility will be notified of this. His vitals prior to discharge are: /56   Pulse 78   Temp 36.4 °C (97.5 °F) (Temporal)   Resp 16   Wt 77.1 kg (170 lb)   SpO2 98%   BMI 23.06 kg/m²       Decision Making:  This is a 89 y.o. year old male who presents with accidental removal of his indwelling suprapubic catheter.  The stoma site appears to be essentially closed on my exam.  I did attempted replacement with a 10 French catheter that was not successful.  We are able to catheterize the patient's urethra with a Gutierrez catheter however, so there is no active urinary obstruction and will require admission or emergent urologic intervention.  Patient does have a significant hypospadia, I spoke with the on-call urologist who relates due to the chronic penile changes  indeed replacement suprapubic catheter will be indicated.  However this can be done in the outpatient setting, Dr. Eisenberg relates his clinic will work on scheduling.  No indication for inpatient evaluation as such.     DISPOSITION:  Patient will be discharged to [facility].     FOLLOW UP:  Josemanuel Pizarro M.D.  [address]  [place] [id number]  [phone]    In 2 days      OUTPATIENT MEDICATIONS:  Discharge Medication List as of 10/7/2019  1:26 PM        FINAL IMPRESSION  1. Suprapubic catheter dysfunction, initial encounter ([facility])    2. Hypospadias in male         Beverly MORENO (Scribe), am scribing for, and in the presence of, Gaston Burnham MD.  Electronically signed by: Beverly Sanderson (Scrchristine), 10/7/2019  Gaston MORENO MD personally performed the services described in this documentation, as scribed by Beverly Sanderson in my presence, and it is both accurate and complete. E.     The note accurately reflects work and decisions made by me.  Gaston Burnham  10/7/2019  3:44 PM

## 2019-10-07 NOTE — ED NOTES
Patient discharged with [facility]. Attempted to josefina report to [facility]. No answer. VSS.

## 2019-10-07 NOTE — ED TRIAGE NOTES
Pt to Rm G25 .  Chief Complaint   Patient presents with   • Urinary Catheter Problem     pt sent from [facility] for further evaluation because pt pulled out suprapubic catherter this am

## 2019-10-07 NOTE — DISCHARGE INSTRUCTIONS
We spoke with the on-call urologist.  In the short-term, your Wasserman catheter will drain urine, they would like to follow-up in the office as soon as possible and will arrange with you for scheduling for replacement of your suprapubic catheter as soon as possible in the outpatient setting.

## 2019-10-08 NOTE — DISCHARGE PLANNING
:    NIURKA contacted by RN stating Pt can return to [facility].  SW contacted [facility] and they stated Pt was discharged 2 days ago.    NIURKA able to find Pt is living at [facility] [phone] [address]   SW spoke to Mary and she was updated Pt would be returning.    NIURKA spoke to Pt son Manny Miramontes [phone]  Pt is non ambulatory bed confined and is confused  SW will arranged for [facility]   He was updated on Pt being medically cleared and returning to the group [facility] via [facility].    PCS completed and faxed to [facility].  Transportation time arranged for 1330.    ED Encounter printed, DC packet completed and RN updated.  
To be discontinued when line removed

## 2019-10-09 ENCOUNTER — HOSPITAL ENCOUNTER (OUTPATIENT)
Facility: MEDICAL CENTER | Age: 84
End: 2019-10-09
Attending: UROLOGY | Admitting: UROLOGY
Payer: MEDICARE

## 2019-10-09 ENCOUNTER — APPOINTMENT (OUTPATIENT)
Dept: RADIOLOGY | Facility: MEDICAL CENTER | Age: 84
End: 2019-10-09
Attending: UROLOGY
Payer: MEDICARE

## 2019-10-09 VITALS
HEART RATE: 65 BPM | TEMPERATURE: 97.4 F | SYSTOLIC BLOOD PRESSURE: 105 MMHG | RESPIRATION RATE: 16 BRPM | DIASTOLIC BLOOD PRESSURE: 64 MMHG | WEIGHT: 171.3 LBS | BODY MASS INDEX: 22.7 KG/M2 | HEIGHT: 73 IN | OXYGEN SATURATION: 100 %

## 2019-10-09 DIAGNOSIS — R33.9 RETENTION OF URINE, UNSPECIFIED: ICD-10-CM

## 2019-10-09 LAB
INR PPP: 1.14 (ref 0.87–1.13)
PLATELET # BLD AUTO: 299 K/UL (ref 164–446)
PROTHROMBIN TIME: 14.9 SEC (ref 12–14.6)

## 2019-10-09 PROCEDURE — 700117 HCHG RX CONTRAST REV CODE 255: Performed by: RADIOLOGY

## 2019-10-09 PROCEDURE — 85049 AUTOMATED PLATELET COUNT: CPT

## 2019-10-09 PROCEDURE — 700105 HCHG RX REV CODE 258: Performed by: RADIOLOGY

## 2019-10-09 PROCEDURE — 85610 PROTHROMBIN TIME: CPT

## 2019-10-09 PROCEDURE — 160002 HCHG RECOVERY MINUTES (STAT)

## 2019-10-09 PROCEDURE — 700111 HCHG RX REV CODE 636 W/ 250 OVERRIDE (IP): Performed by: RADIOLOGY

## 2019-10-09 PROCEDURE — 700111 HCHG RX REV CODE 636 W/ 250 OVERRIDE (IP)

## 2019-10-09 PROCEDURE — 99153 MOD SED SAME PHYS/QHP EA: CPT

## 2019-10-09 RX ORDER — SODIUM CHLORIDE 9 MG/ML
1000 INJECTION, SOLUTION INTRAVENOUS
Status: DISCONTINUED | OUTPATIENT
Start: 2019-10-09 | End: 2019-10-09 | Stop reason: HOSPADM

## 2019-10-09 RX ORDER — NALOXONE HYDROCHLORIDE 0.4 MG/ML
INJECTION, SOLUTION INTRAMUSCULAR; INTRAVENOUS; SUBCUTANEOUS
Status: COMPLETED
Start: 2019-10-09 | End: 2019-10-09

## 2019-10-09 RX ORDER — MIDAZOLAM HYDROCHLORIDE 1 MG/ML
.5-2 INJECTION INTRAMUSCULAR; INTRAVENOUS PRN
Status: ACTIVE | OUTPATIENT
Start: 2019-10-09 | End: 2019-10-09

## 2019-10-09 RX ORDER — MIDAZOLAM HYDROCHLORIDE 1 MG/ML
INJECTION INTRAMUSCULAR; INTRAVENOUS
Status: COMPLETED
Start: 2019-10-09 | End: 2019-10-09

## 2019-10-09 RX ORDER — SODIUM CHLORIDE 9 MG/ML
500 INJECTION, SOLUTION INTRAVENOUS
Status: ACTIVE | OUTPATIENT
Start: 2019-10-09 | End: 2019-10-09

## 2019-10-09 RX ORDER — ONDANSETRON 2 MG/ML
4 INJECTION INTRAMUSCULAR; INTRAVENOUS PRN
Status: ACTIVE | OUTPATIENT
Start: 2019-10-09 | End: 2019-10-09

## 2019-10-09 RX ADMIN — SODIUM CHLORIDE 1000 ML: 9 INJECTION, SOLUTION INTRAVENOUS at 13:00

## 2019-10-09 RX ADMIN — IOHEXOL 10 ML: 300 INJECTION, SOLUTION INTRAVENOUS at 14:06

## 2019-10-09 RX ADMIN — FENTANYL CITRATE 25 MCG: 50 INJECTION, SOLUTION INTRAMUSCULAR; INTRAVENOUS at 13:42

## 2019-10-09 RX ADMIN — MIDAZOLAM HYDROCHLORIDE 1 MG: 1 INJECTION, SOLUTION INTRAMUSCULAR; INTRAVENOUS at 13:42

## 2019-10-09 RX ADMIN — MIDAZOLAM 1 MG: 1 INJECTION INTRAMUSCULAR; INTRAVENOUS at 14:05

## 2019-10-09 RX ADMIN — MIDAZOLAM 1 MG: 1 INJECTION INTRAMUSCULAR; INTRAVENOUS at 13:42

## 2019-10-09 NOTE — OR SURGEON
Immediate Post- Operative Note    PostOp Diagnosis: NEUROGENIC BLADDER OR OUTLET OBSTRUCTION. SP BLADDER CATHETER FELL OUT 2 DAYS AGO.       Procedure(s): U/S AND FLUOROSCOPIC GUIDED SUPRAPUBIC 14F BLADDER CATHETER PLACEMENT AND CYSTOGRAM      Estimated Blood Loss: <1CC        Complications: NONE            10/9/2019     2:11 PM     Ramy Pastor

## 2019-10-09 NOTE — PROGRESS NOTES
IR RN note  Patient underwent a Suprapubic catheter exchange by Dr. Pastor. Procedure site was marked by MD and verified using imaging guidance.  IR Tech Blanca and Damien assisted. Patient was placed in a supine position.  Vitals were taken every 5 minutes and remained stable during procedure (see doc flow sheet for results).  CO2 waveform capnography was monitored throughout procedure, see chart  A gauze and metapore tape dressing was placed over surgical site. Report called to Katelin POPE. Pt transported by shireen with RN to PPU, with monitor .    [employer] Flexima APDL Locking Pigtail 14Fr X25 cm  Ref#  [id number]  Lot# [id number]

## 2019-10-09 NOTE — OR NURSING
1429: Patient arrived from IR s/p suprapubic catheter exchange with RN. Suprapubic access site; clean, dry, and intact. Patient is arousable upon calling.    1450: Indwelling gutierrez catheter removed per active order from MD. Catheter intact, patient continues to rest comfortably. Suprapubic catheter draining, site is clean, dry, and intact.     1455: Son updated on POC via telephone.    1630: Period of bedrest completed. Criteria met to discharge patient.    1635: Discharge paperwork reviewed with patient and son. Discussed activity, site care, worsening symptoms, and follow-up. Verbalized understanding. No further questions. PIV removed, tip intact.    1640: Patient escorted out in wheelchair with CCT. Discharge instructions and personal belongings in possession of the patient. Copy of discharge instructions signed and placed in the chart. Patient discharged to home with son.

## 2019-10-09 NOTE — DISCHARGE INSTRUCTIONS
ACTIVITY: Rest and take it easy for the first 24 hours.  A responsible adult is recommended to remain with you during that time.  It is normal to feel sleepy.  We encourage you to not do anything that requires balance, judgment or coordination.    MILD FLU-LIKE SYMPTOMS ARE NORMAL. YOU MAY EXPERIENCE GENERALIZED MUSCLE ACHES, THROAT IRRITATION, HEADACHE AND/OR SOME NAUSEA.    FOR 24 HOURS DO NOT:  Drive, operate machinery or run household appliances.  Drink beer or alcoholic beverages.   Make important decisions or sign legal documents.    SPECIAL INSTRUCTIONS: Follow up with your referring Physician.  Resume home medications.    DIET: To avoid nausea, slowly advance diet as tolerated, avoiding spicy or greasy foods for the first day.  Add more substantial food to your diet according to your physician's instructions.  Babies can be fed formula or breast milk as soon as they are hungry.  INCREASE FLUIDS AND FIBER TO AVOID CONSTIPATION.    SURGICAL DRESSING/BATHING: Keep dressing dry for 72 hours. May remove dressing on 10/12, do not need to replace dressing. Do not submerge in water or bathe unless cleared by a doctor.    FOLLOW-UP APPOINTMENT:  A follow-up appointment should be arranged with your doctor; call to schedule.    You should CALL YOUR PHYSICIAN if you develop:  Fever greater than 101 degrees F.  Pain not relieved by medication, or persistent nausea or vomiting.  Excessive bleeding (blood soaking through dressing) or unexpected drainage from the wound.  Extreme redness or swelling around the incision site, drainage of pus or foul smelling drainage.  Inability to urinate or empty your bladder within 8 hours.  Problems with breathing or chest pain.    You should call 911 if you develop problems with breathing or chest pain.  If you are unable to contact your doctor or surgical center, you should go to the nearest emergency room or urgent care center.  Physician's telephone #: [phone]    If any  questions arise, call your doctor.  If your doctor is not available, please feel free to call the Surgical Center at [phone].  The Center is open Monday through Friday from 7AM to 7PM.  You can also call the HEALTH HOTLINE open 24 hours/day, 7 days/week and speak to a nurse at [phone], or toll free at [phone].    A registered nurse may call you a few days after your surgery to see how you are doing after your procedure.    MEDICATIONS: Resume taking daily medication.  Take prescribed pain medication with food.  If no medication is prescribed, you may take non-aspirin pain medication if needed.  PAIN MEDICATION CAN BE VERY CONSTIPATING.  Take a stool softener or laxative such as senokot, pericolace, or milk of magnesia if needed.    If your physician has prescribed pain medication that includes Acetaminophen (Tylenol), do not take additional Acetaminophen (Tylenol) while taking the prescribed medication.    Suprapubic Catheter Home Guide  A suprapubic catheter is a rubber tube used to drain urine from the bladder into a collection bag. The catheter is inserted into the bladder through a small opening in the in the lower abdomen, near the center of the body, above the pubic bone (suprapubic area). There is a tiny balloon filled with germ-free (sterile) water on the end of the catheter that is in the bladder. The balloon helps to keep the catheter in place.  Your suprapubic catheter may need to be replaced every 4-6 weeks, or as often as recommended by your health care provider. The collection bag must be emptied every day and cleaned every 2-3 days. The collection bag can be put beside your bed at night and attached to your leg during the day. You may have a large collection bag to use at night and a smaller one to use during the day.  What are the risks?  · Urine flow can become blocked. This can happen if the catheter is not working correctly, or if you have a blood clot in your bladder or in the  catheter.  · Tissue near the catheter may can become irritated and bleed.  · Bacteria may get into your bladder and cause a urinary tract infection.  How do I change the catheter?  Supplies needed  · Two pairs of sterile gloves.  · Catheter.  · Two syringes.  · Sterile water.  · Sterile cleaning solution.  · Lubricant.  · Collection bags.  Changing the catheter   To replace your catheter, take the following steps:  1. Drink plenty of fluids during the hours before you plan to change the catheter.  2. Wash your hands with soap and water. If soap and water are not available, use hand .  3. Lie on your back and put on sterile gloves.  4. Clean the skin around the catheter opening using the sterile cleaning solution.  5. Remove the water from the balloon using a syringe.  6. Slowly remove the catheter.  ¨ Do not pull on the catheter if it seems stuck.  ¨ Call your health care provider immediately if you have difficulty removing the catheter.  7. Take off the used gloves, and put on a new pair.  8. Put lubricant on the end of the new catheter that will go into your bladder.  9. Gently slide the catheter through the opening in your abdomen and into your bladder.  10. Wait for some urine to start flowing through the catheter. When urine starts to flow through the catheter, use a new syringe to fill the balloon with sterile water.  11. Attach the collection bag to the end of the catheter. Make sure the connection is tight.  12. Remove the gloves and wash your hands with soap and water.  How do I care for my skin around the catheter?  Use a clean washcloth and soapy water to clean the skin around your catheter every day. Pat the area dry with a clean towel.  · Do not pull on the catheter.  · Do not use ointment or lotion on this area unless told by your health care provider.  · Check your skin around the catheter every day for signs of infection. Check for:  ¨ Redness, swelling, or pain.  ¨ Fluid or  blood.  ¨ Warmth.  ¨ Pus or a bad smell.  How do I clean and empty the collection bag?  Clean the collection bag every 2-3 days, or as often as told by your health care provider. To do this, take the following steps:  · Wash your hands with soap and water. If soap and water are not available, use hand .  · Disconnect the bag from the catheter and immediately attach a new bag to the catheter.  · Empty the used bag completely.  · Clean the used bag using one of the following methods:  ¨ Rinse the used bag with warm water and soap.  ¨ Fill the bag with water and add 1 tsp of vinegar. Let it sit for about 30 minutes, then empty the bag.  · Let the bag dry completely, and put it in a clean plastic bag before storing it.  Empty the large collection bag every 8 hours. Empty the small collection bag when it is about ? full. To empty your large or small collection bag, take the following steps:  · Always keep the bag below the level of the catheter. This keeps urine from flowing backwards into the catheter.  · Hold the bag over the toilet or another container. Turn the valve (spigot) at the bottom of the bag to empty the urine.  ¨ Do not touch the opening of the spigot.  ¨ Do not let the opening touch the toilet or container.  · Close the spigot tightly when the bag is empty.  What are some general tips?  · Always wash your hands before and after caring for your catheter and collection bag. Use a mild, fragrance-free soap. If soap and water are not available, use hand .  · Clean the catheter with soap and water as often as told by your health care provider.  · Always make sure there are no twists or curls (kinks) in the catheter tube.  · Always make sure there are no leaks in the catheter or collection bag.  · Drink enough fluid to keep your urine clear or pale yellow.  · Do not take baths, swim, or use a hot tub.  When should I seek medical care?  Seek medical care if:  · You leak urine.  · You have  redness, swelling, or pain around your catheter opening.  · You have fluid or blood coming from your catheter opening.  · Your catheter opening feels warm to the touch.  · You have pus or a bad smell coming from your catheter opening.  · You have a fever or chills.  · Your urine flow slows down.  · Your urine becomes cloudy or smelly.  When should I seek immediate medical care?  Seek immediate medical care if your catheter comes out, or if you have:  · Nausea.  · Back pain.  · Difficulty changing your catheter.  · Blood in your urine.  · No urine flow for 1 hour.  This information is not intended to replace advice given to you by your health care provider. Make sure you discuss any questions you have with your health care provider.    Depression / Suicide Risk    As you are discharged from this [facility] facility, it is important to learn how to keep safe from harming yourself.    Recognize the warning signs:  · Abrupt changes in personality, positive or negative- including increase in energy   · Giving away possessions  · Change in eating patterns- significant weight changes-  positive or negative  · Change in sleeping patterns- unable to sleep or sleeping all the time   · Unwillingness or inability to communicate  · Depression  · Unusual sadness, discouragement and loneliness  · Talk of wanting to die  · Neglect of personal appearance   · Rebelliousness- reckless behavior  · Withdrawal from people/activities they love  · Confusion- inability to concentrate     If you or a loved one observes any of these behaviors or has concerns about self-harm, here's what you can do:  · Talk about it- your feelings and reasons for harming yourself  · Remove any means that you might use to hurt yourself (examples: pills, rope, extension cords, firearm)  · Get professional help from the community (Mental Health, Substance Abuse, psychological counseling)  · Do not be alone:Call your Safe Contact- someone whom you trust who  will be there for you.  · Call your local CRISIS HOTLINE 770-1854 or 664-251-8428  · Call your local Children's Mobile Crisis Response Team [place] (651) 706-4733 or www.Network Vision  · Call the toll free National Suicide Prevention Hotlines   · National Suicide Prevention Lifeline 619-822-NMEO (4455)  · National [employer] Line Network 800-SUICIDE (221-4516)

## 2022-02-21 NOTE — CARE PLAN
"[facility] Surgery Consultation    CC:  Colon Cancer screening     HPI:  This 36 year old year old male is seen at the request of Diana Jackman for evaluation of colon cancer screening. He was noted to have a colonoscopy in 2018 as there was concern about him being potentially high risk for colon cancer. He did have an uncle that  of colon cancer in his 20s. He also has a brother with ulcerative colitis that may have had colon issue, \"that was cured with radiation\" it is unclear to me what this is. Apparently brother has some mental health issues that makes getting details more difficult. He denies any bleeding or pain with bowel movements. He denies any changes to his own stool habits.     Past Medical History:   Diagnosis Date     Arthritis 2018     Benign positional vertigo 2016    Started after my groin/back injury. Sitting on Toilet.     Depressive disorder     I am on 120mg of Duoluxetine.     Dysphonia     Nothing significant.     Gastroesophageal reflux disease     Occassional. Spicy foods set it off.     Hearing problem     Theorized Diag: Essential Palatal Myoclonus     History of electroencephalogram 4/10/2019    EEG     Procedure Date: 2019    EEG #:  .      DATE OF EE2019.    SOURCE FILE DURATION:  15 minutes.  This EEG did not have a video.    PATIENT INFORMATION:  The patient is a 33-year-old male with irregular movements.  It is not entirely clear the etiology of these movements.  EEG is being done to evaluate for seizures.    MEDICATIONS:  Adderall, doxepin, Cymbalta, Robaxin.      History of MRI of brain and brain stem 2015    MR BRAIN W/O & W CONTRAST, 2015  Impression: No suspicious intracranial findings.      Hoarseness 2017    Dry mouth, started after taking Tizanidine     Neuralgia, neuritis, and radiculitis, unspecified 2012     normal emg 2017 2017    Interpretation: This is a normal study. There is no " Problem: Safety  Goal: Will remain free from falls  Intervention: Implement fall precautions  Patient educated on safety precautions, the utilization of the call light, and bed alarm. Patient verbalized understanding.       Problem: Infection  Goal: Will remain free from infection  Intervention: Implement standard precautions and perform hand washing before and after patient contact  RN will follow protocols and necessary steps to minimize the spread of infection. RN educated pt and and any visitors on proper hand hygiene.            electrophysiologic evidence of a lumbosacral radiculopathy affecting the right or left lower extremity on the basis of this study.    Rodney Mena MD Department of Neurology       Panic attack 12/6/2016     Seizures (H) 1986    Grew out of it. Absence Seizures. [id number]     Tinnitus 2015    Had it most of my life. Got worse in 2015       Past Surgical History:   Procedure Laterality Date     COLONOSCOPY  02/15/18    Has not happened yet.     COLONOSCOPY N/A 2/15/2018    Procedure: COMBINED COLONOSCOPY, SINGLE OR MULTIPLE BIOPSY/POLYPECTOMY BY BIOPSY;;  Surgeon: Liam Kincaid MD;  Location: [facility] OR     COLONOSCOPY WITH CO2 INSUFFLATION N/A 2/15/2018    Procedure: COLONOSCOPY WITH CO2 INSUFFLATION;  COLON-FAMILY HX OF COLON CANCER/ SYPURA;  Surgeon: Liam Kincaid MD;  Location: [facility] OR     HC TOOTH EXTRACTION W/FORCEP Bilateral 2003     PE TUBES  1990       Allergies   Allergen Reactions     Amitriptyline      Ineffective in reducing spasms/movement, increased fatigue  Other reaction(s): Other (see comments)     Buspirone Hcl Other (See Comments)     Panic attacks     Doxycycline Diarrhea, Fatigue, GI Disturbance and Nausea     Other reaction(s): GI intolerance     Cephalexin Diarrhea     Other reaction(s): GI intolerance       Current Outpatient Medications   Medication     ADDERALL XR 30 MG 24 hr capsule     albuterol (VENTOLIN HFA) 108 (90 Base) MCG/ACT inhaler     cyclobenzaprine (FLEXERIL) 5 MG tablet     furosemide (LASIX) 20 MG tablet     hydrOXYzine (ATARAX) 25 MG tablet     mirtazapine (REMERON) 15 MG tablet     OLANZapine zydis (ZYPREXA) 5 MG ODT     rivaroxaban ANTICOAGULANT (XARELTO ANTICOAGULANT) 20 MG TABS tablet     vilazodone (VIIBRYD) 10 MG TABS tablet     vilazodone (VIIBRYD) 20 MG TABS tablet     omeprazole (PRILOSEC) 20 MG DR capsule     No current facility-administered medications for this visit.       HABITS:    Social History     Tobacco Use     Smoking status: Current Every  Day Smoker     Packs/day: 0.50     Years: 10.00     Pack years: 5.00     Types: Cigarettes     Start date: 1/1/2002     Smokeless tobacco: Never Used     Tobacco comment: Transdermal patches have helped me the most in the past   Vaping Use     Vaping Use: Former     Substances: Nicotine, Flavoring     Devices: Pre-filled or refillable cartridge   Substance Use Topics     Alcohol use: No     Alcohol/week: 2.0 - 4.0 standard drinks     Comment: rarely     Drug use: Not Currently     Types: Methamphetamines     Comment: hx of meth, 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       Family History   Problem Relation Age of Onset     Lipids Father         hyperlipidemia     Hyperlipidemia Father      Obesity Father      Arthritis Mother      Hyperlipidemia Mother      Depression Mother      Anxiety Disorder Mother      Mental Illness Mother      Obesity Mother      Asthma Brother      Asthma Sister      Depression Other      Hearing Loss Other      Psychotic Disorder Other      Obesity Other      Cerebrovascular Disease Paternal Grandfather      Alzheimer Disease Paternal Grandfather      Depression Brother      Mental Illness Brother         Bipolar     Asthma Brother      Colitis Brother      Skin Cancer Brother      Depression Sister      Asthma Sister      Substance Abuse Sister         Alcohol     Mental Illness Brother         Bipolar     Asthma Brother      Colitis Brother      Asthma Sister      Obesity Sister      Asthma Brother      Obesity Brother      Obesity Maternal Grandmother      Genetic Disorder Maternal Grandmother         Epilepsy     Obesity Paternal Grandmother      Colon Cancer Other      Genetic Disorder Other         Cerebral Palsy     Genetic Disorder Maternal Grandfather         Multiple Sclerosis     Genetic Disorder Other         Epilepsy       REVIEW OF SYSTEMS:  Ten point review of systems negative except those mentioned in the HPI.     OBJECTIVE:    /60   Pulse 105   " Ht 1.702 m (5' 7\")   Wt 96.6 kg (213 lb)   SpO2 97%   BMI 33.36 kg/m      GENERAL: Generally appears well, in no distress with appropriate affect.  No physical exam performed.       IMPRESSION:    36 y.o. male who had undergone colonoscopy in the past 2018, after review of the note form the GI doctor the recommendation was genetic counseling as he does entirely meet criteria for high risk screening. He did have an uncle die of colon cancer in his 20s. He does have a brother with ulcerative colitis that may have had some sort of colon cancer but the history is unclear at this visit. He does not have any bleeding or change in stool habits. His colonoscopy in 2018 was only significant for a hyperplastic polyp. I discussed as opposed to having colonoscopies every five years he would likely be better off going through genetic counseling to see what his risk actually is. Discussed that after meeting with them would be happy to do scope but for long term it is likely better to be sure.     PLAN:    Genetic counseling   If deemed high risk will plan on colonoscopy.     Donnell Hopkins MD,     2/21/2022  9:23 AM      "

## 2022-04-22 NOTE — DISCHARGE INSTRUCTIONS
Discharge Instructions    Discharged to other by medical transportation with escort. Discharged via wheelchair, hospital escort: Yes.  Special equipment needed: Not Applicable    Be sure to schedule a follow-up appointment with your primary care doctor or any specialists as instructed.     Discharge Plan:   Influenza Vaccine Indication: Not indicated: Previously immunized this influenza season and > 8 years of age    I understand that a diet low in cholesterol, fat, and sodium is recommended for good health. Unless I have been given specific instructions below for another diet, I accept this instruction as my diet prescription.   Other diet:     Special Instructions: None    · Is patient discharged on Warfarin / Coumadin?   No     · Is patient Post Blood Transfusion?  No    Depression / Suicide Risk    As you are discharged from this [facility] facility, it is important to learn how to keep safe from harming yourself.    Recognize the warning signs:  · Abrupt changes in personality, positive or negative- including increase in energy   · Giving away possessions  · Change in eating patterns- significant weight changes-  positive or negative  · Change in sleeping patterns- unable to sleep or sleeping all the time   · Unwillingness or inability to communicate  · Depression  · Unusual sadness, discouragement and loneliness  · Talk of wanting to die  · Neglect of personal appearance   · Rebelliousness- reckless behavior  · Withdrawal from people/activities they love  · Confusion- inability to concentrate     If you or a loved one observes any of these behaviors or has concerns about self-harm, here's what you can do:  · Talk about it- your feelings and reasons for harming yourself  · Remove any means that you might use to hurt yourself (examples: pills, rope, extension cords, firearm)  · Get professional help from the community (Mental Health, Substance Abuse, psychological counseling)  · Do not be alone:Call your Safe  "Contact- someone whom you trust who will be there for you.  · Call your local CRISIS HOTLINE 283-4760 or 756-042-0282  · Call your local Children's Mobile Crisis Response Team [place] (881) 819-0274 or www.Mobilio  · Call the toll free National Suicide Prevention Hotlines   · National Suicide Prevention Lifeline 965-060-GYEN (8415)  · [employer]) Line Network 800-SUICIDE (294-3111)    Pacemaker Follow-up   A pacemaker follow-up is done to evaluate the pacemaker and its battery (pulse generator). Regular follow-up visits must be done to assess pacemaker function. These visits are determined by how old the pacemaker is or if there are signs of a low battery. Most pacemakers can also sense if your heart has had any abnormal heart beats (arrhythmias). Different kinds of tests are used to look at pacemaker function, check for abnormal heart beats, and assess battery life. If the battery energy is low, you may need a new battery.  BEFORE THE PROCEDURE   Your caregiver may do a physical exam to:  · Examine your neck veins for swelling (engorgement).   · Feel the skin over the pacemaker for swelling or tenderness.   · Check your skin over the pacemaker for signs of redness or wearing away.   · Make certain the pacemaker has not moved from its original position.   LET YOUR CAREGIVER KNOW ABOUT:   · Chest pain.   · Dizziness.   · Fainting (syncope).   · Skipped heart beats (palpitations).   · Fatigue or low energy.   · Any car accidents, falls, or injuries that have happened. It is very important for your caregiver to know about any injury that has occurred to your chest or back. Injuries near the pacemaker can affect how it functions.   · Any type of \"hiccuping\" or \"jumping\" near your upper stomach (diaphragm). If this happens, the pacemaker settings may need to be changed. This does not mean your pacemaker is malfunctioning.   PROCEDURE  There are different ways your pacemaker can be checked. These include: "   COMPREHENSIVE EVALUATION  To perform a comprehensive evaluation, your caregiver may use a computer called a . The  has a special wand that is placed over the pacemaker. The wand communicates with the pacemaker through a radio wave signal. The signal receives information from the pulse generator. By this method, your caregiver can assess pacemaker sensing, function, and battery life. Your caregiver can also make changes to the pacemaker settings. A comprehensive evaluation helps your caregiver fine-tune your pacemaker.  PACEMAKER MAGNET ASSESSMENT  Your caregiver may perform a magnet test of your pacemaker. This test looks at pacemaker function and battery life. A special magnet is placed over your pacemaker. The magnet causes the pacemaker to function at a fixed rate. The magnet provides some basic testing that can be done at home or in your caregiver's office.   TRANS-TELEPHONIC MONITORING  Your caregiver can also monitor your pacemaker at home. This is done over the telephone using a trans-telephonic method. A special device called a trans-telephonic transmitter sends information about your pacemaker to your caregiver's office. The information received allows your caregiver to see if the pacemaker is sensing the heartbeats properly, getting a low battery, or pacing abnormally.   AFTER THE PROCEDURE  · You will get a copy of your follow-up visit. The information will have the model number, date, current function and remaining battery life.   · Keep your pacemaker card with you at all times. It is important to know:   · Implant date.   · Type (NBE code).   · Brand.   · .   · Programmed rate.   · Generator model.   · If the pacemaker has detected an abnormal heart beat, more testing may be needed.   Document Released: 09/26/2009 Document Revised: 03/11/2013 Document Reviewed: 09/26/2009  [employer]® Patient Information ©2013 [employer].    Warfarin Coagulopathy  Warfarin (Coumadin®)  coagulopathy refers to bleeding that may occur as a complication of the medicine warfarin. Warfarin is an oral blood thinner (anticoagulant). Warfarin is used for medical conditions where thinning of the blood is needed to prevent blood clots.   CAUSES  Bleeding is the most common and most serious complication of warfarin. The amount of bleeding is related to the warfarin dose and length of treatment. In addition, bleeding complications can also occur due to:  · Intentional or accidental warfarin overdose.  · Underlying medical conditions.  · Dietary changes.  · Medicine, herbal, supplement, or alcohol interactions.  SYMPTOMS  Severe bleeding while on warfarin may occur from any tissue or organ. Symptoms of the blood being too thin may include:  · Bleeding from the nose or gums.  · Blood in bowel movements which may appear as bright red, dark, or black tarry stools.  · Blood in the urine which may appear as pink, red, or brown urine.  · Unusual bruising or bruising easily.  · A cut that does not stop bleeding within 10 minutes.  · Vomiting blood or continuous nausea for more than 1 day.  · Coughing up blood.  · Broken blood vessels in your eye (subconjunctival hemorrhage).  · Abdominal or back pain with or without flank bruising.  · Sudden, severe headache.  · Sudden weakness or numbness of the face, arm, or leg, especially on one side of the body.  · Sudden confusion.  · Trouble speaking (aphasia) or understanding.  · Sudden trouble seeing in one or both eyes.  · Sudden trouble walking.  · Dizziness.  · Loss of balance or coordination.  · Vaginal bleeding.  · Swelling or pain at an injection site.  · Superficial fat tissue death (necrosis) which may cause skin scarring. This is more common in women and may first present as pain in the waist, thighs, or buttocks.  HOME CARE INSTRUCTIONS  · Always contact your health care provider of any concerns or signs of possible warfarin coagulopathy as soon as possible.  · Take  warfarin exactly as directed by your health care provider. It is recommended that you take your warfarin dose at the same time of the day. If you have been told to stop taking warfarin, do not resume taking warfarin until directed to do so by your health care provider. Follow your health care provider's instructions if you accidentally take an extra dose or miss a dose of warfarin. It is very important to take warfarin as directed since bleeding or blood clots could result in chronic or permanent injury, pain, or disability.  · Keep all follow-up appointments with your health care provider as directed. It is very important to keep your appointments. Not keeping appointments could result in a chronic or permanent injury, pain, or disability because warfarin is a medicine that requires close monitoring.  · While taking warfarin, you will need to have regular blood tests to measure your blood clotting time. These blood tests usually include both the prothrombin time (PT) and International Normalized Ratio (INR) tests. The PT and INR results allow your health care provider to adjust your dose of warfarin. The dose can change for many reasons. It is critically important that you have your PT and INR levels drawn exactly as directed. Your warfarin dose may stay the same or change depending on what the PT and INR results are. Be sure to follow up with your health care provider regarding your PT and INR test results and what your warfarin dosage should be.  · Many medicines can interfere with warfarin and affect the PT and INR results. You must tell your health care provider about any and all medicines you take. This includes all vitamins and supplements. Ask your health care provider before taking these. Prescription and over-the-counter medicine consistency is critical to warfarin management. It is important that potential interactions are checked before you start a new medicine. Be especially cautious with aspirin and  anti-inflammatory medicines. Ask your health care provider before taking these. Medicines such as antibiotics and acid-reducing medicine can interact with warfarin and can cause an increased warfarin effect. Warfarin can also interfere with the effectiveness of medicines you are taking. Do not take or discontinue any prescribed or over-the-counter medicine except on the advice of your health care provider or pharmacist.  · Some vitamins, supplements, and herbal products interfere with the effectiveness of warfarin. Vitamin E may increase the anticoagulant effects of warfarin. Vitamin K can cause warfarin to be less effective. Do not take or discontinue any vitamin, supplement, or herbal product except on the advice of your health care provider or pharmacist.  · Eat what you normally eat and keep the vitamin K content of your diet consistent. Avoid major changes in your diet, or notify your health care provider before changing your diet. Suddenly getting a lot more vitamin K could cause your blood to clot too quickly. A sudden decrease in vitamin K intake could cause your blood to clot too slowly. These changes in vitamin K intake could lead to dangerous blood clots or to bleeding. To keep your vitamin K intake consistent, you must be aware of which foods contain moderate or high amounts of vitamin K. Some foods that are high in vitamin K include spinach, kale, broccoli, cabbage, greens, [place] sprouts, asparagus, bok dulce, coleslaw, and parsley. If you drink green tea, drink the same amount each day. Arrange a visit with a dietitian to answer your questions.  · If you have a loss of appetite or get the stomach flu (viral gastroenteritis), talk to your health care provider as soon as possible. A decrease in your normal vitamin K intake can make you more sensitive to your usual dose of warfarin.  · Some medical conditions may increase your risk for bleeding while you are taking warfarin. A fever, diarrhea lasting  more than a day, worsening heart failure, or worsening liver function are some medical conditions that could affect warfarin. Contact your health care provider if you have any of these medical conditions.  · Be careful not to cut yourself when using sharp objects or while shaving.  · Alcohol can change the body's ability to handle warfarin. It is best to avoid alcoholic drinks or consume only very small amounts while taking warfarin. Notify your health care provider if you change your alcohol intake. A sudden increase in alcohol use can increase your risk of bleeding. Chronic alcohol use can cause warfarin to be less effective.  · Limit physical activities or sports that could result in a fall or cause injury.  · Do not use warfarin if you are pregnant.  · Inform all your health care providers and your dentist that you take warfarin.  · Inform all health care providers if you are taking warfarin and aspirin or platelet inhibitor medicines such as clopidogrel, ticagrelor, or prasugrel. Use of these medicines in addition to warfarin can increase your risk of bleeding or death. Taking these medicines together should only be done under the direct care of your health care providers.  SEEK IMMEDIATE MEDICAL CARE IF:  · You cough up blood.  · You have dark or black stools or there is bright red blood coming from your rectum.  · You vomit blood or have nausea for more than 1 day.  · You have blood in the urine or pink-colored urine.  · You have unusual bruising or have increased bruising.  · You have bleeding from the nose or gums that does not stop quickly.  · You have a cut that does not stop bleeding within 2-3 minutes.  · You have sudden weakness or numbness of the face, arm, or leg, especially on one side of the body.  · You have sudden confusion.  · You have trouble speaking (aphasia) or understanding.  · You have sudden trouble seeing in one or both eyes.  · You have sudden trouble walking.  · You have  "dizziness.  · You have a loss of balance or coordination.  · You have a sudden, severe headache.  · You have a serious fall or head injury, even if you are not bleeding.  · You have swelling or pain at an injection site.  · You have unexplained tenderness or pain in the abdomen, back, waist, thighs, or buttocks.  Any of these symptoms may represent a serious problem that is an emergency. Do not wait to see if the symptoms will go away. Get medical help right away. Call your local emergency services (911 in [place] Do not drive yourself to the hospital.     This information is not intended to replace advice given to you by your health care provider. Make sure you discuss any questions you have with your health care provider.     Document Released: 11/26/2007 Document Revised: 05/03/2016 Document Reviewed: 05/28/2013  [employer] Patient Education ©2016 [employer]    Warfarin: What You Need to Know  Warfarin is an anticoagulant. Anticoagulants help prevent the formation of blood clots. They also help stop the growth of blood clots. Warfarin is sometimes referred to as a \"blood thinner.\"   Normally, when body tissues are cut or damaged, the blood clots in order to prevent blood loss. Sometimes clots form inside your blood vessels and obstruct the flow of blood through your circulatory system (thrombosis). These clots may travel through your bloodstream and become lodged in smaller blood vessels in your brain, which can cause a stroke, or in your lungs (pulmonary embolism).  WHO SHOULD USE WARFARIN?  Warfarin is prescribed for people at risk of developing harmful blood clots:  · People with surgically implanted mechanical heart valves, irregular heart rhythms called atrial fibrillation, and certain clotting disorders.  · People who have developed harmful blood clotting in the past, including those who have had a stroke or a pulmonary embolism, or thrombosis in their legs (deep vein thrombosis " "[DVT]).  · People with an existing blood clot, such as a pulmonary embolism.  WARFARIN DOSING  Warfarin tablets come in different strengths. Each tablet strength is a different color, with the amount of warfarin (in milligrams) clearly printed on the tablet. If the color of your tablet is different than usual when you receive a new prescription, report it immediately to your pharmacist or health care provider.  WARFARIN MONITORING  The goal of warfarin therapy is to lessen the clotting tendency of blood but not prevent clotting completely. Your health care provider will monitor the anticoagulation effect of warfarin closely and adjust your dose as needed. For your safety, blood tests called prothrombin time (PT) or international normalized ratio (INR) are used to measure the effects of warfarin. Both of these tests can be done with a finger stick or a blood draw.  The longer it takes the blood to clot, the higher the PT or INR. Your health care provider will inform you of your \"target\" PT or INR range. If, at any time, your PT or INR is above the target range, there is a risk of bleeding. If your PT or INR is below the target range, there is a risk of clotting.  Whether you are started on warfarin while you are in the hospital or in your health care provider's office, you will need to have your PT or INR checked within one week of starting the medicine. Initially, some people are asked to have their PT or INR checked as much as twice a week.  Once you are on a stable maintenance dose, the PT or INR is checked less often, usually once every 2 to 4 weeks. The warfarin dose may be adjusted if the PT or INR is not within the target range. It is important to keep all laboratory and health care provider follow-up appointments. Not keeping appointments could result in a chronic or permanent injury, pain, or disability because warfarin is a medicine that requires close monitoring.  WHAT ARE THE SIDE EFFECTS OF " WARFARIN?  · Too much warfarin can cause bleeding (hemorrhage) from any part of the body. This may include bleeding from the gums, blood in the urine, bloody or dark stools, a nosebleed that is not easily stopped, coughing up blood, or vomiting blood.  · Too little warfarin can increase the risk of blood clots.  · Too little or too much warfarin can also increase the risk of a stroke.  · Warfarin use may cause a skin rash or irritation, an unusual fever, continual nausea or stomach upset, or severe pain in your joints or back.  SPECIAL PRECAUTIONS WHILE TAKING WARFARIN  Warfarin should be taken exactly as directed. It is very important to take warfarin as directed since bleeding or blood clots could result in chronic or permanent injury, pain, or disability.  · Take your medicine at the same time every day. If you forget to take your dose, you can take it if it is within 6 hours of when it was due.  · Do not change the dose of warfarin on your own to make up for missed or extra doses.  · If you miss more than 2 doses in a row, you should contact your health care provider for advice.  Avoid situations that cause bleeding. You may have a tendency to bleed more easily than usual while taking warfarin. The following actions can limit bleeding:  · Using a softer toothbrush.  · Flossing with waxed floss rather than unwaxed floss.  · Shaving with an electric razor rather than a blade.  · Limiting the use of sharp objects.  · Avoiding potentially harmful activities, such as contact sports.  Warfarin and Pregnancy or Breastfeeding  · Warfarin is not advised during the first trimester of pregnancy due to an increased risk of birth defects. In certain situations, a woman may take warfarin after her first trimester of pregnancy. A woman who becomes pregnant or plans to become pregnant while taking warfarin should notify her health care provider immediately.  · Although warfarin does not pass into breast milk, a woman who wishes  to breastfeed while taking warfarin should also consult with her health care provider.  Alcohol, Smoking, and Illicit Drug Use  · Alcohol affects how warfarin works in the body. It is best to avoid alcoholic drinks or consume very small amounts while taking warfarin. In general, alcohol intake should be limited to 1 oz (30 mL) of liquor, 6 oz (180 mL) of wine, or 12 oz (360 mL) of beer each day. Notify your health care provider if you change your alcohol intake.  · Smoking affects how warfarin works. It is best to avoid smoking while taking warfarin. Notify your health care provider if you change your smoking habits.  · It is best to avoid all illicit drugs while taking warfarin since there are few studies that show how warfarin interacts with these drugs.  Other Medicines and Dietary Supplements  Many prescription and over-the-counter medicines can interfere with warfarin. Be sure all of your health care providers know you are taking warfarin. Notify your health care provider who prescribed warfarin for you or your pharmacist before starting or stopping any new medicines, including over-the-counter vitamins, dietary supplements, and pain medicines. Your warfarin dose may need to be adjusted.  Some common over-the-counter medicines that may increase the risk of bleeding while taking warfarin include:   · Acetaminophen.  · Aspirin.  · Nonsteroidal anti-inflammatory medicines (NSAIDs), such as ibuprofen or naproxen.  · Vitamin E.  Dietary Considerations   Foods that have moderate or high amounts of vitamin K can interfere with warfarin. Avoid major changes in your diet or notify your health care provider before changing your diet. Eat a consistent amount of foods that have moderate or high amounts of vitamin K. Eating less foods containing vitamin K can increase the risk of bleeding. Eating more foods containing vitamin K can increase the risk of blood clots. Additional questions about dietary considerations can be  "discussed with a dietitian.  Foods that are very high in vitamin K:  · Greens, such as Swiss chard and beet, maximiliano, mustard, or turnip greens (fresh or frozen, cooked).  · Kale (fresh or frozen, cooked).  · Parsley (raw).  · Spinach (cooked).  Foods that are high in vitamin K:  · Asparagus (frozen, cooked).  · Broccoli.  · Bok dulce (cooked).  · [place] sprouts (fresh or frozen, cooked).  · Cabbage (cooked).  ·  Coleslaw.  Foods that are moderately high in vitamin K:  · Blueberries.  · Black-eyed peas.  · Endive (raw).  · Green leaf lettuce (raw).  · Green scallions (raw).  · Kale (raw).  · Okra (frozen, cooked).  · Plantains (fried).  · Rell lettuce (raw).  · Sauerkraut (canned).  · Spinach (raw).  CALL YOUR CLINIC OR HEALTH CARE PROVIDER IF YOU:  · Plan to have any surgery or procedure.  · Feel sick, especially if you have diarrhea or vomiting.  · Experience or anticipate any major changes in your diet.  · Start or stop a prescription or over-the-counter medicine.  · Become, plan to become, or think you may be pregnant.  · Are having heavier than usual menstrual periods.  · Have had a fall, accident, or any symptoms of bleeding or unusual bruising.  · Develop an unusual fever.  CALL 911 IN THE [place] OR GO TO THE EMERGENCY DEPARTMENT IF YOU:   · Think you may be having an allergic reaction to warfarin. The signs of an allergic reaction could include itching, rash, hives, swelling, chest tightness, or trouble breathing.  · See signs of blood in your urine. The signs could include reddish, pinkish, or tea-colored urine.  · See signs of blood in your stools. The signs could include bright red or black stools.  · Vomit or cough up blood. In these instances, the blood could have either a bright red or a \"coffee-grounds\" appearance.  · Have bleeding that will not stop after applying pressure for 30 minutes such as cuts, nosebleeds, or other injuries.  · Have severe pain in your joints or back.  · Have a new and " severe headache.  · Have sudden weakness or numbness of your face, arm, or leg, especially on one side of your body.  · Have sudden confusion or trouble understanding.  · Have sudden trouble seeing in one or both eyes.  · Have sudden trouble walking, dizziness, loss of balance, or coordination.  · Have trouble speaking or understanding (aphasia).     This information is not intended to replace advice given to you by your health care provider. Make sure you discuss any questions you have with your health care provider.     Document Released: 12/18/2006 Document Revised: 01/08/2016 Document Reviewed: 06/13/2014  [employer] Interactive Patient Education ©2016 [employer]       Present (10 x10 bpm)